# Patient Record
Sex: FEMALE | Race: WHITE | NOT HISPANIC OR LATINO | Employment: FULL TIME | URBAN - NONMETROPOLITAN AREA
[De-identification: names, ages, dates, MRNs, and addresses within clinical notes are randomized per-mention and may not be internally consistent; named-entity substitution may affect disease eponyms.]

---

## 2017-01-05 ENCOUNTER — OFFICE VISIT (OUTPATIENT)
Dept: INTERNAL MEDICINE | Facility: CLINIC | Age: 42
End: 2017-01-05

## 2017-01-05 VITALS
RESPIRATION RATE: 14 BRPM | TEMPERATURE: 98.2 F | HEART RATE: 90 BPM | SYSTOLIC BLOOD PRESSURE: 114 MMHG | OXYGEN SATURATION: 98 % | HEIGHT: 63 IN | BODY MASS INDEX: 24.66 KG/M2 | DIASTOLIC BLOOD PRESSURE: 72 MMHG | WEIGHT: 139.19 LBS

## 2017-01-05 DIAGNOSIS — J01.00 ACUTE NON-RECURRENT MAXILLARY SINUSITIS: Primary | ICD-10-CM

## 2017-01-05 PROCEDURE — 99213 OFFICE O/P EST LOW 20 MIN: CPT | Performed by: PHYSICIAN ASSISTANT

## 2017-01-05 RX ORDER — AMOXICILLIN 875 MG/1
875 TABLET, COATED ORAL 2 TIMES DAILY
Qty: 14 TABLET | Refills: 0 | Status: SHIPPED | OUTPATIENT
Start: 2017-01-05 | End: 2017-04-03

## 2017-01-05 NOTE — MR AVS SNAPSHOT
Umberto Oliva   1/5/2017 4:00 PM   Office Visit    Provider:  ARIELLE Wagner   Department:  [facility] PRIMARY CARE   Dept Phone:  [phone]                Your Full Care Plan              Today's Medication Changes          These changes are accurate as of: 1/5/17  3:57 PM.  If you have any questions, ask your nurse or doctor.               New Medication(s)Ordered:     amoxicillin 875 MG tablet   Commonly known as:  AMOXIL   Take 1 tablet by mouth 2 (Two) Times a Day.            Where to Get Your Medications      These medications were sent to [id number] - [place], [place] - [address] - [phone]  - [phone] FX  [address] [place] [address]     Phone:  [phone]     amoxicillin 875 MG tablet                  Your Updated Medication List          This list is accurate as of: 1/5/17  3:57 PM.  Always use your most recent med list.                amoxicillin 875 MG tablet   Commonly known as:  AMOXIL   Take 1 tablet by mouth 2 (Two) Times a Day.       estradiol 0.5 MG tablet   Commonly known as:  ESTRACE       ibuprofen 800 MG tablet   Commonly known as:  ADVIL,MOTRIN   Take 1 tablet by mouth Every 8 (Eight) Hours As Needed for mild pain (1-3) or headaches.       MULTIVITAL tablet       SUMAtriptan 100 MG tablet   Commonly known as:  IMITREX   Take 1 tablet by mouth Every 2 (Two) Hours As Needed for migraine.               You Were Diagnosed With        Codes Comments    Acute non-recurrent maxillary sinusitis    -  Primary ICD-10-CM: J01.00  ICD-9-CM: 461.0       Instructions     None    Patient Instructions History      MyChart Signup     Our records indicate that you have declined [facility] [employer] signup. If you would like to sign up for [employer], please email [email or web address] or call [phone] to obtain an activation code.             Other Info from Your Visit           Your Appointments     Velasquez  "05, 2017  4:00 PM EST   Same Day with ARIELLE Wagner   [facility] PRIMARY [facility]ARE (--)    [address]  [facility] [address] [phone]              Allergies     No Known Allergies      Reason for Visit     Sinus Problem Taking Sudafed and Mucinex. For over a week.       Vital Signs     Blood Pressure Pulse Temperature Respirations Height Weight    114/72 90 98.2 °F (36.8 °C) 14 63\" (160 cm) 139 lb 3 oz (63.1 kg)    Oxygen Saturation Body Mass Index Smoking Status             98% 24.66 kg/m2 Never Smoker         Problems and Diagnoses Noted     Acute non-recurrent maxillary sinusitis    -  Primary      "

## 2017-01-05 NOTE — PROGRESS NOTES
Umberto Oliva is a 41 y.o. female.     Subjective   History of Present Illness   Here today with complaint of over 1 week of postnasal drip, nasal congestion, sore throat, ear fullness, head pressure and cough. Has had some cold sweats but no fever. Taking Mucinex and Sudafed which helped the first 2 days but has not been helping this week.       The following portions of the patient's history were reviewed and updated as appropriate: allergies, current medications, past family history, past medical history, past social history, past surgical history and problem list.    Review of Systems    Constitutional: Negative for appetite change, chills, fatigue, fever and unexpected weight change.   HENT: congestion, ear pain, postnasal drip, rhinorrhea, sore throat.  Negative for hearing loss, nosebleeds, tinnitus and trouble swallowing.    Eyes: Negative for photophobia, discharge and visual disturbance.   Respiratory: Cough. Negative for chest tightness, shortness of breath and wheezing.    Cardiovascular: Negative for chest pain, palpitations and leg swelling.   Gastrointestinal: Negative for abdominal distention, abdominal pain, blood in stool, constipation, diarrhea, nausea and vomiting.   Endocrine: Negative for cold intolerance, heat intolerance, polydipsia, polyphagia and polyuria.   Musculoskeletal: Negative for arthralgias, back pain, joint swelling, myalgias, neck pain and neck stiffness.   Skin: Negative for color change, pallor, rash and wound.   Allergic/Immunologic: Negative for environmental allergies, food allergies and immunocompromised state.   Neurological: HA. Negative for dizziness, tremors, seizures, weakness, numbness.  Hematological: Negative for adenopathy. Does not bruise/bleed easily.   Psychiatric/Behavioral: Negative for agitation, behavioral problems, confusion, hallucinations, self-injury and suicidal ideas. The patient is not nervous/anxious.      Objective    Physical Exam  Constitutional:  "Oriented to person, place, and time. Appears well-developed and well-nourished.   HENT: white PND visible, bilateral TM effusions.   Head: Maxillary sinus tenderness. Normocephalic and atraumatic.   Eyes: EOM are normal. Pupils are equal, round, and reactive to light.   Neck: Normal range of motion. Neck supple.   Cardiovascular: Normal rate, regular rhythm and normal heart sounds.    Pulmonary/Chest: Effort normal and breath sounds normal. No respiratory distress.  Has no wheezes or rales. Exhibits no chest wall tenderness.   Abdominal: Soft. Bowel sounds are normal. Exhibits no distension and no mass. There is no tenderness.   Musculoskeletal: Normal range of motion. Exhibits no tenderness.   Neurological: Alert and oriented to person, place, and time.   Skin: Skin is warm and dry.   Psychiatric: Has a normal mood and affect. Behavior is normal. Judgment and thought content normal.       Visit Vitals   • /72   • Pulse 90   • Temp 98.2 °F (36.8 °C)   • Resp 14   • Ht 63\" (160 cm)   • Wt 139 lb 3 oz (63.1 kg)   • SpO2 98%   • BMI 24.66 kg/m2       Nursing note and vitals reviewed.        Assessment/Plan   Umberto was seen today for sinus problem.    Diagnoses and all orders for this visit:    Acute non-recurrent maxillary sinusitis  -     amoxicillin (AMOXIL) 875 MG tablet; Take 1 tablet by mouth 2 (Two) Times a Day.  Continue Mucinex and Sudafed prn.            "

## 2017-02-01 ENCOUNTER — OFFICE VISIT (OUTPATIENT)
Dept: INTERNAL MEDICINE | Facility: CLINIC | Age: 42
End: 2017-02-01

## 2017-02-01 VITALS
HEART RATE: 88 BPM | OXYGEN SATURATION: 97 % | DIASTOLIC BLOOD PRESSURE: 80 MMHG | HEIGHT: 63 IN | TEMPERATURE: 98.8 F | SYSTOLIC BLOOD PRESSURE: 110 MMHG | BODY MASS INDEX: 24.91 KG/M2 | WEIGHT: 140.6 LBS

## 2017-02-01 DIAGNOSIS — R07.89 CHEST DISCOMFORT: ICD-10-CM

## 2017-02-01 DIAGNOSIS — H53.9 VISION CHANGES: ICD-10-CM

## 2017-02-01 DIAGNOSIS — R42 DIZZINESS: Primary | ICD-10-CM

## 2017-02-01 LAB
BASOPHILS # BLD AUTO: 0.05 10*3/MM3 (ref 0–0.2)
BASOPHILS NFR BLD AUTO: 0.7 % (ref 0–1)
DEPRECATED RDW RBC AUTO: 41.4 FL (ref 37–54)
EOSINOPHIL # BLD AUTO: 0.05 10*3/MM3 (ref 0.1–0.3)
EOSINOPHIL NFR BLD AUTO: 0.7 % (ref 0–3)
ERYTHROCYTE [DISTWIDTH] IN BLOOD BY AUTOMATED COUNT: 12.3 % (ref 11.3–14.5)
HCT VFR BLD AUTO: 36.2 % (ref 34.5–44)
HGB BLD-MCNC: 12 G/DL (ref 11.5–15.5)
IMM GRANULOCYTES # BLD: 0.02 10*3/MM3 (ref 0–0.03)
IMM GRANULOCYTES NFR BLD: 0.3 % (ref 0–0.6)
LYMPHOCYTES # BLD AUTO: 3.37 10*3/MM3 (ref 0.6–4.8)
LYMPHOCYTES NFR BLD AUTO: 43.8 % (ref 24–44)
MCH RBC QN AUTO: 30.1 PG (ref 27–31)
MCHC RBC AUTO-ENTMCNC: 33.1 G/DL (ref 32–36)
MCV RBC AUTO: 90.7 FL (ref 80–99)
MONOCYTES # BLD AUTO: 0.55 10*3/MM3 (ref 0–1)
MONOCYTES NFR BLD AUTO: 7.2 % (ref 0–12)
NEUTROPHILS # BLD AUTO: 3.65 10*3/MM3 (ref 1.5–8.3)
NEUTROPHILS NFR BLD AUTO: 47.3 % (ref 41–71)
PLATELET # BLD AUTO: 248 10*3/MM3 (ref 150–450)
PMV BLD AUTO: 9.1 FL (ref 6–12)
RBC # BLD AUTO: 3.99 10*6/MM3 (ref 3.89–5.14)
WBC NRBC COR # BLD: 7.69 10*3/MM3 (ref 3.5–10.8)

## 2017-02-01 PROCEDURE — 85025 COMPLETE CBC W/AUTO DIFF WBC: CPT | Performed by: FAMILY MEDICINE

## 2017-02-01 PROCEDURE — 93000 ELECTROCARDIOGRAM COMPLETE: CPT | Performed by: FAMILY MEDICINE

## 2017-02-01 PROCEDURE — 99214 OFFICE O/P EST MOD 30 MIN: CPT | Performed by: FAMILY MEDICINE

## 2017-02-01 NOTE — PROGRESS NOTES
"Subjective   Umberto Oliva is a 41 y.o. female.     History of Present Illness   Comes in due to ongoing symptoms. She has had spells where she sees spots. She's having \"dizzy spells\" where she can describe it as light headed. No vertigo. At work earlier it was like a fog coming over eyes. She also has headaches, no certain spots. She feels weak, it hit her quickly, then breaks out in a sweat. Not a hot flash, she's had them before. Cold sweat. Felt a pressure over head and chest last night. She has Imitrex for migraines, she took one yesterday morning around 5 as she did not feel well. She also hears a \"buzzing sound in my head, jason.\" not like ears ringing, more like when she's been dehydrated. Headaches and weak spells started last week. No known family history of neurological disorders.    The following portions of the patient's history were reviewed and updated as appropriate: allergies, current medications, past family history, past medical history, past social history, past surgical history and problem list.    Review of Systems   Constitutional: Negative for unexpected weight change.   Respiratory: Negative for shortness of breath.    Cardiovascular: Positive for chest pain.   Gastrointestinal: Positive for nausea (Sunday).       Objective   Physical Exam   Constitutional: She is oriented to person, place, and time. Vital signs are normal. She appears well-developed and well-nourished. She is active.  Non-toxic appearance.   HENT:   Head: Normocephalic and atraumatic.   Right Ear: Hearing, tympanic membrane, external ear and ear canal normal.   Left Ear: Hearing, tympanic membrane, external ear and ear canal normal.   Nose: Nose normal.   Mouth/Throat: Uvula is midline, oropharynx is clear and moist and mucous membranes are normal.   Eyes: Conjunctivae, EOM and lids are normal. Pupils are equal, round, and reactive to light.   Wearing glasses     Neck: Phonation normal. Neck supple. No thyromegaly present. "   Cardiovascular: Normal rate, regular rhythm and normal heart sounds.    No murmur heard.  Pulmonary/Chest: Effort normal and breath sounds normal.   Lymphadenopathy:     She has no cervical adenopathy.   Neurological: She is alert and oriented to person, place, and time. She has normal strength. She displays no atrophy and no tremor. No cranial nerve deficit or sensory deficit. She exhibits normal muscle tone. Gait normal.   Skin: Skin is warm and dry. She is not diaphoretic. No cyanosis. Nails show no clubbing.   Psychiatric: She has a normal mood and affect. Her speech is normal and behavior is normal. Judgment and thought content normal. Cognition and memory are normal.   Nursing note and vitals reviewed.         Office Visit on 12/27/2016   Component Date Value Ref Range Status   • Total Cholesterol 12/27/2016 178  0 - 200 mg/dL Final   • Triglycerides 12/27/2016 78  0 - 150 mg/dL Final   • HDL Cholesterol 12/27/2016 63* 40 - 60 mg/dL Final   • LDL Cholesterol  12/27/2016 90  0 - 130 mg/dL Final   • Glucose 12/27/2016 84  70 - 100 mg/dL Final   • BUN 12/27/2016 13  9 - 23 mg/dL Final   • Creatinine 12/27/2016 0.70  0.60 - 1.30 mg/dL Final   • Sodium 12/27/2016 144  132 - 146 mmol/L Final   • Potassium 12/27/2016 4.0  3.5 - 5.5 mmol/L Final   • Chloride 12/27/2016 106  99 - 109 mmol/L Final   • CO2 12/27/2016 29.0  20.0 - 31.0 mmol/L Final   • Calcium 12/27/2016 10.0  8.7 - 10.4 mg/dL Final   • Total Protein 12/27/2016 7.3  5.7 - 8.2 g/dL Final   • Albumin 12/27/2016 4.40  3.20 - 4.80 g/dL Final   • ALT (SGPT) 12/27/2016 15  7 - 40 U/L Final   • AST (SGOT) 12/27/2016 26  0 - 33 U/L Final   • Alkaline Phosphatase 12/27/2016 82  25 - 100 U/L Final   • Total Bilirubin 12/27/2016 0.4  0.3 - 1.2 mg/dL Final   • eGFR Non African Amer 12/27/2016 93  >60 mL/min/1.73 Final   • Globulin 12/27/2016 2.9  gm/dL Final   • A/G Ratio 12/27/2016 1.5  1.5 - 2.5 g/dL Final   • BUN/Creatinine Ratio 12/27/2016 18.6  7.0 - 25.0  Final   • Anion Gap 12/27/2016 9.0  3.0 - 11.0 mmol/L Final   • TSH 12/27/2016 0.846  0.350 - 5.350 mIU/mL Final   • RA Latex Turbid 12/27/2016 <10.0  0.0 - 13.9 IU/mL Final   • RUDDY Direct 12/27/2016 Negative  Negative Final       ECG 12 Lead  Date/Time: 2/1/2017 4:27 PM  Performed by: DINO NEGRON  Authorized by: DINO NEGRON   Comparison: not compared with previous ECG   Rhythm: sinus rhythm and sinus tachycardia  Rate: normal  BPM: 68  Conduction: conduction normal  ST Segments: ST segments normal  T Waves: T waves normal  T depression: V1  QRS axis: normal  Other findings comments: poor conduction V3. p wave inversion V1  Clinical impression: normal ECG and non-specific ECG              Assessment/Plan   Umberto was seen today for dizziness, weakness - generalized and chest pain.    Diagnoses and all orders for this visit:    Dizziness  -     MRI Brain With & Without Contrast; Future  -     CBC & Differential; Future    Vision changes  -     MRI Brain With & Without Contrast; Future    Chest discomfort    Other orders  -     ECG 12 Lead      Ddx includes migraine, migraine variants but need to rule out vascular malformation, mass, white matter changes.

## 2017-02-13 ENCOUNTER — HOSPITAL ENCOUNTER (OUTPATIENT)
Dept: MRI IMAGING | Facility: HOSPITAL | Age: 42
Discharge: HOME | End: 2017-02-13
Admitting: FAMILY MEDICINE

## 2017-02-13 DIAGNOSIS — R42 DIZZINESS: ICD-10-CM

## 2017-02-13 DIAGNOSIS — H53.9 VISION CHANGES: ICD-10-CM

## 2017-02-13 PROCEDURE — 0 GADOBENATE DIMEGLUMINE 529 MG/ML SOLUTION: Performed by: FAMILY MEDICINE

## 2017-02-13 PROCEDURE — A9577 INJ MULTIHANCE: HCPCS | Performed by: FAMILY MEDICINE

## 2017-02-13 PROCEDURE — 70553 MRI BRAIN STEM W/O & W/DYE: CPT

## 2017-02-13 RX ADMIN — GADOBENATE DIMEGLUMINE 12 ML: 529 INJECTION, SOLUTION INTRAVENOUS at 11:45

## 2017-04-03 ENCOUNTER — OFFICE VISIT (OUTPATIENT)
Dept: INTERNAL MEDICINE | Facility: CLINIC | Age: 42
End: 2017-04-03

## 2017-04-03 VITALS
BODY MASS INDEX: 25.16 KG/M2 | DIASTOLIC BLOOD PRESSURE: 80 MMHG | SYSTOLIC BLOOD PRESSURE: 110 MMHG | WEIGHT: 142 LBS | HEIGHT: 63 IN | HEART RATE: 90 BPM | OXYGEN SATURATION: 99 % | TEMPERATURE: 98.7 F

## 2017-04-03 DIAGNOSIS — R05.8 NON-PRODUCTIVE COUGH: ICD-10-CM

## 2017-04-03 DIAGNOSIS — Z20.828 EXPOSURE TO THE FLU: ICD-10-CM

## 2017-04-03 DIAGNOSIS — J02.9 SORE THROAT: Primary | ICD-10-CM

## 2017-04-03 LAB
EXPIRATION DATE: NORMAL
EXPIRATION DATE: NORMAL
FLUAV AG NPH QL: NEGATIVE
FLUBV AG NPH QL: NEGATIVE
INTERNAL CONTROL: NORMAL
INTERNAL CONTROL: NORMAL
Lab: NORMAL
Lab: NORMAL
S PYO AG THROAT QL: NEGATIVE

## 2017-04-03 PROCEDURE — 87880 STREP A ASSAY W/OPTIC: CPT | Performed by: FAMILY MEDICINE

## 2017-04-03 PROCEDURE — 87804 INFLUENZA ASSAY W/OPTIC: CPT | Performed by: FAMILY MEDICINE

## 2017-04-03 PROCEDURE — 99213 OFFICE O/P EST LOW 20 MIN: CPT | Performed by: FAMILY MEDICINE

## 2017-04-03 RX ORDER — DEXTROMETHORPHAN HYDROBROMIDE AND PROMETHAZINE HYDROCHLORIDE 15; 6.25 MG/5ML; MG/5ML
5 SYRUP ORAL 4 TIMES DAILY PRN
Qty: 240 ML | Refills: 0 | Status: SHIPPED | OUTPATIENT
Start: 2017-04-03 | End: 2017-04-25

## 2017-04-03 NOTE — PROGRESS NOTES
Subjective   Umberto Oliva is a 42 y.o. female.     Sore Throat    This is a new problem. Episode onset: 4 days ago. Associated symptoms include congestion, coughing (non-productive), ear pain and headaches. Exposure to: influenza. Treatments tried: claritin-d, ibuprofen, tylenol, expectorant. The treatment provided mild relief.   Cough   The cough is non-productive. Associated symptoms include ear pain, a fever (feverish), headaches, postnasal drip and a sore throat.   Earache    Associated symptoms include coughing (non-productive), headaches and a sore throat.        The following portions of the patient's history were reviewed and updated as appropriate: allergies, current medications, past family history, past medical history, past social history, past surgical history and problem list.    Review of Systems   Constitutional: Positive for fever (feverish).   HENT: Positive for congestion, ear pain, postnasal drip and sore throat.    Respiratory: Positive for cough (non-productive).    Neurological: Positive for headaches.       Objective   Physical Exam   Constitutional: She is oriented to person, place, and time. Vital signs are normal. She appears well-developed and well-nourished. She does not appear ill.   HENT:   Head: Normocephalic and atraumatic.   Right Ear: Hearing, tympanic membrane, external ear and ear canal normal.   Left Ear: Hearing, tympanic membrane, external ear and ear canal normal.   Nose: Nose normal.   Mouth/Throat: Uvula is midline and mucous membranes are normal. Normal dentition. Posterior oropharyngeal erythema present. No oropharyngeal exudate.   Coated tongue  cobblestoning   Eyes: EOM and lids are normal. Pupils are equal, round, and reactive to light.   Neck: Neck supple.   Cardiovascular: Normal rate, regular rhythm and normal heart sounds.    Pulmonary/Chest: Effort normal and breath sounds normal. No stridor.   Lymphadenopathy:        Right cervical: No superficial cervical  adenopathy present.       Left cervical: No superficial cervical adenopathy present.   Neurological: She is alert and oriented to person, place, and time. No cranial nerve deficit.   Skin: Skin is warm. She is not diaphoretic.   Psychiatric: She has a normal mood and affect. Her speech is normal and behavior is normal.   Nursing note and vitals reviewed.       Office Visit on 04/03/2017   Component Date Value Ref Range Status   • Rapid Strep A Screen 04/03/2017 Negative  Negative, VALID, INVALID, Not Performed Final   • Internal Control 04/03/2017 Passed  Passed Final   • Lot Number 04/03/2017 [id number]   Final   • Expiration Date 04/03/2017 6/2018   Final   • Rapid Influenza A Ag 04/03/2017 NEGATIVE   Final   • Rapid Influenza B Ag 04/03/2017 NEGATIVE   Final   • Internal Control 04/03/2017 Passed  Passed Final   • Lot Number 04/03/2017 [id number]   Final   • Expiration Date 04/03/2017 10/30/2017   Final         Assessment/Plan   Umberto was seen today for sore throat, cough and earache.    Diagnoses and all orders for this visit:    Sore throat  -     POCT rapid strep A    Exposure to the flu  -     POCT Influenza A/B    Non-productive cough  -     promethazine-dextromethorphan (PROMETHAZINE-DM) 6.25-15 MG/5ML syrup; Take 5 mL by mouth 4 (Four) Times a Day As Needed for Cough.

## 2017-04-25 ENCOUNTER — OFFICE VISIT (OUTPATIENT)
Dept: INTERNAL MEDICINE | Facility: CLINIC | Age: 42
End: 2017-04-25

## 2017-04-25 ENCOUNTER — HOSPITAL ENCOUNTER (OUTPATIENT)
Dept: ULTRASOUND IMAGING | Facility: HOSPITAL | Age: 42
Discharge: HOME | End: 2017-04-25
Admitting: PHYSICIAN ASSISTANT

## 2017-04-25 VITALS
TEMPERATURE: 98.5 F | BODY MASS INDEX: 25.16 KG/M2 | WEIGHT: 142 LBS | HEART RATE: 94 BPM | SYSTOLIC BLOOD PRESSURE: 104 MMHG | OXYGEN SATURATION: 99 % | HEIGHT: 63 IN | DIASTOLIC BLOOD PRESSURE: 82 MMHG

## 2017-04-25 DIAGNOSIS — R60.0 EDEMA OF RIGHT LOWER EXTREMITY: Primary | ICD-10-CM

## 2017-04-25 DIAGNOSIS — Z79.890 HORMONE REPLACEMENT THERAPY: ICD-10-CM

## 2017-04-25 DIAGNOSIS — L03.115 CELLULITIS OF RIGHT ANKLE: ICD-10-CM

## 2017-04-25 DIAGNOSIS — M79.661 TENDERNESS OF RIGHT CALF: ICD-10-CM

## 2017-04-25 PROCEDURE — 93971 EXTREMITY STUDY: CPT

## 2017-04-25 PROCEDURE — 99214 OFFICE O/P EST MOD 30 MIN: CPT | Performed by: PHYSICIAN ASSISTANT

## 2017-04-25 RX ORDER — SULFAMETHOXAZOLE AND TRIMETHOPRIM 800; 160 MG/1; MG/1
1 TABLET ORAL 2 TIMES DAILY
Qty: 20 TABLET | Refills: 0 | Status: SHIPPED | OUTPATIENT
Start: 2017-04-25 | End: 2017-05-01

## 2017-04-25 NOTE — PROGRESS NOTES
Umberto Oliva is a 42 y.o. female.     Subjective   History of Present Illness   Here today with concern of right ankle pain, swelling and redness for the last few weeks. Right lateral ankle edematous and right medial ankle appears to have bites. Denies itching. Pain, swelling and redness worse with standing and improves some with elevation. Denies chest pain, SOA or palpitations. Symptoms worsening slightly since onset.         The following portions of the patient's history were reviewed and updated as appropriate: allergies, current medications, past family history, past medical history, past social history, past surgical history and problem list.    Review of Systems    Constitutional: Negative for appetite change, chills, fatigue, fever and unexpected weight change.   HENT: Negative for congestion, ear pain, hearing loss, nosebleeds, postnasal drip, rhinorrhea, sore throat, tinnitus and trouble swallowing.    Eyes: Negative for photophobia, discharge and visual disturbance.   Respiratory: Negative for cough, chest tightness, shortness of breath and wheezing.    Cardiovascular: Negative for chest pain, palpitations  Gastrointestinal: Negative for abdominal distention, abdominal pain, blood in stool, constipation, diarrhea, nausea and vomiting.   Endocrine: Negative for cold intolerance, heat intolerance, polydipsia, polyphagia and polyuria.   Musculoskeletal: right ankle pain and edema. Negative for arthralgias, back pain, joint swelling, myalgias, neck pain and neck stiffness.   Skin: right ankle swelling and edema, possible bites medially. Negative for pallor, rash.  Allergic/Immunologic: Negative for environmental allergies, food allergies and immunocompromised state.   Neurological: Negative for dizziness, tremors, seizures, weakness, numbness and headaches.   Hematological: Negative for adenopathy. Does not bruise/bleed easily.   Psychiatric/Behavioral: Negative for sleep disturbances, agitation, behavioral  "problems, confusion, hallucinations, self-injury and suicidal ideas. The patient is not nervous/anxious.      Objective    Physical Exam  Constitutional: Oriented to person, place, and time. Appears well-developed and well-nourished.   HENT:   Head: Normocephalic and atraumatic.   Eyes: EOM are normal. Pupils are equal, round, and reactive to light.   Neck: Normal range of motion. Neck supple.   Cardiovascular: Normal rate, regular rhythm and normal heart sounds.    Pulmonary/Chest: Effort normal and breath sounds normal. No respiratory distress.  Has no wheezes or rales. Exhibits no chest wall tenderness.   Abdominal: Soft. Bowel sounds are normal. Exhibits no distension and no mass. There is no tenderness.   Musculoskeletal: Positive Michelet sign right calf. Right ankle tenderness to palpation medially and laterally. Edema lateral ankle. Normal range of motion.   Neurological: Alert and oriented to person, place, and time.   Skin: erythema, warmth and edema right lateral ankle, erythema with spotted appearance similar to insect or spider bite marks of medial ankle. Skin is warm and dry.   Psychiatric: Has a normal mood and affect. Behavior is normal. Judgment and thought content normal.       /82  Pulse 94  Temp 98.5 °F (36.9 °C)  Ht 63\" (160 cm)  Wt 142 lb (64.4 kg)  SpO2 99%  BMI 25.15 kg/m2    Nursing note and vitals reviewed.        Assessment/Plan   Umberto was seen today for ankle pain.    Diagnoses and all orders for this visit:    Edema of right lower extremity  -     Duplex Venous Lower Extremity - Right CAR    Tenderness of right calf  -     Duplex Venous Lower Extremity - Right CAR    Hormone replacement therapy  -     Duplex Venous Lower Extremity - Right CAR    Cellulitis of right ankle  -     Duplex Venous Lower Extremity - Right CAR  -     sulfamethoxazole-trimethoprim (BACTRIM DS,SEPTRA DS) 800-160 MG per tablet; Take 1 tablet by mouth 2 (Two) Times a Day.    Rule out DVT. Only risk factor " is hormone replacement therapy with Estradiol.     Begin Bactrim due to cellulitis.     F/u in 1 week or sooner if not improving.

## 2017-04-26 ENCOUNTER — TELEPHONE (OUTPATIENT)
Dept: INTERNAL MEDICINE | Facility: CLINIC | Age: 42
End: 2017-04-26

## 2017-04-26 NOTE — TELEPHONE ENCOUNTER
That's not a typical reaction to it. I suggest continuing medicine unless she feels she has airway swelling, trouble breathing, or rash across body. Needs to go to ER if she feels she's having a reaction or schedule here (or see [facility]).

## 2017-04-26 NOTE — TELEPHONE ENCOUNTER
PATIENT SAW JULIO YESTERDAY FOR ANKLE PROBLEM AND SHE PUT HER ON BACTRIM. SHE WOKE UP THIS MORNING WITH NASAL CONGESTION AND RED, ITCHY EYES. SHE IS CONCERNED THAT SHE MAY BE HAVING A REACTION TO THE BACTRIM.

## 2017-05-01 ENCOUNTER — OFFICE VISIT (OUTPATIENT)
Dept: INTERNAL MEDICINE | Facility: CLINIC | Age: 42
End: 2017-05-01

## 2017-05-01 VITALS
TEMPERATURE: 98.3 F | BODY MASS INDEX: 25.16 KG/M2 | WEIGHT: 142 LBS | HEIGHT: 63 IN | OXYGEN SATURATION: 98 % | HEART RATE: 97 BPM

## 2017-05-01 DIAGNOSIS — L03.115 CELLULITIS OF RIGHT ANKLE: Primary | ICD-10-CM

## 2017-05-01 DIAGNOSIS — B34.9: ICD-10-CM

## 2017-05-01 PROCEDURE — 99213 OFFICE O/P EST LOW 20 MIN: CPT | Performed by: PHYSICIAN ASSISTANT

## 2017-05-01 RX ORDER — DOXYCYCLINE HYCLATE 100 MG/1
100 CAPSULE ORAL 2 TIMES DAILY
Qty: 14 CAPSULE | Refills: 0 | Status: SHIPPED | OUTPATIENT
Start: 2017-05-01 | End: 2017-05-08

## 2017-05-01 RX ORDER — VALACYCLOVIR HYDROCHLORIDE 1 G/1
1000 TABLET, FILM COATED ORAL 3 TIMES DAILY
Qty: 21 TABLET | Refills: 0 | Status: SHIPPED | OUTPATIENT
Start: 2017-05-01 | End: 2017-05-08

## 2017-05-04 ENCOUNTER — TELEPHONE (OUTPATIENT)
Dept: INTERNAL MEDICINE | Facility: CLINIC | Age: 42
End: 2017-05-04

## 2017-05-04 DIAGNOSIS — B34.9: ICD-10-CM

## 2017-05-04 DIAGNOSIS — M25.50 ARTHRALGIA, UNSPECIFIED JOINT: Primary | ICD-10-CM

## 2017-05-04 DIAGNOSIS — M79.9 LESION OF SOFT TISSUE OF LOWER LEG AND ANKLE: ICD-10-CM

## 2017-05-04 DIAGNOSIS — M25.471 ANKLE SWELLING, RIGHT: ICD-10-CM

## 2017-05-04 DIAGNOSIS — R50.9 FEVER, UNSPECIFIED FEVER CAUSE: ICD-10-CM

## 2017-05-04 DIAGNOSIS — M79.9 LESION OF SOFT TISSUE OF LOWER LEG AND ANKLE: Primary | ICD-10-CM

## 2017-05-05 LAB
B BURGDOR IGG+IGM SER-ACNC: <0.91 ISR (ref 0–0.9)
CRP SERPL-MCNC: 0.7 MG/DL (ref 0–1)

## 2017-05-09 ENCOUNTER — HOSPITAL ENCOUNTER (OUTPATIENT)
Dept: GENERAL RADIOLOGY | Facility: HOSPITAL | Age: 42
Discharge: HOME | End: 2017-05-09
Admitting: PHYSICIAN ASSISTANT

## 2017-05-09 DIAGNOSIS — M25.571 ACUTE RIGHT ANKLE PAIN: ICD-10-CM

## 2017-05-09 DIAGNOSIS — M25.473 ANKLE EDEMA: Primary | ICD-10-CM

## 2017-05-09 LAB
ALBUMIN SERPL-MCNC: 4.5 G/DL (ref 3.5–5)
ALBUMIN/GLOB SERPL: 1.4 G/DL (ref 1–2)
ALP SERPL-CCNC: 90 U/L (ref 38–126)
ALT SERPL-CCNC: 24 U/L (ref 13–69)
AST SERPL-CCNC: 31 U/L (ref 15–46)
BASOPHILS # BLD AUTO: 0.13 10*3/MM3 (ref 0–0.2)
BASOPHILS NFR BLD AUTO: 1.8 % (ref 0–2.5)
BILIRUB SERPL-MCNC: 0.4 MG/DL (ref 0.2–1.3)
BUN SERPL-MCNC: 12 MG/DL (ref 7–20)
BUN/CREAT SERPL: 17.1 (ref 7.1–23.5)
CALCIUM SERPL-MCNC: 10 MG/DL (ref 8.4–10.2)
CHLORIDE SERPL-SCNC: 107 MMOL/L (ref 98–107)
CO2 SERPL-SCNC: 23 MMOL/L (ref 26–30)
CREAT SERPL-MCNC: 0.7 MG/DL (ref 0.6–1.3)
DIFFERENTIAL COMMENT: NORMAL
EOSINOPHIL # BLD AUTO: 0.16 10*3/MM3 (ref 0–0.7)
EOSINOPHIL NFR BLD AUTO: 2.2 % (ref 0–7)
ERYTHROCYTE [DISTWIDTH] IN BLOOD BY AUTOMATED COUNT: 12 % (ref 11.5–14.5)
GLOBULIN SER CALC-MCNC: 3.3 GM/DL
GLUCOSE SERPL-MCNC: 85 MG/DL (ref 74–98)
HCT VFR BLD AUTO: 39.2 % (ref 37–47)
HGB BLD-MCNC: 12.9 G/DL (ref 12–16)
HSV1 IGG SER IA-ACNC: 26.1 INDEX (ref 0–0.9)
HSV2 IGG SER IA-ACNC: <0.91 INDEX (ref 0–0.9)
IMM GRANULOCYTES # BLD: 0.02 10*3/MM3 (ref 0–0.06)
IMM GRANULOCYTES NFR BLD: 0.3 % (ref 0–0.6)
LYMPHOCYTES # BLD AUTO: 3.63 10*3/MM3 (ref 0.6–3.4)
LYMPHOCYTES NFR BLD AUTO: 49.7 % (ref 10–50)
MCH RBC QN AUTO: 30 PG (ref 27–31)
MCHC RBC AUTO-ENTMCNC: 32.9 G/DL (ref 30–37)
MCV RBC AUTO: 91.2 FL (ref 81–99)
MONOCYTES # BLD AUTO: 0.46 10*3/MM3 (ref 0–0.9)
MONOCYTES NFR BLD AUTO: 6.3 % (ref 0–12)
NEUTROPHILS # BLD AUTO: 2.9 10*3/MM3 (ref 2–6.9)
NEUTROPHILS NFR BLD AUTO: 39.7 % (ref 37–80)
NRBC BLD AUTO-RTO: 0 /100 WBC (ref 0–0)
PLATELET # BLD AUTO: 305 10*3/MM3 (ref 130–400)
PLATELET BLD QL SMEAR: NORMAL
POTASSIUM SERPL-SCNC: 5.1 MMOL/L (ref 3.5–5.1)
PROT SERPL-MCNC: 7.8 G/DL (ref 6.3–8.2)
R RICKETTSI IGG SER QL IA: NEGATIVE
R RICKETTSI IGM TITR SER: 0.42 INDEX (ref 0–0.89)
RBC # BLD AUTO: 4.3 10*6/MM3 (ref 4.2–5.4)
RBC MORPH BLD: NORMAL
SODIUM SERPL-SCNC: 143 MMOL/L (ref 137–145)
WBC # BLD AUTO: 7.3 10*3/MM3 (ref 4.8–10.8)

## 2017-05-09 PROCEDURE — 73610 X-RAY EXAM OF ANKLE: CPT

## 2017-05-09 RX ORDER — POTASSIUM CHLORIDE 750 MG/1
10 TABLET, FILM COATED, EXTENDED RELEASE ORAL EVERY MORNING
Qty: 3 TABLET | Refills: 0 | Status: SHIPPED | OUTPATIENT
Start: 2017-05-09 | End: 2017-07-12

## 2017-05-09 RX ORDER — FUROSEMIDE 20 MG/1
20 TABLET ORAL EVERY MORNING
Qty: 3 TABLET | Refills: 0 | Status: SHIPPED | OUTPATIENT
Start: 2017-05-09 | End: 2017-07-12

## 2017-05-15 ENCOUNTER — TELEPHONE (OUTPATIENT)
Dept: INTERNAL MEDICINE | Facility: CLINIC | Age: 42
End: 2017-05-15

## 2017-05-15 DIAGNOSIS — M25.571 ACUTE RIGHT ANKLE PAIN: Primary | ICD-10-CM

## 2017-05-15 DIAGNOSIS — M25.473 ANKLE EDEMA: ICD-10-CM

## 2017-05-15 RX ORDER — METHYLPREDNISOLONE 4 MG/1
TABLET ORAL
Qty: 21 TABLET | Refills: 0 | Status: SHIPPED | OUTPATIENT
Start: 2017-05-15 | End: 2017-07-12

## 2017-05-25 RX ORDER — COLCHICINE 0.6 MG/1
0.6 TABLET ORAL DAILY
Qty: 7 TABLET | Refills: 0 | Status: SHIPPED | OUTPATIENT
Start: 2017-05-25 | End: 2017-06-01

## 2017-06-15 RX ORDER — ESTRADIOL 0.5 MG/1
TABLET ORAL
Qty: 30 TABLET | Refills: 1 | Status: SHIPPED | OUTPATIENT
Start: 2017-06-15 | End: 2017-07-12

## 2017-07-10 RX ORDER — HYDROCODONE BITARTRATE AND ACETAMINOPHEN 5; 325 MG/1; MG/1
1 TABLET ORAL EVERY 6 HOURS PRN
Qty: 20 TABLET | Refills: 0 | Status: SHIPPED | OUTPATIENT
Start: 2017-07-10 | End: 2017-08-09

## 2017-07-10 NOTE — TELEPHONE ENCOUNTER
Please route a prescription request for Norco 5/325 q6h prn for ankle pain to Dr. Carpenter. When approved please have her pick it up.

## 2017-07-10 NOTE — TELEPHONE ENCOUNTER
Patient called today stating that she is supposed to see ortho for her ankle in a few days, but she states that the pain is still really bad and it is still swollen. She would like to know if there is anything she can do before seeing ortho.

## 2017-07-12 ENCOUNTER — OFFICE VISIT (OUTPATIENT)
Dept: ORTHOPEDIC SURGERY | Facility: CLINIC | Age: 42
End: 2017-07-12

## 2017-07-12 ENCOUNTER — LAB (OUTPATIENT)
Dept: LAB | Facility: HOSPITAL | Age: 42
End: 2017-07-12

## 2017-07-12 VITALS
BODY MASS INDEX: 25.21 KG/M2 | DIASTOLIC BLOOD PRESSURE: 86 MMHG | HEIGHT: 62 IN | WEIGHT: 137 LBS | SYSTOLIC BLOOD PRESSURE: 143 MMHG | HEART RATE: 93 BPM

## 2017-07-12 DIAGNOSIS — G89.29 CHRONIC PAIN OF RIGHT ANKLE: ICD-10-CM

## 2017-07-12 DIAGNOSIS — M25.571 CHRONIC PAIN OF RIGHT ANKLE: ICD-10-CM

## 2017-07-12 DIAGNOSIS — M25.571 CHRONIC PAIN OF RIGHT ANKLE: Primary | ICD-10-CM

## 2017-07-12 DIAGNOSIS — G89.29 CHRONIC PAIN OF RIGHT ANKLE: Primary | ICD-10-CM

## 2017-07-12 LAB
BASOPHILS # BLD AUTO: 0.06 10*3/MM3 (ref 0–0.2)
BASOPHILS NFR BLD AUTO: 1 % (ref 0–1)
CRP SERPL-MCNC: 0.12 MG/DL (ref 0–1)
DEPRECATED RDW RBC AUTO: 43.4 FL (ref 37–54)
EOSINOPHIL # BLD AUTO: 0.05 10*3/MM3 (ref 0–0.3)
EOSINOPHIL NFR BLD AUTO: 0.9 % (ref 0–3)
ERYTHROCYTE [DISTWIDTH] IN BLOOD BY AUTOMATED COUNT: 12.7 % (ref 11.3–14.5)
ERYTHROCYTE [SEDIMENTATION RATE] IN BLOOD: 11 MM/HR (ref 0–20)
HCT VFR BLD AUTO: 40.6 % (ref 34.5–44)
HGB BLD-MCNC: 12.8 G/DL (ref 11.5–15.5)
IMM GRANULOCYTES # BLD: 0 10*3/MM3 (ref 0–0.03)
IMM GRANULOCYTES NFR BLD: 0 % (ref 0–0.6)
LYMPHOCYTES # BLD AUTO: 2.66 10*3/MM3 (ref 0.6–4.8)
LYMPHOCYTES NFR BLD AUTO: 45.6 % (ref 24–44)
MCH RBC QN AUTO: 29.5 PG (ref 27–31)
MCHC RBC AUTO-ENTMCNC: 31.5 G/DL (ref 32–36)
MCV RBC AUTO: 93.5 FL (ref 80–99)
MONOCYTES # BLD AUTO: 0.42 10*3/MM3 (ref 0–1)
MONOCYTES NFR BLD AUTO: 7.2 % (ref 0–12)
NEUTROPHILS # BLD AUTO: 2.64 10*3/MM3 (ref 1.5–8.3)
NEUTROPHILS NFR BLD AUTO: 45.3 % (ref 41–71)
PLATELET # BLD AUTO: 270 10*3/MM3 (ref 150–450)
PMV BLD AUTO: 9.2 FL (ref 6–12)
RBC # BLD AUTO: 4.34 10*6/MM3 (ref 3.89–5.14)
URATE SERPL-MCNC: 5.7 MG/DL (ref 3.1–7.8)
WBC NRBC COR # BLD: 5.83 10*3/MM3 (ref 3.5–10.8)

## 2017-07-12 PROCEDURE — 86140 C-REACTIVE PROTEIN: CPT | Performed by: ORTHOPAEDIC SURGERY

## 2017-07-12 PROCEDURE — 86038 ANTINUCLEAR ANTIBODIES: CPT | Performed by: ORTHOPAEDIC SURGERY

## 2017-07-12 PROCEDURE — 86431 RHEUMATOID FACTOR QUANT: CPT | Performed by: ORTHOPAEDIC SURGERY

## 2017-07-12 PROCEDURE — 84550 ASSAY OF BLOOD/URIC ACID: CPT | Performed by: ORTHOPAEDIC SURGERY

## 2017-07-12 PROCEDURE — 85025 COMPLETE CBC W/AUTO DIFF WBC: CPT | Performed by: ORTHOPAEDIC SURGERY

## 2017-07-12 PROCEDURE — 85652 RBC SED RATE AUTOMATED: CPT | Performed by: ORTHOPAEDIC SURGERY

## 2017-07-12 PROCEDURE — 36415 COLL VENOUS BLD VENIPUNCTURE: CPT

## 2017-07-12 PROCEDURE — 99204 OFFICE O/P NEW MOD 45 MIN: CPT | Performed by: ORTHOPAEDIC SURGERY

## 2017-07-12 NOTE — PROGRESS NOTES
NEW PATIENT    Patient: Umberto Oliva  : 1975    Primary Care Provider: Nicolasa Carpenter MD    Requesting Provider: As above    Pain of the Right Ankle      History    Chief Complaint: Right ankle pain and swelling    History of Present Illness: This is a very pleasant 42-year-old woman with a 3 to four-month history of right ankle pain and swelling that has been getting progressively worse over time.  She has had nonweightbearing x-rays that were unremarkable.  She has tried ice and topical medication.  She has tried anti-inflammatory medicine without improvement.  She has had 2 rounds of antibiotics without improvement.  She was given diuretic without improvement.  She notes that over time the skin on the medial malleolus has darkened, and she has about a 2 cm almost circular area of dark pigmentation over the medial malleolus.  She does not remember having any insect bite.  She did not have any injury.  This started spontaneously.  She has not had any fevers or chills.  She does not have any history of gout nor inflammatory arthritis.  She does not have any family history of these.  She rates the pain as an 8 out of 10, sharp and aching, its often very tender to light touch.  She works as a  at it's difficult to work.  She was given what sounds like a Medrol Dosepak and that did help while she was taking it, and then the pain and swelling recurred.    Current Outpatient Prescriptions on File Prior to Visit   Medication Sig Dispense Refill   • estradiol (ESTRACE) 0.5 MG tablet Take 1 tablet by mouth Daily.     • HYDROcodone-acetaminophen (NORCO) 5-325 MG per tablet Take 1 tablet by mouth Every 6 (Six) Hours As Needed for Moderate Pain (4-6) (FOR ANKLE PAIN). 20 tablet 0   • ibuprofen (ADVIL,MOTRIN) 800 MG tablet Take 1 tablet by mouth Every 8 (Eight) Hours As Needed for mild pain (1-3) or headaches. 60 tablet 1   • SUMAtriptan (IMITREX) 100 MG tablet Take 1 tablet by mouth Every 2 (Two)  Hours As Needed for migraine. 9 tablet 5   • [DISCONTINUED] estradiol (ESTRACE) 0.5 MG tablet TAKE 1 TABLET BY MOUTH ONE TIME A DAY  30 tablet 1   • [DISCONTINUED] furosemide (LASIX) 20 MG tablet Take 1 tablet by mouth Every Morning. 3 tablet 0   • [DISCONTINUED] Loratadine-Pseudoephedrine (CLARITIN-D 12 HOUR PO) Take  by mouth.     • [DISCONTINUED] MethylPREDNISolone (MEDROL, PEREZ,) 4 MG tablet Take as directed on package instructions. 21 tablet 0   • [DISCONTINUED] Multiple Vitamins-Minerals (MULTIVITAL) tablet Take 1 tablet by mouth daily.     • [DISCONTINUED] potassium chloride (K-DUR) 10 MEQ CR tablet Take 1 tablet by mouth Every Morning. 3 tablet 0     No current facility-administered medications on file prior to visit.       No Known Allergies   Past Medical History:   Diagnosis Date   • Endometriosis    • Headache    • Kidney stone    • Ovarian cyst      Past Surgical History:   Procedure Laterality Date   • COLON SURGERY      BOWEL BLOCKAGE  AGE 15   • DIAGNOSTIC LAPAROSCOPY      TIMES 4   • EXPLORATORY LAPAROTOMY  2006   • HYSTERECTOMY  07/24/2012    [facility] BS&[facility]     Family History   Problem Relation Age of Onset   • Breast cancer Other    • Osteoarthritis Mother    • Hypertension Father    • Diabetes Other    • Heart attack Other    • Hypertension Other    • Stroke Other       Social History     Social History   • Marital status:      Spouse name: N/A   • Number of children: N/A   • Years of education: N/A     Occupational History   • Not on file.     Social History Main Topics   • Smoking status: Never Smoker   • Smokeless tobacco: Never Used   • Alcohol use No   • Drug use: No   • Sexual activity: Yes     Partners: Male     Birth control/ protection: Surgical     Other Topics Concern   • Not on file     Social History Narrative        Review of Systems   Constitutional: Positive for activity change and fatigue.   Eyes: Negative.    Respiratory: Negative.    Cardiovascular: Positive for leg swelling.  "  Gastrointestinal: Negative.    Endocrine: Negative.    Genitourinary: Negative.    Musculoskeletal: Positive for arthralgias and joint swelling.   Skin: Negative.    Allergic/Immunologic: Negative.    Neurological: Positive for headaches.   Hematological: Negative.    Psychiatric/Behavioral: Negative.        The following portions of the patient's history were reviewed and updated as appropriate: allergies, current medications, past family history, past medical history, past social history, past surgical history and problem list.    Physical Exam:   /86  Pulse 93  Ht 62\" (157.5 cm)  Wt 137 lb (62.1 kg)  BMI 25.06 kg/m2  GENERAL: Body habitus: normal weight for height    Lower extremity edema: Left: none; Right: none and The right ankle is swollen, but there is no generalized edema    Varicose veins:  Left: none; Right: none    Gait: antalgic     Mental Status:  awake and alert; oriented to person, place, and time    Voice:  clear  SKIN:  Normal and warm and dry over the right medial malleolus is a flat 2 cm diameter almost circular area of dark pigmentation, it does not really look like erythema nodosum, it does not katja, it does not appear to be ecchymosis    Hair Growth:  Right:normal; Left:  normal  NAILS: Toenails: normal and Fingernails are also normal except one on the right hand which had a previous injury.  No signs of psoriasis.  HEENT: Head: Normocephalic, atraumatic,  without obvious abnormality.  eye: normal external eye, no icterus  ears: normal external ears  nose: normal external nose  PULM:  Repiratory effort normal  CV:  Dorsalis Pedis:  Right: 2+; Left:2+    Posterior Tibial: Right:2+; Left:2+    Capillary Refill:  Brisk  MSK:  Hand:right handed and sensation intact no synovitis in either hand, no synovitis in the wrists or elbows or shoulders      Tibia:  Right:  non tender; Left:  non tender      Ankle:  Right: Right ankle is exquisitely tender circumferentially, she has a moderate " effusion and synovitis, she has the discoloration over the medial malleolus, range of motion shows she lacks a few degrees in all directions compared to the left side.  Motor is all normal.; Left:  non tender, ROM  normal and motor function  normal      Foot:  Right:  non tender; Left:  non tender      NEURO: Heel Walking:  Right:  painful; Left:  normal    Toe Walking:  Right:  limited ability, painful; Left:  normal     Angora-Amaury 5.07 monofilament test: normal    Lower extremity sensation: intact     Reflexes:  Biceps:  Right:  1+; Left:  1+           Quads:  Right:  2+; Left:  2+           Ankle:  Right:  1+; Left:  1+      Calf Atrophy:none    Motor Function: all 5/5 with some pain to testing the ankle borders on the right         Medical Decision Making    Data Review:   ordered and reviewed x-rays today, reviewed prior lab results  I reviewed the lab studies from May), reviewed radiology images  I reviewed the nonweightbearing films, reviewed radiology results  (I reviewed the reports for the x-rays) and reviewed outside records  (I reviewed her office notes)    Assessment and Plan/ Diagnosis/Treatment options:   Unusual persistent right ankle synovitis swelling and pain.  This looks most like some type of inflammatory process.  She does not have any history of any prior infections, no history of something like chlamydia nor urinary tract infection.  Nothing that sounds like Angella's syndrome.  She does not have any family history of inflammatory arthritis.  I think gout would be fairly low on the differential list.  I think it's worthwhile repeating some more labs looking at inflammatory markers.  Especially since she did get better with a short course of prednisone.  I also think an MRI is important.  We need to see how much synovitis she has, is there any intra-articular problem causing this.  I will see her back following the MRI.

## 2017-07-13 LAB
ANA SER QL: NEGATIVE
RHEUMATOID FACT SERPL-ACNC: NEGATIVE [IU]/ML

## 2017-07-17 ENCOUNTER — HOSPITAL ENCOUNTER (OUTPATIENT)
Dept: MRI IMAGING | Facility: HOSPITAL | Age: 42
Discharge: HOME | End: 2017-07-17
Attending: ORTHOPAEDIC SURGERY | Admitting: ORTHOPAEDIC SURGERY

## 2017-07-17 DIAGNOSIS — G89.29 CHRONIC PAIN OF RIGHT ANKLE: ICD-10-CM

## 2017-07-17 DIAGNOSIS — M25.571 CHRONIC PAIN OF RIGHT ANKLE: ICD-10-CM

## 2017-07-17 PROCEDURE — 73721 MRI JNT OF LWR EXTRE W/O DYE: CPT

## 2017-08-09 ENCOUNTER — OFFICE VISIT (OUTPATIENT)
Dept: ORTHOPEDIC SURGERY | Facility: CLINIC | Age: 42
End: 2017-08-09

## 2017-08-09 DIAGNOSIS — M25.571 CHRONIC PAIN OF RIGHT ANKLE: Primary | ICD-10-CM

## 2017-08-09 DIAGNOSIS — M65.9 SYNOVITIS OF RIGHT ANKLE: ICD-10-CM

## 2017-08-09 DIAGNOSIS — G89.29 CHRONIC PAIN OF RIGHT ANKLE: Primary | ICD-10-CM

## 2017-08-09 PROBLEM — M65.971 SYNOVITIS OF RIGHT ANKLE: Status: ACTIVE | Noted: 2017-08-09

## 2017-08-09 PROCEDURE — 99213 OFFICE O/P EST LOW 20 MIN: CPT | Performed by: ORTHOPAEDIC SURGERY

## 2017-08-09 RX ORDER — MELOXICAM 15 MG/1
TABLET ORAL
Refills: 3 | COMMUNITY
Start: 2017-07-30 | End: 2017-12-19

## 2017-08-09 NOTE — PROGRESS NOTES
ESTABLISHED PATIENT    Patient: mUberto Oliva  : 1975    Primary Care Provider: Nicolasa Carpenter MD    Requesting Provider: As above    Follow-up of the Right Ankle (After MRI 2017)      History    Chief Complaint:  Persistent right ankle swelling and pain    History of Present Illness:  Her MRI does show fluid around all the ankle tendons, but no tears of the tendons, no loose bodies, no OCD lesions. no mechanical source for the problem. Her lab studies were all normal.    Current Outpatient Prescriptions on File Prior to Visit   Medication Sig Dispense Refill   • estradiol (ESTRACE) 0.5 MG tablet Take 1 tablet by mouth Daily.     • ibuprofen (ADVIL,MOTRIN) 800 MG tablet Take 1 tablet by mouth Every 8 (Eight) Hours As Needed for mild pain (1-3) or headaches. 60 tablet 1   • SUMAtriptan (IMITREX) 100 MG tablet Take 1 tablet by mouth Every 2 (Two) Hours As Needed for migraine. 9 tablet 5   • [DISCONTINUED] HYDROcodone-acetaminophen (NORCO) 5-325 MG per tablet Take 1 tablet by mouth Every 6 (Six) Hours As Needed for Moderate Pain (4-6) (FOR ANKLE PAIN). 20 tablet 0     No current facility-administered medications on file prior to visit.       No Known Allergies   Past Medical History:   Diagnosis Date   • Endometriosis    • Headache    • Kidney stone    • Ovarian cyst      Past Surgical History:   Procedure Laterality Date   • COLON SURGERY      BOWEL BLOCKAGE  AGE 15   • DIAGNOSTIC LAPAROSCOPY      TIMES 4   • EXPLORATORY LAPAROTOMY     • HYSTERECTOMY  2012    [facility] BS&[facility]     Family History   Problem Relation Age of Onset   • Breast cancer Other    • Osteoarthritis Mother    • Hypertension Father    • Diabetes Other    • Heart attack Other    • Hypertension Other    • Stroke Other       Social History     Social History   • Marital status:      Spouse name: N/A   • Number of children: N/A   • Years of education: N/A     Occupational History   • Not on file.     Social History Main  Topics   • Smoking status: Never Smoker   • Smokeless tobacco: Never Used   • Alcohol use No   • Drug use: No   • Sexual activity: Yes     Partners: Male     Birth control/ protection: Surgical     Other Topics Concern   • Not on file     Social History Narrative        Review of Systems    The following portions of the patient's history were reviewed and updated as appropriate: allergies, current medications, past family history, past medical history, past social history, past surgical history and problem list.    Physical Exam:   There were no vitals taken for this visit.  GENERAL: Gait: normal       MSK:  Ankle:  Right: tender circumferentially, moderate swelling around ankle, no change in medial skin discolorateion; Left:  non tender        Foot:  Right:  non tender; Left:  non tender    NEURO Sensation:  intact     Medical Decision Making    Data Review:   reviewed radiology images    and reviewed radiology results       Assessment/Plan/Diagnosis/Treatment Options:   I looked at the MRI and report, I do not find a mechanical source for her symptoms.  I think this is some type of inflammatory arthritis, likely in  the sero-negative group.  We will make her an appointment with rheumatology.  There is no surgical nor mechanical treatment that I can think of to help the problem.

## 2017-10-13 ENCOUNTER — OFFICE VISIT (OUTPATIENT)
Dept: INTERNAL MEDICINE | Facility: CLINIC | Age: 42
End: 2017-10-13

## 2017-10-13 VITALS
SYSTOLIC BLOOD PRESSURE: 122 MMHG | TEMPERATURE: 98.1 F | DIASTOLIC BLOOD PRESSURE: 78 MMHG | WEIGHT: 141 LBS | HEIGHT: 62 IN | BODY MASS INDEX: 25.95 KG/M2 | HEART RATE: 90 BPM | OXYGEN SATURATION: 100 %

## 2017-10-13 DIAGNOSIS — R52 BODY ACHES: Primary | ICD-10-CM

## 2017-10-13 LAB
EXPIRATION DATE: NORMAL
EXPIRATION DATE: NORMAL
FLUAV AG NPH QL: NEGATIVE
FLUBV AG NPH QL: NEGATIVE
HETEROPH AB SER QL LA: NEGATIVE
INTERNAL CONTROL: NORMAL
INTERNAL CONTROL: NORMAL
Lab: NORMAL
Lab: NORMAL

## 2017-10-13 PROCEDURE — 99213 OFFICE O/P EST LOW 20 MIN: CPT | Performed by: PHYSICIAN ASSISTANT

## 2017-10-13 PROCEDURE — 86308 HETEROPHILE ANTIBODY SCREEN: CPT | Performed by: PHYSICIAN ASSISTANT

## 2017-10-13 PROCEDURE — 87804 INFLUENZA ASSAY W/OPTIC: CPT | Performed by: PHYSICIAN ASSISTANT

## 2017-10-13 NOTE — PROGRESS NOTES
Umberto Oliva is a 42 y.o. female.     Subjective   History of Present Illness   Bilateral legs and feet began aching 3 nights ago and which was so bothersome that it kept her awake all night. Yesterday she began having intense aches and weakness all over her body. This morning when she woke up she had a bad headache. Later this afternoon she began sweating and feeling weak again. Denies CP, SOA, confusion, rash, fever, sore throat or GI symptoms. Her daughter was sick 1 week ago with some similar symptoms but also had sore throat and low-grade fever.  Ibuprofen eases the aches minimally.       The following portions of the patient's history were reviewed and updated as appropriate: allergies, current medications, past family history, past medical history, past social history, past surgical history and problem list.    Review of Systems    Constitutional: fatigue, sweats. Negative for appetite change, chills, fever and unexpected weight change.   HENT: Negative for congestion, ear pain, hearing loss, nosebleeds, postnasal drip, rhinorrhea, sore throat, tinnitus and trouble swallowing.    Eyes: Negative for photophobia, discharge and visual disturbance.   Respiratory: Negative for cough, chest tightness, shortness of breath and wheezing.    Cardiovascular: Negative for chest pain, palpitations and leg swelling.   Gastrointestinal: Negative for abdominal distention, abdominal pain, blood in stool, constipation, diarrhea, nausea and vomiting.   Endocrine: Negative for cold intolerance, heat intolerance, polydipsia, polyphagia and polyuria.   Musculoskeletal: body aches, myalgias. Negative for back pain, joint swelling, neck pain and neck stiffness.   Skin: Negative for color change, pallor, rash and wound.   Allergic/Immunologic: Negative for environmental allergies, food allergies and immunocompromised state.   Neurological: headache, weakness. Negative for dizziness, tremors, seizures, numbness.   Hematological:  "Negative for adenopathy. Does not bruise/bleed easily.   Psychiatric/Behavioral: Negative for sleep disturbances, agitation, behavioral problems, confusion, hallucinations, self-injury and suicidal ideas. The patient is not nervous/anxious.      Objective    Physical Exam  Constitutional: Oriented to person, place, and time. Appears well-developed and well-nourished.   HENT: OP erythema, TMs normal.   Head: No sinus tenderness. Normocephalic and atraumatic.   Eyes: EOM are normal. Pupils are equal, round, and reactive to light.   Neck: Normal range of motion. Neck supple.   Cardiovascular: Normal rate, regular rhythm and normal heart sounds.    Pulmonary/Chest: Effort normal and breath sounds normal. No respiratory distress.  Has no wheezes or rales. Exhibits no chest wall tenderness.   Abdominal: Soft. Bowel sounds are normal. Exhibits no distension and no mass. There is no tenderness.   Musculoskeletal: Normal range of motion. Exhibits no tenderness.   Neurological: CNII-XII intact. Alert and oriented to person, place, and time.   Skin: Skin is warm and dry.   Psychiatric: Has a normal mood and affect. Behavior is normal. Judgment and thought content normal.       /78  Pulse 90  Temp 98.1 °F (36.7 °C)  Ht 62\" (157.5 cm)  Wt 141 lb (64 kg)  SpO2 100%  BMI 25.79 kg/m2    Nursing note and vitals reviewed.          Assessment/Plan   Umberto was seen today for generalized body aches and headache.    Diagnoses and all orders for this visit:    Body aches  -     POCT Influenza A/B  -     POCT Infectious mononucleosis antibody  Flu- negative  Mono-negative      Suspect viral illness with unknown etiology.  Encouraged rest, fluids, Tylenol and ibuprofen prn.      Call or RTC Monday if symptoms worsen or persist.      "

## 2017-10-16 ENCOUNTER — TELEPHONE (OUTPATIENT)
Dept: INTERNAL MEDICINE | Facility: CLINIC | Age: 42
End: 2017-10-16

## 2017-10-16 ENCOUNTER — CLINICAL SUPPORT (OUTPATIENT)
Dept: INTERNAL MEDICINE | Facility: CLINIC | Age: 42
End: 2017-10-16

## 2017-10-16 DIAGNOSIS — R52 BODY ACHES: ICD-10-CM

## 2017-10-16 DIAGNOSIS — J02.9 PHARYNGITIS, UNSPECIFIED ETIOLOGY: Primary | ICD-10-CM

## 2017-10-16 DIAGNOSIS — J02.9 SORE THROAT: Primary | ICD-10-CM

## 2017-10-16 LAB
EXPIRATION DATE: NORMAL
INTERNAL CONTROL: NORMAL
Lab: NORMAL
S PYO AG THROAT QL: NEGATIVE

## 2017-10-16 PROCEDURE — 87880 STREP A ASSAY W/OPTIC: CPT | Performed by: PHYSICIAN ASSISTANT

## 2017-10-16 NOTE — TELEPHONE ENCOUNTER
PATIENT IS CALLING BACK TO LET JULIO KNOW SHE'S STILL NOT FEELING ANY BETTER. SHE SAID SHE FEELS VERY ACHY, AND HAS A BAD SORE THROAT. WOULD LIKE TO KNOW WHAT JULIO RECOMMENDS.

## 2017-10-17 LAB
ALBUMIN SERPL-MCNC: 4.2 G/DL (ref 3.5–5)
ALBUMIN/GLOB SERPL: 1.4 G/DL (ref 1–2)
ALP SERPL-CCNC: 97 U/L (ref 38–126)
ALT SERPL-CCNC: 26 U/L (ref 13–69)
AST SERPL-CCNC: 24 U/L (ref 15–46)
BASOPHILS # BLD AUTO: 0.06 10*3/MM3 (ref 0–0.2)
BASOPHILS NFR BLD AUTO: 0.9 % (ref 0–2.5)
BILIRUB SERPL-MCNC: 0.4 MG/DL (ref 0.2–1.3)
BUN SERPL-MCNC: 12 MG/DL (ref 7–20)
BUN/CREAT SERPL: 17.1 (ref 7.1–23.5)
CALCIUM SERPL-MCNC: 9.6 MG/DL (ref 8.4–10.2)
CHLORIDE SERPL-SCNC: 104 MMOL/L (ref 98–107)
CO2 SERPL-SCNC: 26 MMOL/L (ref 26–30)
CREAT SERPL-MCNC: 0.7 MG/DL (ref 0.6–1.3)
EBV EA IGG SER-ACNC: <9 U/ML (ref 0–8.9)
EBV NA IGG SER IA-ACNC: 438 U/ML (ref 0–17.9)
EBV VCA IGG SER IA-ACNC: <18 U/ML (ref 0–17.9)
EBV VCA IGM SER IA-ACNC: <36 U/ML (ref 0–35.9)
EOSINOPHIL # BLD AUTO: 0.06 10*3/MM3 (ref 0–0.7)
EOSINOPHIL NFR BLD AUTO: 0.9 % (ref 0–7)
ERYTHROCYTE [DISTWIDTH] IN BLOOD BY AUTOMATED COUNT: 11.9 % (ref 11.5–14.5)
GLOBULIN SER CALC-MCNC: 3.1 GM/DL
GLUCOSE SERPL-MCNC: 162 MG/DL (ref 74–98)
HCT VFR BLD AUTO: 38.1 % (ref 37–47)
HGB BLD-MCNC: 12.3 G/DL (ref 12–16)
IMM GRANULOCYTES # BLD: 0.03 10*3/MM3 (ref 0–0.06)
IMM GRANULOCYTES NFR BLD: 0.4 % (ref 0–0.6)
LYMPHOCYTES # BLD AUTO: 2.81 10*3/MM3 (ref 0.6–3.4)
LYMPHOCYTES NFR BLD AUTO: 41.9 % (ref 10–50)
MCH RBC QN AUTO: 29.1 PG (ref 27–31)
MCHC RBC AUTO-ENTMCNC: 32.3 G/DL (ref 30–37)
MCV RBC AUTO: 90.3 FL (ref 81–99)
MONOCYTES # BLD AUTO: 0.36 10*3/MM3 (ref 0–0.9)
MONOCYTES NFR BLD AUTO: 5.4 % (ref 0–12)
NEUTROPHILS # BLD AUTO: 3.39 10*3/MM3 (ref 2–6.9)
NEUTROPHILS NFR BLD AUTO: 50.5 % (ref 37–80)
NRBC BLD AUTO-RTO: 0 /100 WBC (ref 0–0)
PLATELET # BLD AUTO: 313 10*3/MM3 (ref 130–400)
POTASSIUM SERPL-SCNC: 4.2 MMOL/L (ref 3.5–5.1)
PROT SERPL-MCNC: 7.3 G/DL (ref 6.3–8.2)
RBC # BLD AUTO: 4.22 10*6/MM3 (ref 4.2–5.4)
SERVICE CMNT-IMP: ABNORMAL
SODIUM SERPL-SCNC: 144 MMOL/L (ref 137–145)
WBC # BLD AUTO: 6.71 10*3/MM3 (ref 4.8–10.8)

## 2017-10-18 RX ORDER — AMOXICILLIN 875 MG/1
875 TABLET, COATED ORAL 2 TIMES DAILY
Qty: 20 TABLET | Refills: 0 | Status: SHIPPED | OUTPATIENT
Start: 2017-10-18 | End: 2017-12-19

## 2017-10-19 ENCOUNTER — TELEPHONE (OUTPATIENT)
Dept: INTERNAL MEDICINE | Facility: CLINIC | Age: 42
End: 2017-10-19

## 2017-10-19 RX ORDER — METHYLPREDNISOLONE 4 MG/1
TABLET ORAL
Qty: 1 EACH | Refills: 0 | Status: SHIPPED | OUTPATIENT
Start: 2017-10-19 | End: 2017-12-19

## 2017-10-19 NOTE — TELEPHONE ENCOUNTER
I did not check Vitamin D, we were only looking into her illness with those labs. Antibiotic should begin to help after 24-48 hours.

## 2017-10-19 NOTE — TELEPHONE ENCOUNTER
PLEASE RETURN CALL. PATIENT WOULD LIKE TO KNOW IF JULIO CHECKED HER VITAMIN D, AND HOW LONG WILL THE ANTIBIOTIC TAKE TO START WORKING? SHE SAID SHE'S STILL ACHING.

## 2017-10-31 DIAGNOSIS — Z20.828 EXPOSURE TO INFLUENZA: Primary | ICD-10-CM

## 2017-10-31 RX ORDER — OSELTAMIVIR PHOSPHATE 75 MG/1
75 CAPSULE ORAL DAILY
Qty: 10 CAPSULE | Refills: 0 | Status: SHIPPED | OUTPATIENT
Start: 2017-10-31 | End: 2017-12-19

## 2017-12-15 ENCOUNTER — HOSPITAL ENCOUNTER (EMERGENCY)
Facility: HOSPITAL | Age: 42
Discharge: HOME | End: 2017-12-15
Attending: EMERGENCY MEDICINE | Admitting: EMERGENCY MEDICINE

## 2017-12-15 ENCOUNTER — APPOINTMENT (OUTPATIENT)
Dept: GENERAL RADIOLOGY | Facility: HOSPITAL | Age: 42
End: 2017-12-15

## 2017-12-15 VITALS
RESPIRATION RATE: 18 BRPM | OXYGEN SATURATION: 98 % | TEMPERATURE: 97.7 F | WEIGHT: 140 LBS | HEIGHT: 63 IN | DIASTOLIC BLOOD PRESSURE: 51 MMHG | BODY MASS INDEX: 24.8 KG/M2 | HEART RATE: 83 BPM | SYSTOLIC BLOOD PRESSURE: 99 MMHG

## 2017-12-15 DIAGNOSIS — R07.89 MUSCULOSKELETAL CHEST PAIN: ICD-10-CM

## 2017-12-15 DIAGNOSIS — R07.9 CHEST PAIN, UNSPECIFIED TYPE: Primary | ICD-10-CM

## 2017-12-15 LAB
ALBUMIN SERPL-MCNC: 4 G/DL (ref 3.2–4.8)
ALBUMIN/GLOB SERPL: 1.5 G/DL (ref 1.5–2.5)
ALP SERPL-CCNC: 87 U/L (ref 25–100)
ALT SERPL W P-5'-P-CCNC: 17 U/L (ref 7–40)
ANION GAP SERPL CALCULATED.3IONS-SCNC: 11 MMOL/L (ref 3–11)
AST SERPL-CCNC: 20 U/L (ref 0–33)
BASOPHILS # BLD AUTO: 0.05 10*3/MM3 (ref 0–0.2)
BASOPHILS NFR BLD AUTO: 0.7 % (ref 0–1)
BILIRUB SERPL-MCNC: 0.3 MG/DL (ref 0.3–1.2)
BNP SERPL-MCNC: 7 PG/ML (ref 0–100)
BUN BLD-MCNC: 10 MG/DL (ref 9–23)
BUN/CREAT SERPL: 14.3 (ref 7–25)
CALCIUM SPEC-SCNC: 9.3 MG/DL (ref 8.7–10.4)
CHLORIDE SERPL-SCNC: 107 MMOL/L (ref 99–109)
CO2 SERPL-SCNC: 27 MMOL/L (ref 20–31)
CREAT BLD-MCNC: 0.7 MG/DL (ref 0.6–1.3)
D DIMER PPP FEU-MCNC: <0.19 MG/L (FEU) (ref 0–0.5)
DEPRECATED RDW RBC AUTO: 48.7 FL (ref 37–54)
EOSINOPHIL # BLD AUTO: 0.14 10*3/MM3 (ref 0–0.3)
EOSINOPHIL NFR BLD AUTO: 1.8 % (ref 0–3)
ERYTHROCYTE [DISTWIDTH] IN BLOOD BY AUTOMATED COUNT: 14.4 % (ref 11.3–14.5)
GFR SERPL CREATININE-BSD FRML MDRD: 93 ML/MIN/1.73
GLOBULIN UR ELPH-MCNC: 2.7 GM/DL
GLUCOSE BLD-MCNC: 116 MG/DL (ref 70–100)
HCT VFR BLD AUTO: 39.2 % (ref 34.5–44)
HGB BLD-MCNC: 12.4 G/DL (ref 11.5–15.5)
HOLD SPECIMEN: NORMAL
HOLD SPECIMEN: NORMAL
IMM GRANULOCYTES # BLD: 0.04 10*3/MM3 (ref 0–0.03)
IMM GRANULOCYTES NFR BLD: 0.5 % (ref 0–0.6)
LIPASE SERPL-CCNC: 50 U/L (ref 6–51)
LYMPHOCYTES # BLD AUTO: 2.29 10*3/MM3 (ref 0.6–4.8)
LYMPHOCYTES NFR BLD AUTO: 30.1 % (ref 24–44)
MCH RBC QN AUTO: 29.8 PG (ref 27–31)
MCHC RBC AUTO-ENTMCNC: 31.6 G/DL (ref 32–36)
MCV RBC AUTO: 94.2 FL (ref 80–99)
MONOCYTES # BLD AUTO: 0.8 10*3/MM3 (ref 0–1)
MONOCYTES NFR BLD AUTO: 10.5 % (ref 0–12)
NEUTROPHILS # BLD AUTO: 4.3 10*3/MM3 (ref 1.5–8.3)
NEUTROPHILS NFR BLD AUTO: 56.4 % (ref 41–71)
PLATELET # BLD AUTO: 270 10*3/MM3 (ref 150–450)
PMV BLD AUTO: 9.3 FL (ref 6–12)
POTASSIUM BLD-SCNC: 4.4 MMOL/L (ref 3.5–5.5)
PROT SERPL-MCNC: 6.7 G/DL (ref 5.7–8.2)
RBC # BLD AUTO: 4.16 10*6/MM3 (ref 3.89–5.14)
SODIUM BLD-SCNC: 145 MMOL/L (ref 132–146)
TROPONIN I SERPL-MCNC: 0 NG/ML (ref 0–0.07)
TROPONIN I SERPL-MCNC: 0.01 NG/ML (ref 0–0.07)
WBC NRBC COR # BLD: 7.62 10*3/MM3 (ref 3.5–10.8)
WHOLE BLOOD HOLD SPECIMEN: NORMAL
WHOLE BLOOD HOLD SPECIMEN: NORMAL

## 2017-12-15 PROCEDURE — 84484 ASSAY OF TROPONIN QUANT: CPT

## 2017-12-15 PROCEDURE — 85379 FIBRIN DEGRADATION QUANT: CPT | Performed by: EMERGENCY MEDICINE

## 2017-12-15 PROCEDURE — 83880 ASSAY OF NATRIURETIC PEPTIDE: CPT | Performed by: EMERGENCY MEDICINE

## 2017-12-15 PROCEDURE — 99284 EMERGENCY DEPT VISIT MOD MDM: CPT

## 2017-12-15 PROCEDURE — 93005 ELECTROCARDIOGRAM TRACING: CPT | Performed by: EMERGENCY MEDICINE

## 2017-12-15 PROCEDURE — 85025 COMPLETE CBC W/AUTO DIFF WBC: CPT | Performed by: EMERGENCY MEDICINE

## 2017-12-15 PROCEDURE — 71010 HC CHEST PA OR AP: CPT

## 2017-12-15 PROCEDURE — 83690 ASSAY OF LIPASE: CPT | Performed by: EMERGENCY MEDICINE

## 2017-12-15 PROCEDURE — 80053 COMPREHEN METABOLIC PANEL: CPT | Performed by: EMERGENCY MEDICINE

## 2017-12-15 RX ORDER — ASPIRIN 81 MG/1
324 TABLET, CHEWABLE ORAL ONCE
Status: COMPLETED | OUTPATIENT
Start: 2017-12-15 | End: 2017-12-15

## 2017-12-15 RX ORDER — CYCLOBENZAPRINE HCL 10 MG
10 TABLET ORAL 3 TIMES DAILY PRN
Qty: 12 TABLET | Refills: 0 | Status: SHIPPED | OUTPATIENT
Start: 2017-12-15 | End: 2017-12-19

## 2017-12-15 RX ORDER — PREDNISONE 1 MG/1
5 TABLET ORAL DAILY
COMMUNITY
End: 2018-02-02

## 2017-12-15 RX ORDER — SODIUM CHLORIDE 0.9 % (FLUSH) 0.9 %
10 SYRINGE (ML) INJECTION AS NEEDED
Status: DISCONTINUED | OUTPATIENT
Start: 2017-12-15 | End: 2017-12-15 | Stop reason: HOSPADM

## 2017-12-15 RX ORDER — FOLIC ACID 1 MG/1
1 TABLET ORAL DAILY
COMMUNITY
End: 2018-08-07

## 2017-12-15 RX ORDER — CELECOXIB 100 MG/1
100 CAPSULE ORAL DAILY
COMMUNITY
End: 2018-03-15

## 2017-12-15 RX ORDER — DIAZEPAM 5 MG/1
5 TABLET ORAL ONCE
Status: COMPLETED | OUTPATIENT
Start: 2017-12-15 | End: 2017-12-15

## 2017-12-15 RX ADMIN — DIAZEPAM 5 MG: 5 TABLET ORAL at 11:48

## 2017-12-15 RX ADMIN — ASPIRIN 81 MG 324 MG: 81 TABLET ORAL at 11:48

## 2017-12-15 NOTE — ED PROVIDER NOTES
Subjective   HPI Comments: Umberto Oliva is a 42 y.o.female who presents to the ED with c/o left sided intermittent chest pain with onset this morning around 0530 after waking up. She describes the pain as a pressure that radiates to her left shoulder blade. She rates the pain at a 7/10 currently and denies any similar sensations in the past. She notes that she started B12 injections in her stomach last night. The patient states that she is also experiencing pain when taking a deep breath but denies any fevers, long car/plane rides, swelling in her calves or thighs, rashes, or any other complaints at this time. She reports a FHx with her grandmother who had an MI in her 50's, but she did not have a bypass surgery or stents placed. She denies a FHx of CAD in her immediate family, smoking, HTN, HLD, or DM.        Patient is a 42 y.o. female presenting with chest pain.   History provided by:  Patient  Chest Pain   Pain location:  L chest  Pain quality: pressure    Pain radiates to:  L shoulder (Shoulder blade)  Pain severity:  Moderate (7/10)  Onset quality:  Sudden  Duration: Around 6.5 hours.  Timing:  Intermittent  Progression:  Unchanged  Chronicity:  New  Relieved by:  Nothing  Worsened by:  Deep breathing  Ineffective treatments:  None tried  Associated symptoms: no fever    Risk factors: no coronary artery disease, no diabetes mellitus, no high cholesterol, no hypertension, not male and no smoking        Review of Systems   Constitutional: Negative for fever.   Respiratory:        Pain with deep breathing.   Cardiovascular: Positive for chest pain (Left sided, intermittent). Negative for leg swelling.   Musculoskeletal:        Pain in left shoulder blade secondary to the chest pain.   Skin: Negative for rash.   All other systems reviewed and are negative.      Past Medical History:   Diagnosis Date   • Endometriosis    • Headache    • Kidney stone    • Ovarian cyst    • Rheumatoid arthritis        No Known  Allergies    Past Surgical History:   Procedure Laterality Date   • COLON SURGERY      BOWEL BLOCKAGE  AGE 15   • DIAGNOSTIC LAPAROSCOPY      TIMES 4   • EXPLORATORY LAPAROTOMY  2006   • HYSTERECTOMY  07/24/2012    [facility] BS&[facility]       Family History   Problem Relation Age of Onset   • Breast cancer Other    • Osteoarthritis Mother    • Hypertension Father    • Diabetes Other    • Heart attack Other    • Hypertension Other    • Stroke Other        Social History     Social History   • Marital status:      Spouse name: N/A   • Number of children: N/A   • Years of education: N/A     Social History Main Topics   • Smoking status: Never Smoker   • Smokeless tobacco: Never Used   • Alcohol use No   • Drug use: No   • Sexual activity: Yes     Partners: Male     Birth control/ protection: Surgical     Other Topics Concern   • None     Social History Narrative   • None         Objective   Physical Exam   Constitutional: She is oriented to person, place, and time. She appears well-developed and well-nourished. No distress.   HENT:   Head: Normocephalic and atraumatic.   Nose: Nose normal.   Eyes: Conjunctivae are normal. No scleral icterus.   Neck: Normal range of motion. Neck supple.   Cardiovascular: Normal rate, regular rhythm and normal heart sounds.    No murmur heard.  Pulmonary/Chest: Effort normal and breath sounds normal. No respiratory distress.   Abdominal: Soft. There is no tenderness.   Musculoskeletal: Normal range of motion. She exhibits tenderness (TTP during flexion and extension of the left shoulder; with movement of the scapula primarily.). She exhibits no edema (No swelling in calves or thighs.).   Neurological: She is alert and oriented to person, place, and time.   Skin: Skin is warm and dry.   Psychiatric: She has a normal mood and affect. Her behavior is normal.   Nursing note and vitals reviewed.      Procedures         ED Course  ED Course     Recent Results (from the past 24 hour(s))   POC  Troponin, Rapid    Collection Time: 12/15/17  1:34 PM   Result Value Ref Range    Troponin I 0.00 0.00 - 0.07 ng/mL     Note: In addition to lab results from this visit, the labs listed above may include labs taken at another facility or during a different encounter within the last 24 hours. Please correlate lab times with ED admission and discharge times for further clarification of the services performed during this visit.    XR Chest 1 View   Final Result   Negative chest for active disease.       D:  12/15/2017   E:  12/15/2017       This report was finalized on 12/15/2017 2:27 PM by Dr. Carlito Chase MD.            Vitals:    12/15/17 1222 12/15/17 1322 12/15/17 1357 12/15/17 1421   BP: 98/73 100/80 102/70 99/51   Pulse: 88 79 85 83   Resp:    18   Temp:       SpO2: 99% 98% 98% 98%   Weight:       Height:         Medications   aspirin chewable tablet 324 mg (324 mg Oral Given 12/15/17 1148)   diazePAM (VALIUM) tablet 5 mg (5 mg Oral Given 12/15/17 1148)     ECG/EMG Results (last 24 hours)     Procedure Component Value Units Date/Time    ECG 12 Lead [[id number]] Collected:  12/15/17 1112     Updated:  12/15/17 1115        Results for EVERETTE SWARTZ EVERARDO (MRN [id number]) as of 12/16/2017 13:04   Ref. Range 12/15/2017 12:18   Glucose Latest Ref Range: 70 - 100 mg/dL 116 (H)   Sodium Latest Ref Range: 132 - 146 mmol/L 145   Potassium Latest Ref Range: 3.5 - 5.5 mmol/L 4.4   CO2 Latest Ref Range: 20.0 - 31.0 mmol/L 27.0   Chloride Latest Ref Range: 99 - 109 mmol/L 107   Anion Gap Latest Ref Range: 3.0 - 11.0 mmol/L 11.0   Creatinine Latest Ref Range: 0.60 - 1.30 mg/dL 0.70   BUN Latest Ref Range: 9 - 23 mg/dL 10   BUN/Creatinine Ratio Latest Ref Range: 7.0 - 25.0  14.3   Calcium Latest Ref Range: 8.7 - 10.4 mg/dL 9.3   eGFR Non African Amer Latest Ref Range: >60 mL/min/1.73 93   Alkaline Phosphatase Latest Ref Range: 25 - 100 U/L 87   Total Protein Latest Ref Range: 5.7 - 8.2 g/dL 6.7   ALT (SGPT) Latest Ref Range: 7 -  40 U/L 17   AST (SGOT) Latest Ref Range: 0 - 33 U/L 20   Total Bilirubin Latest Ref Range: 0.3 - 1.2 mg/dL 0.3   Albumin Latest Ref Range: 3.20 - 4.80 g/dL 4.00   Globulin Latest Units: gm/dL 2.7   A/G Ratio Latest Ref Range: 1.5 - 2.5 g/dL 1.5   Results for EVERETTE SWARTZ (MRN [id number]) as of 12/16/2017 13:04   Ref. Range 12/15/2017 11:38   D-dimer, Quant Latest Ref Range: 0.00 - 0.50 mg/L (FEU) <0.19   WBC Latest Ref Range: 3.50 - 10.80 10*3/mm3 7.62   RBC Latest Ref Range: 3.89 - 5.14 10*6/mm3 4.16   Hemoglobin Latest Ref Range: 11.5 - 15.5 g/dL 12.4   Hematocrit Latest Ref Range: 34.5 - 44.0 % 39.2   RDW Latest Ref Range: 11.3 - 14.5 % 14.4   MCV Latest Ref Range: 80.0 - 99.0 fL 94.2   MCH Latest Ref Range: 27.0 - 31.0 pg 29.8   MCHC Latest Ref Range: 32.0 - 36.0 g/dL 31.6 (L)   MPV Latest Ref Range: 6.0 - 12.0 fL 9.3   Platelets Latest Ref Range: 150 - 450 10*3/mm3 270   RDW-SD Latest Ref Range: 37.0 - 54.0 fl 48.7   Neutrophil % Latest Ref Range: 41.0 - 71.0 % 56.4   Lymphocyte % Latest Ref Range: 24.0 - 44.0 % 30.1   Monocyte % Latest Ref Range: 0.0 - 12.0 % 10.5   Eosinophil % Latest Ref Range: 0.0 - 3.0 % 1.8   Basophil % Latest Ref Range: 0.0 - 1.0 % 0.7   Immature Grans % Latest Ref Range: 0.0 - 0.6 % 0.5   Neutrophils, Absolute Latest Ref Range: 1.50 - 8.30 10*3/mm3 4.30   Lymphocytes, Absolute Latest Ref Range: 0.60 - 4.80 10*3/mm3 2.29   Monocytes, Absolute Latest Ref Range: 0.00 - 1.00 10*3/mm3 0.80   Eosinophils, Absolute Latest Ref Range: 0.00 - 0.30 10*3/mm3 0.14   Basophils, Absolute Latest Ref Range: 0.00 - 0.20 10*3/mm3 0.05   Immature Grans, Absolute Latest Ref Range: 0.00 - 0.03 10*3/mm3 0.04 (H)     Results for EVERETTE SWARTZ (MRN [id number]) as of 12/16/2017 13:04   Ref. Range 12/15/2017 11:42   Troponin I Latest Ref Range: 0.00 - 0.07 ng/mL 0.01             HEART Score (for prediction of 6-week risk of major adverse cardiac event) reviewed and/or performed as part of the patient  evaluation and treatment planning process.  The result associated with this review/performance is: 1     EKG had no significant change from February 1, 2017.        MDM    Final diagnoses:   Chest pain, unspecified type   Musculoskeletal chest pain       Documentation assistance provided by howard Veronica.  Information recorded by the [facility] was done at my direction and has been verified and validated by me.     Kym Veronica  12/15/17 1151       Kym Veronica  12/15/17 1420       Danilo Garcia DO  12/16/17 1314

## 2017-12-19 ENCOUNTER — OFFICE VISIT (OUTPATIENT)
Dept: INTERNAL MEDICINE | Facility: CLINIC | Age: 42
End: 2017-12-19

## 2017-12-19 VITALS
DIASTOLIC BLOOD PRESSURE: 70 MMHG | HEART RATE: 116 BPM | WEIGHT: 140 LBS | SYSTOLIC BLOOD PRESSURE: 102 MMHG | OXYGEN SATURATION: 98 % | RESPIRATION RATE: 12 BRPM | HEIGHT: 63 IN | BODY MASS INDEX: 24.8 KG/M2 | TEMPERATURE: 98 F

## 2017-12-19 DIAGNOSIS — J18.9 PNEUMONIA OF LEFT LOWER LOBE DUE TO INFECTIOUS ORGANISM: Primary | ICD-10-CM

## 2017-12-19 PROCEDURE — 99213 OFFICE O/P EST LOW 20 MIN: CPT | Performed by: FAMILY MEDICINE

## 2017-12-19 RX ORDER — AZITHROMYCIN 250 MG/1
TABLET, FILM COATED ORAL
Qty: 6 TABLET | Refills: 0 | Status: SHIPPED | OUTPATIENT
Start: 2017-12-19 | End: 2018-02-02

## 2017-12-21 ENCOUNTER — TELEPHONE (OUTPATIENT)
Dept: INTERNAL MEDICINE | Facility: CLINIC | Age: 42
End: 2017-12-21

## 2017-12-21 RX ORDER — LEVOFLOXACIN 500 MG/1
500 TABLET, FILM COATED ORAL DAILY
Qty: 7 TABLET | Refills: 0 | Status: SHIPPED | OUTPATIENT
Start: 2017-12-21 | End: 2018-02-02

## 2017-12-21 NOTE — TELEPHONE ENCOUNTER
I sent in a stronger antibiotic.  Sent [employer] message telling her to stop azithromycin and start the levofloxacin.

## 2017-12-21 NOTE — TELEPHONE ENCOUNTER
Patient was seen on 12/19/17 and states that Dr. Umanzor advised her to call back if symptoms do no improve and she would call in something else. Patient states symptoms are no better.     [facility] [place]

## 2017-12-27 ENCOUNTER — TELEPHONE (OUTPATIENT)
Dept: INTERNAL MEDICINE | Facility: CLINIC | Age: 42
End: 2017-12-27

## 2017-12-27 NOTE — TELEPHONE ENCOUNTER
Patient began taking 2nd antibiotic that was prescribed and began having bad migraines with dizziness. Patient ended up finishing the 1st prescribed antibiotic.  Patient states that she has began hurting in the left side behind ribs.     Request call back due to concern of pain in left side.

## 2017-12-28 NOTE — TELEPHONE ENCOUNTER
Spoke with pt. She said she started Levaquin Thurs night and took it Fri. She got very dizzy and sick over the weekend and d/c'd the Levaquin. She finished the Z-pack that was written initially. She said the pain she is having now is in back under ribs. Last week when she was seen the pain was upper near shoulder blades. She still has a cough, no fever. She is not sure if it is the pneumonia still or not. She had a lot of pain yesterday.

## 2017-12-29 NOTE — TELEPHONE ENCOUNTER
Pt notified, she understands and will wait it out. If no better next week, she will call and schedule appt.

## 2017-12-29 NOTE — TELEPHONE ENCOUNTER
Cough will continue, no matter the antibiotics.  If the fever is gone then the infection is treated.  She may have pulled a muscle from coughing, also if she is not well hydrated it will make everything hurt more.  The pain isn't from pneumonia.

## 2018-02-02 ENCOUNTER — OFFICE VISIT (OUTPATIENT)
Dept: INTERNAL MEDICINE | Facility: CLINIC | Age: 43
End: 2018-02-02

## 2018-02-02 VITALS
HEART RATE: 114 BPM | DIASTOLIC BLOOD PRESSURE: 72 MMHG | SYSTOLIC BLOOD PRESSURE: 104 MMHG | WEIGHT: 145 LBS | OXYGEN SATURATION: 99 % | BODY MASS INDEX: 25.69 KG/M2 | HEIGHT: 63 IN | TEMPERATURE: 98.6 F

## 2018-02-02 DIAGNOSIS — G43.C0 PERIODIC HEADACHE SYNDROME, NOT INTRACTABLE: ICD-10-CM

## 2018-02-02 DIAGNOSIS — J02.0 STREP PHARYNGITIS: Primary | ICD-10-CM

## 2018-02-02 DIAGNOSIS — R11.0 NAUSEA: ICD-10-CM

## 2018-02-02 LAB
EXPIRATION DATE: ABNORMAL
EXPIRATION DATE: NORMAL
FLUAV AG NPH QL: NEGATIVE
FLUBV AG NPH QL: NEGATIVE
INTERNAL CONTROL: ABNORMAL
INTERNAL CONTROL: NORMAL
Lab: ABNORMAL
Lab: NORMAL
S PYO AG THROAT QL: POSITIVE

## 2018-02-02 PROCEDURE — 99213 OFFICE O/P EST LOW 20 MIN: CPT | Performed by: PHYSICIAN ASSISTANT

## 2018-02-02 PROCEDURE — 87880 STREP A ASSAY W/OPTIC: CPT | Performed by: PHYSICIAN ASSISTANT

## 2018-02-02 PROCEDURE — 96372 THER/PROPH/DIAG INJ SC/IM: CPT | Performed by: PHYSICIAN ASSISTANT

## 2018-02-02 PROCEDURE — 87804 INFLUENZA ASSAY W/OPTIC: CPT | Performed by: PHYSICIAN ASSISTANT

## 2018-02-02 RX ORDER — ERGOCALCIFEROL 1.25 MG/1
CAPSULE ORAL
Refills: 0 | COMMUNITY
Start: 2017-12-20 | End: 2018-04-13

## 2018-02-02 RX ORDER — SUMATRIPTAN 100 MG/1
100 TABLET, FILM COATED ORAL ONCE AS NEEDED
Qty: 9 TABLET | Refills: 5 | Status: SHIPPED | OUTPATIENT
Start: 2018-02-02 | End: 2019-02-26 | Stop reason: SDUPTHER

## 2018-02-02 RX ORDER — PROMETHAZINE HYDROCHLORIDE 25 MG/1
25 TABLET ORAL EVERY 6 HOURS PRN
Qty: 20 TABLET | Refills: 0 | Status: SHIPPED | OUTPATIENT
Start: 2018-02-02 | End: 2018-03-15 | Stop reason: SDUPTHER

## 2018-02-02 NOTE — PROGRESS NOTES
Umberto Oliva is a 42 y.o. female.     Subjective   History of Present Illness   Has had a headache for 3 days which feels like a migraine including seeing spots and nausea. She has used Imitrex at onset 3 days ago and once this morning which is not helping the headache.  This morning she developed scratchy throat and when she looked at it she thought she saw white patches on the left side. Denies fever or chills.           The following portions of the patient's history were reviewed and updated as appropriate: allergies, current medications, past family history, past medical history, past social history, past surgical history and problem list.    Review of Systems    Constitutional: Negative for appetite change, chills, fatigue, fever and unexpected weight change.   HENT: sore throat. Negative for congestion, ear pain, hearing loss, nosebleeds, postnasal drip, rhinorrhea, tinnitus and trouble swallowing.    Eyes: visual disturbance. Negative for photophobia, discharge.  Respiratory: Negative for cough, chest tightness, shortness of breath and wheezing.    Cardiovascular: Negative for chest pain, palpitations and leg swelling.   Gastrointestinal: nausea. Negative for abdominal distention, abdominal pain, blood in stool, constipation, diarrhea and vomiting.   Endocrine: Negative for cold intolerance, heat intolerance, polydipsia, polyphagia and polyuria.   Musculoskeletal: Negative for back pain, myalgias, neck pain and neck stiffness.   Skin: Negative for color change, pallor, rash and wound.   Allergic/Immunologic: Negative for environmental allergies, food allergies and immunocompromised state.   Neurological: headache. Negative for dizziness, tremors, seizures, weakness, numbness.  Hematological: Negative for adenopathy. Does not bruise/bleed easily.   Psychiatric/Behavioral: Negative for sleep disturbances, agitation, behavioral problems, confusion, hallucinations, self-injury and suicidal ideas. The patient is  "not nervous/anxious.      Objective    Physical Exam  Constitutional: Oriented to person, place, and time. Appears well-developed and well-nourished.   HENT: mild OP erythema without exudate. White PND present.   Head: no sinus tenderness. Normocephalic and atraumatic.   Eyes: EOM are normal. Pupils are equal, round, and reactive to light.   Neck: Normal range of motion. Neck supple.   Cardiovascular: Normal rate, regular rhythm and normal heart sounds.    Pulmonary/Chest: Effort normal and breath sounds normal. No respiratory distress.  Has no wheezes or rales. Exhibits no chest wall tenderness.   Abdominal: Soft. Bowel sounds are normal. Exhibits no distension and no mass. There is no tenderness.   Musculoskeletal: Normal range of motion. Exhibits no tenderness.   Neurological: Alert and oriented to person, place, and time.   Skin: Skin is warm and dry.   Psychiatric: Has a normal mood and affect. Behavior is normal. Judgment and thought content normal.       /72  Pulse 114  Temp 98.6 °F (37 °C)  Ht 160 cm (62.99\")  Wt 65.8 kg (145 lb)  SpO2 99%  BMI 25.69 kg/m2    Nursing note and vitals reviewed.        Assessment/Plan   Umberto was seen today for headache, nausea and sore throat.    Diagnoses and all orders for this visit:    Strep pharyngitis  -     POC Influenza A / B- negative  -     POC Rapid Strep A- positive  -     penicillin G benzathine (BICILLIN-LA) injection 1.2 Million Units; Inject 2 mL into the shoulder, thigh, or buttocks 1 (One) Time.    Periodic headache syndrome, not intractable  -     SUMAtriptan (IMITREX) 100 MG tablet; Take 1 tablet by mouth 1 (One) Time As Needed for Migraine (may repeat once in 2 hours if needed) for up to 1 dose.    Nausea  -     promethazine (PHENERGAN) 25 MG tablet; Take 1 tablet by mouth Every 6 (Six) Hours As Needed for Nausea or Vomiting.    Encouraged her to increase PO fluid intake and use Tylenol for headache and sore throat control.     Call or RTC if " symptoms worsen or persist.

## 2018-02-05 RX ORDER — AMOXICILLIN 500 MG/1
500 CAPSULE ORAL 2 TIMES DAILY
Qty: 14 CAPSULE | Refills: 0 | Status: SHIPPED | OUTPATIENT
Start: 2018-02-05 | End: 2018-02-12

## 2018-02-15 ENCOUNTER — OFFICE VISIT (OUTPATIENT)
Dept: INTERNAL MEDICINE | Facility: CLINIC | Age: 43
End: 2018-02-15

## 2018-02-15 VITALS
HEART RATE: 98 BPM | DIASTOLIC BLOOD PRESSURE: 70 MMHG | TEMPERATURE: 99.5 F | SYSTOLIC BLOOD PRESSURE: 100 MMHG | HEIGHT: 63 IN | BODY MASS INDEX: 25.69 KG/M2 | OXYGEN SATURATION: 98 % | WEIGHT: 145 LBS | RESPIRATION RATE: 14 BRPM

## 2018-02-15 DIAGNOSIS — J02.0 STREP PHARYNGITIS: Primary | ICD-10-CM

## 2018-02-15 PROCEDURE — 99213 OFFICE O/P EST LOW 20 MIN: CPT | Performed by: INTERNAL MEDICINE

## 2018-02-15 RX ORDER — CLINDAMYCIN HYDROCHLORIDE 300 MG/1
300 CAPSULE ORAL 3 TIMES DAILY
Qty: 30 CAPSULE | Refills: 0 | Status: SHIPPED | OUTPATIENT
Start: 2018-02-15 | End: 2018-02-19

## 2018-02-15 NOTE — PROGRESS NOTES
Subjective   Umberto Oliva is a 43 y.o. female.     Chief Complaint   Patient presents with   • Headache     was dx with strep aprox 2 weeks ago still no better    • Sore Throat   • Dizziness   • Generalized Body Aches       Headache    This is a new problem. The current episode started 1 to 4 weeks ago (2 week). The problem occurs constantly. The problem has been unchanged. The pain is located in the bilateral region. The pain does not radiate. The pain quality is not similar to prior headaches. The quality of the pain is described as aching. The pain is at a severity of 8/10. Associated symptoms include dizziness, a fever, nausea, scalp tenderness, a sore throat, swollen glands and weakness. Pertinent negatives include no blurred vision, coughing, drainage, ear pain, phonophobia, photophobia, rhinorrhea, sinus pressure or vomiting. Nothing aggravates the symptoms. Treatments tried: imitrex. The treatment provided no relief.   Sore Throat    This is a new problem. The current episode started 1 to 4 weeks ago (2 week). The problem has been unchanged. Sore throat worse side: ricardo. Maximum temperature: low grade fever. The pain is at a severity of 7/10. The pain is moderate. Associated symptoms include headaches and swollen glands. Pertinent negatives include no coughing, ear pain or vomiting. She has had exposure to strep. Treatments tried: PCN. The treatment provided no relief.   Dizziness   Associated symptoms include a fever, headaches, nausea, a sore throat, swollen glands and weakness. Pertinent negatives include no coughing or vomiting.          Current Outpatient Prescriptions:   •  celecoxib (CeleBREX) 100 MG capsule, Take 100 mg by mouth Daily., Disp: , Rfl:   •  Cyanocobalamin (B-12) 1000 MCG/ML kit, Inject  as directed., Disp: , Rfl:   •  estradiol (ESTRACE) 0.5 MG tablet, Take 1 tablet by mouth Daily., Disp: , Rfl:   •  folic acid (FOLVITE) 1 MG tablet, Take 1 mg by mouth Daily., Disp: , Rfl:   •   Methotrexate, PF, 15 MG/0.3ML solution auto-injector, Inject  under the skin., Disp: , Rfl:   •  promethazine (PHENERGAN) 25 MG tablet, Take 1 tablet by mouth Every 6 (Six) Hours As Needed for Nausea or Vomiting., Disp: 20 tablet, Rfl: 0  •  SUMAtriptan (IMITREX) 100 MG tablet, Take 1 tablet by mouth 1 (One) Time As Needed for Migraine (may repeat once in 2 hours if needed) for up to 1 dose., Disp: 9 tablet, Rfl: 5  •  vitamin D (ERGOCALCIFEROL) 04262 units capsule capsule, TAKE 1 CAPSULE BY MOUTH ONE TIME A WEEK, Disp: , Rfl: 0  •  clindamycin (CLEOCIN) 300 MG capsule, Take 1 capsule by mouth 3 (Three) Times a Day., Disp: 30 capsule, Rfl: 0    The following portions of the patient's history were reviewed and updated as appropriate: allergies, current medications, past family history, past medical history, past social history, past surgical history and problem list.    Review of Systems   Constitutional: Positive for fever.   HENT: Positive for sore throat. Negative for ear pain, rhinorrhea and sinus pressure.    Eyes: Negative for blurred vision and photophobia.   Respiratory: Negative for cough.    Gastrointestinal: Positive for nausea. Negative for vomiting.   Neurological: Positive for dizziness, weakness and headaches.       Objective   Physical Exam   Constitutional: She is oriented to person, place, and time. She appears well-developed and well-nourished.   HENT:   Head: Normocephalic and atraumatic.   Tm bulge  Throat erythema and lymphatic hyperplasia   Eyes: Pupils are equal, round, and reactive to light.   Neck: Normal range of motion. Neck supple.   Cardiovascular: Normal rate, regular rhythm and normal heart sounds.    Pulmonary/Chest: Effort normal and breath sounds normal.   Abdominal: Soft. Bowel sounds are normal.   Neurological: She is alert and oriented to person, place, and time.   Psychiatric: She has a normal mood and affect. Her behavior is normal.       All tests have been  reviewed.    Assessment/Plan   Diagnoses and all orders for this visit:    Strep pharyngitis    Other orders  -     clindamycin (CLEOCIN) 300 MG capsule; Take 1 capsule by mouth 3 (Three) Times a Day.           tylenol, water, salt water goggle and zyrtec , rest, exedrin, call if no better

## 2018-02-19 ENCOUNTER — TELEPHONE (OUTPATIENT)
Dept: INTERNAL MEDICINE | Facility: CLINIC | Age: 43
End: 2018-02-19

## 2018-02-19 RX ORDER — CEPHALEXIN 500 MG/1
500 CAPSULE ORAL 3 TIMES DAILY
Qty: 21 CAPSULE | Refills: 0 | Status: SHIPPED | OUTPATIENT
Start: 2018-02-19 | End: 2018-03-15

## 2018-02-19 NOTE — TELEPHONE ENCOUNTER
Patient called and states she was given clindamycin on Thursday and states she can not tolerate it. She states it makes her nauseous and sick to her stomach. She is requesting a change in antibiotics.

## 2018-03-15 ENCOUNTER — OFFICE VISIT (OUTPATIENT)
Dept: INTERNAL MEDICINE | Facility: CLINIC | Age: 43
End: 2018-03-15

## 2018-03-15 VITALS
SYSTOLIC BLOOD PRESSURE: 112 MMHG | BODY MASS INDEX: 25.9 KG/M2 | HEART RATE: 102 BPM | RESPIRATION RATE: 12 BRPM | WEIGHT: 146.19 LBS | OXYGEN SATURATION: 98 % | TEMPERATURE: 98.5 F | DIASTOLIC BLOOD PRESSURE: 76 MMHG | HEIGHT: 63 IN

## 2018-03-15 DIAGNOSIS — R51.9 NONINTRACTABLE HEADACHE, UNSPECIFIED CHRONICITY PATTERN, UNSPECIFIED HEADACHE TYPE: Primary | ICD-10-CM

## 2018-03-15 DIAGNOSIS — R11.0 NAUSEA: ICD-10-CM

## 2018-03-15 LAB
BILIRUB BLD-MCNC: NEGATIVE MG/DL
CLARITY, POC: CLEAR
COLOR UR: YELLOW
GLUCOSE UR STRIP-MCNC: NEGATIVE MG/DL
KETONES UR QL: NEGATIVE
LEUKOCYTE EST, POC: NEGATIVE
NITRITE UR-MCNC: NEGATIVE MG/ML
PH UR: 7 [PH] (ref 5–8)
PROT UR STRIP-MCNC: NEGATIVE MG/DL
RBC # UR STRIP: NEGATIVE /UL
SP GR UR: 1.01 (ref 1–1.03)
UROBILINOGEN UR QL: NORMAL

## 2018-03-15 PROCEDURE — 99213 OFFICE O/P EST LOW 20 MIN: CPT | Performed by: NURSE PRACTITIONER

## 2018-03-15 PROCEDURE — 81003 URINALYSIS AUTO W/O SCOPE: CPT | Performed by: NURSE PRACTITIONER

## 2018-03-15 PROCEDURE — 96372 THER/PROPH/DIAG INJ SC/IM: CPT | Performed by: NURSE PRACTITIONER

## 2018-03-15 RX ORDER — PROMETHAZINE HYDROCHLORIDE 25 MG/1
25 TABLET ORAL EVERY 6 HOURS PRN
Qty: 20 TABLET | Refills: 0 | Status: SHIPPED | OUTPATIENT
Start: 2018-03-15 | End: 2019-02-26 | Stop reason: SDUPTHER

## 2018-03-15 RX ORDER — OMEPRAZOLE 20 MG/1
CAPSULE, DELAYED RELEASE ORAL
Refills: 5 | COMMUNITY
Start: 2017-12-12 | End: 2019-09-24

## 2018-03-15 RX ORDER — TRIAMCINOLONE ACETONIDE 1 MG/G
CREAM TOPICAL
Refills: 99 | COMMUNITY
Start: 2018-02-05 | End: 2018-03-29

## 2018-03-15 RX ORDER — CELECOXIB 200 MG/1
CAPSULE ORAL
Refills: 5 | COMMUNITY
Start: 2018-03-03 | End: 2018-04-13

## 2018-03-15 RX ORDER — AMITRIPTYLINE HYDROCHLORIDE 10 MG/1
TABLET, FILM COATED ORAL
Refills: 5 | COMMUNITY
Start: 2017-12-12 | End: 2018-03-29

## 2018-03-15 RX ORDER — KETOROLAC TROMETHAMINE 30 MG/ML
30 INJECTION, SOLUTION INTRAMUSCULAR; INTRAVENOUS ONCE
Status: COMPLETED | OUTPATIENT
Start: 2018-03-15 | End: 2018-03-15

## 2018-03-15 RX ADMIN — KETOROLAC TROMETHAMINE 30 MG: 30 INJECTION, SOLUTION INTRAMUSCULAR; INTRAVENOUS at 16:27

## 2018-03-15 NOTE — PROGRESS NOTES
Chief Complaint / Reason:      Chief Complaint   Patient presents with   • Headache     has been treated for strep several times over the last few weeks. Has had several medication changes as well. Heaviness in chest and dizziness has occured.        Subjective     HPI  Patient presents today with headache and states that she has been treated for strep several times over the last few weeks.  She has had medication changes as well and is having some heaviness and chest and dizziness has also occurred she is overdue on her eye exam.  Denies any swelling or shortness of breath.  Denies any changes in vision, numbness or tingling.  Patient has also had MRI of brain in the past.  Other symptoms include nausea     History taken from: patient    PMH/FH/Social History were reviewed and updated appropriately in the electronic medical record.     Review of Systems:   Review of Systems   Respiratory: Positive for chest tightness.    Cardiovascular: Negative.    Gastrointestinal: Positive for nausea.   Neurological: Positive for dizziness, weakness and headaches.     All other systems were reviewed and are negative.  Exceptions are noted in the subjective or above.      Objective     Vital Signs  Vitals:    03/15/18 1436   BP: 112/76   Pulse: 102   Resp: 12   Temp: 98.5 °F (36.9 °C)   SpO2: 98%       Body mass index is 25.9 kg/m².    Physical Exam   Constitutional: She is oriented to person, place, and time. She appears well-developed and well-nourished. No distress.   HENT:   Head: Normocephalic.   Right Ear: External ear normal. Tympanic membrane is erythematous and bulging.   Left Ear: External ear normal. Tympanic membrane is erythematous and bulging.   Nose: Right sinus exhibits maxillary sinus tenderness and frontal sinus tenderness. Left sinus exhibits maxillary sinus tenderness and frontal sinus tenderness.   Mouth/Throat: Mucous membranes are dry. Posterior oropharyngeal erythema present.   Cardiovascular: Regular  rhythm, normal heart sounds and intact distal pulses.  Tachycardia present.    Pulmonary/Chest: Effort normal and breath sounds normal. She has no wheezes. She exhibits no tenderness.   Abdominal: Soft. Bowel sounds are normal. There is no tenderness.   Lymphadenopathy:     She has no cervical adenopathy.   Neurological: She is alert and oriented to person, place, and time.   Skin: Skin is warm and dry. Capillary refill takes less than 2 seconds. No rash noted. No erythema. No pallor.   Psychiatric: She has a normal mood and affect. Her behavior is normal. Judgment and thought content normal.   Nursing note and vitals reviewed.       Results Review:    I reviewed the patient's new clinical results.   Results for orders placed during the hospital encounter of 02/13/17   MRI Brain With & Without Contrast    Narrative PROCEDURE: MRI BRAIN W WO CONTRAST-     HISTORY: vision change, dizziness, cold sweats, weakness; R42-Dizziness  and giddiness; H53.9-Unspecified visual disturbance     PROCEDURE: Multiplanar multisequence imaging of the brain was performed  both before and following the administration of intravenous contrast.     FINDINGS: There are no significant white matter abnormalities. There is  no mass, mass effect or midline shift. There is no hydrocephalus. There  are no areas of restricted diffusion. There is no pathologic contrast  enhancement.     The midbrain, geovanna, cerebellum and craniocervical junction are  unremarkable. The sella and pituitary gland are within normal limits.  The major intracranial vasculature demonstrates the expected flow  related signal. The paranasal sinuses are clear.       Impression Unremarkable MRI of the brain.             This report was finalized on 2/13/2017 11:21 AM by Willow Teague M.D..     Office Visit on 03/15/2018   Component Date Value Ref Range Status   • Color 03/15/2018 Yellow  Yellow, Straw, Dark Yellow, Kaila Final   • Clarity, UA 03/15/2018 Clear  Clear  Final   • Glucose, UA 03/15/2018 Negative  Negative, 1000 mg/dL (3+) mg/dL Final   • Bilirubin 03/15/2018 Negative  Negative Final   • Ketones, UA 03/15/2018 Negative  Negative Final   • Specific Gravity  03/15/2018 1.010  1.005 - 1.030 Final   • Blood, UA 03/15/2018 Negative  Negative Final   • pH, Urine 03/15/2018 7.0  5.0 - 8.0 Final   • Protein, POC 03/15/2018 Negative  Negative mg/dL Final   • Urobilinogen, UA 03/15/2018 Normal  Normal Final   • Leukocytes 03/15/2018 Negative  Negative Final   • Nitrite, UA 03/15/2018 Negative  Negative Final           Medication Review:     Current Outpatient Prescriptions:   •  amitriptyline (ELAVIL) 10 MG tablet, TAKE 1 OR 2 TABLETS BY MOUTH AT BEDTIME AS NEEDED FOR PAIN and sleep  on hold, Disp: , Rfl: 5  •  celecoxib (CeleBREX) 200 MG capsule, TAKE 1 CAPSULE BY MOUTH ONE TIME A DAY AS NEEDED, Disp: , Rfl: 5  •  Cyanocobalamin (B-12) 1000 MCG/ML kit, Inject  as directed., Disp: , Rfl:   •  estradiol (ESTRACE) 0.5 MG tablet, Take 1 tablet by mouth Daily., Disp: , Rfl:   •  folic acid (FOLVITE) 1 MG tablet, Take 1 mg by mouth Daily., Disp: , Rfl:   •  Methotrexate, PF, 15 MG/0.3ML solution auto-injector, Inject  under the skin 1 (One) Time Per Week., Disp: , Rfl:   •  omeprazole (priLOSEC) 20 MG capsule, TAKE 1 CAPSULE BY MOUTH IN THE MORNING AS NEEDED for gastritis, Disp: , Rfl: 5  •  promethazine (PHENERGAN) 25 MG tablet, Take 1 tablet by mouth Every 6 (Six) Hours As Needed for Nausea or Vomiting., Disp: 20 tablet, Rfl: 0  •  SUMAtriptan (IMITREX) 100 MG tablet, Take 1 tablet by mouth 1 (One) Time As Needed for Migraine (may repeat once in 2 hours if needed) for up to 1 dose., Disp: 9 tablet, Rfl: 5  •  triamcinolone (KENALOG) 0.1 % cream, apply to rash twice daily, Disp: , Rfl: 99  •  vitamin D (ERGOCALCIFEROL) 48222 units capsule capsule, TAKE 1 CAPSULE BY MOUTH ONE TIME A WEEK, Disp: , Rfl: 0    Assessment/Plan   Umberto was seen today for headache.    Diagnoses and all  orders for this visit:    Nonintractable headache, unspecified chronicity pattern, unspecified headache type  -     ketorolac (TORADOL) injection 30 mg; Inject 30 mg into the shoulder, thigh, or buttocks 1 (One) Time.  Discussed worsening signs and symptoms with patient recommend hydration and maintain nutrition and adequate rest.  Commend patient taking over-the-counter headache medicine before starting daily medications.  Nausea  -     promethazine (PHENERGAN) 25 MG tablet; Take 1 tablet by mouth Every 6 (Six) Hours As Needed for Nausea or Vomiting.  -     POCT urinalysis dipstick, automated        Return in about 4 weeks (around 4/12/2018), or if symptoms worsen or fail to improve.    Kym Charles, APRN  03/15/2018

## 2018-03-20 ENCOUNTER — TELEPHONE (OUTPATIENT)
Dept: INTERNAL MEDICINE | Facility: CLINIC | Age: 43
End: 2018-03-20

## 2018-03-20 NOTE — TELEPHONE ENCOUNTER
Pt saw you on Thursday and you told her to stop a medication to see if it would help her.  She is still experiencing really bad headaches after stopping the medication.  She is also having pressure in her chest.  Can you please return call to pt and advise.

## 2018-03-21 RX ORDER — TOPIRAMATE 50 MG/1
50 TABLET, FILM COATED ORAL DAILY
Qty: 30 TABLET | Refills: 1 | Status: SHIPPED | OUTPATIENT
Start: 2018-03-21 | End: 2018-03-29

## 2018-03-23 ENCOUNTER — CLINICAL SUPPORT (OUTPATIENT)
Dept: INTERNAL MEDICINE | Facility: CLINIC | Age: 43
End: 2018-03-23

## 2018-03-23 DIAGNOSIS — J02.9 PHARYNGITIS, UNSPECIFIED ETIOLOGY: ICD-10-CM

## 2018-03-23 LAB
EXPIRATION DATE: NORMAL
INTERNAL CONTROL: NORMAL
Lab: NORMAL
S PYO AG THROAT QL: NEGATIVE

## 2018-03-23 PROCEDURE — 87880 STREP A ASSAY W/OPTIC: CPT | Performed by: PHYSICIAN ASSISTANT

## 2018-03-26 ENCOUNTER — APPOINTMENT (OUTPATIENT)
Dept: GENERAL RADIOLOGY | Facility: HOSPITAL | Age: 43
End: 2018-03-26

## 2018-03-26 ENCOUNTER — HOSPITAL ENCOUNTER (EMERGENCY)
Facility: HOSPITAL | Age: 43
Discharge: HOME | End: 2018-03-26
Attending: EMERGENCY MEDICINE | Admitting: EMERGENCY MEDICINE

## 2018-03-26 VITALS
RESPIRATION RATE: 18 BRPM | DIASTOLIC BLOOD PRESSURE: 82 MMHG | TEMPERATURE: 97.8 F | HEART RATE: 73 BPM | OXYGEN SATURATION: 99 % | BODY MASS INDEX: 25.34 KG/M2 | WEIGHT: 143 LBS | HEIGHT: 63 IN | SYSTOLIC BLOOD PRESSURE: 121 MMHG

## 2018-03-26 DIAGNOSIS — R51.9 NONINTRACTABLE HEADACHE, UNSPECIFIED CHRONICITY PATTERN, UNSPECIFIED HEADACHE TYPE: Primary | ICD-10-CM

## 2018-03-26 DIAGNOSIS — R42 VERTIGO: ICD-10-CM

## 2018-03-26 LAB
ALBUMIN SERPL-MCNC: 4.5 G/DL (ref 3.2–4.8)
ALBUMIN/GLOB SERPL: 1.5 G/DL (ref 1.5–2.5)
ALP SERPL-CCNC: 85 U/L (ref 25–100)
ALT SERPL W P-5'-P-CCNC: 20 U/L (ref 7–40)
ANION GAP SERPL CALCULATED.3IONS-SCNC: 8 MMOL/L (ref 3–11)
AST SERPL-CCNC: 26 U/L (ref 0–33)
BASOPHILS # BLD AUTO: 0.05 10*3/MM3 (ref 0–0.2)
BASOPHILS NFR BLD AUTO: 1 % (ref 0–1)
BILIRUB SERPL-MCNC: 0.6 MG/DL (ref 0.3–1.2)
BILIRUB UR QL STRIP: NEGATIVE
BUN BLD-MCNC: 10 MG/DL (ref 9–23)
BUN/CREAT SERPL: 12.5 (ref 7–25)
CALCIUM SPEC-SCNC: 9.4 MG/DL (ref 8.7–10.4)
CHLORIDE SERPL-SCNC: 107 MMOL/L (ref 99–109)
CLARITY UR: CLEAR
CO2 SERPL-SCNC: 28 MMOL/L (ref 20–31)
COLOR UR: YELLOW
CREAT BLD-MCNC: 0.8 MG/DL (ref 0.6–1.3)
DEPRECATED RDW RBC AUTO: 42.9 FL (ref 37–54)
EOSINOPHIL # BLD AUTO: 0.05 10*3/MM3 (ref 0–0.3)
EOSINOPHIL NFR BLD AUTO: 1 % (ref 0–3)
ERYTHROCYTE [DISTWIDTH] IN BLOOD BY AUTOMATED COUNT: 13.2 % (ref 11.3–14.5)
GFR SERPL CREATININE-BSD FRML MDRD: 79 ML/MIN/1.73
GLOBULIN UR ELPH-MCNC: 3.1 GM/DL
GLUCOSE BLD-MCNC: 99 MG/DL (ref 70–100)
GLUCOSE BLDC GLUCOMTR-MCNC: 86 MG/DL (ref 70–130)
GLUCOSE UR STRIP-MCNC: NEGATIVE MG/DL
HCT VFR BLD AUTO: 41.4 % (ref 34.5–44)
HGB BLD-MCNC: 13.4 G/DL (ref 11.5–15.5)
HGB UR QL STRIP.AUTO: NEGATIVE
HOLD SPECIMEN: NORMAL
HOLD SPECIMEN: NORMAL
IMM GRANULOCYTES # BLD: 0 10*3/MM3 (ref 0–0.03)
IMM GRANULOCYTES NFR BLD: 0 % (ref 0–0.6)
KETONES UR QL STRIP: NEGATIVE
LEUKOCYTE ESTERASE UR QL STRIP.AUTO: NEGATIVE
LYMPHOCYTES # BLD AUTO: 2.51 10*3/MM3 (ref 0.6–4.8)
LYMPHOCYTES NFR BLD AUTO: 50 % (ref 24–44)
MAGNESIUM SERPL-MCNC: 2 MG/DL (ref 1.3–2.7)
MCH RBC QN AUTO: 29.4 PG (ref 27–31)
MCHC RBC AUTO-ENTMCNC: 32.4 G/DL (ref 32–36)
MCV RBC AUTO: 90.8 FL (ref 80–99)
MONOCYTES # BLD AUTO: 0.4 10*3/MM3 (ref 0–1)
MONOCYTES NFR BLD AUTO: 8 % (ref 0–12)
NEUTROPHILS # BLD AUTO: 2.01 10*3/MM3 (ref 1.5–8.3)
NEUTROPHILS NFR BLD AUTO: 40 % (ref 41–71)
NITRITE UR QL STRIP: NEGATIVE
PH UR STRIP.AUTO: >=9 [PH] (ref 5–8)
PLATELET # BLD AUTO: 265 10*3/MM3 (ref 150–450)
PMV BLD AUTO: 9.1 FL (ref 6–12)
POTASSIUM BLD-SCNC: 4.1 MMOL/L (ref 3.5–5.5)
PROT SERPL-MCNC: 7.6 G/DL (ref 5.7–8.2)
PROT UR QL STRIP: NEGATIVE
RBC # BLD AUTO: 4.56 10*6/MM3 (ref 3.89–5.14)
SODIUM BLD-SCNC: 143 MMOL/L (ref 132–146)
SP GR UR STRIP: <=1.005 (ref 1–1.03)
TROPONIN I SERPL-MCNC: 0 NG/ML (ref 0–0.07)
UROBILINOGEN UR QL STRIP: ABNORMAL
WBC NRBC COR # BLD: 5.02 10*3/MM3 (ref 3.5–10.8)
WHOLE BLOOD HOLD SPECIMEN: NORMAL
WHOLE BLOOD HOLD SPECIMEN: NORMAL

## 2018-03-26 PROCEDURE — 25010000002 KETOROLAC TROMETHAMINE PER 15 MG: Performed by: EMERGENCY MEDICINE

## 2018-03-26 PROCEDURE — 99285 EMERGENCY DEPT VISIT HI MDM: CPT

## 2018-03-26 PROCEDURE — 85025 COMPLETE CBC W/AUTO DIFF WBC: CPT

## 2018-03-26 PROCEDURE — 25010000002 PROMETHAZINE PER 50 MG: Performed by: EMERGENCY MEDICINE

## 2018-03-26 PROCEDURE — 83735 ASSAY OF MAGNESIUM: CPT

## 2018-03-26 PROCEDURE — 81003 URINALYSIS AUTO W/O SCOPE: CPT

## 2018-03-26 PROCEDURE — 25010000002 METOCLOPRAMIDE PER 10 MG: Performed by: EMERGENCY MEDICINE

## 2018-03-26 PROCEDURE — 96375 TX/PRO/DX INJ NEW DRUG ADDON: CPT

## 2018-03-26 PROCEDURE — 82962 GLUCOSE BLOOD TEST: CPT

## 2018-03-26 PROCEDURE — 96374 THER/PROPH/DIAG INJ IV PUSH: CPT

## 2018-03-26 PROCEDURE — 25010000002 DEXAMETHASONE PER 1 MG: Performed by: EMERGENCY MEDICINE

## 2018-03-26 PROCEDURE — 25010000002 DIPHENHYDRAMINE PER 50 MG: Performed by: EMERGENCY MEDICINE

## 2018-03-26 PROCEDURE — 71045 X-RAY EXAM CHEST 1 VIEW: CPT

## 2018-03-26 PROCEDURE — 25010000002 LORAZEPAM PER 2 MG: Performed by: EMERGENCY MEDICINE

## 2018-03-26 PROCEDURE — 80053 COMPREHEN METABOLIC PANEL: CPT

## 2018-03-26 PROCEDURE — 93005 ELECTROCARDIOGRAM TRACING: CPT | Performed by: EMERGENCY MEDICINE

## 2018-03-26 PROCEDURE — 93005 ELECTROCARDIOGRAM TRACING: CPT

## 2018-03-26 PROCEDURE — 84484 ASSAY OF TROPONIN QUANT: CPT

## 2018-03-26 RX ORDER — DIPHENHYDRAMINE HYDROCHLORIDE 50 MG/ML
25 INJECTION INTRAMUSCULAR; INTRAVENOUS ONCE
Status: COMPLETED | OUTPATIENT
Start: 2018-03-26 | End: 2018-03-26

## 2018-03-26 RX ORDER — METOCLOPRAMIDE HYDROCHLORIDE 5 MG/ML
10 INJECTION INTRAMUSCULAR; INTRAVENOUS ONCE
Status: COMPLETED | OUTPATIENT
Start: 2018-03-26 | End: 2018-03-26

## 2018-03-26 RX ORDER — KETOROLAC TROMETHAMINE 15 MG/ML
15 INJECTION, SOLUTION INTRAMUSCULAR; INTRAVENOUS ONCE
Status: COMPLETED | OUTPATIENT
Start: 2018-03-26 | End: 2018-03-26

## 2018-03-26 RX ORDER — DEXAMETHASONE SODIUM PHOSPHATE 4 MG/ML
4 INJECTION, SOLUTION INTRA-ARTICULAR; INTRALESIONAL; INTRAMUSCULAR; INTRAVENOUS; SOFT TISSUE ONCE
Status: COMPLETED | OUTPATIENT
Start: 2018-03-26 | End: 2018-03-26

## 2018-03-26 RX ORDER — SODIUM CHLORIDE 0.9 % (FLUSH) 0.9 %
10 SYRINGE (ML) INJECTION AS NEEDED
Status: DISCONTINUED | OUTPATIENT
Start: 2018-03-26 | End: 2018-03-26 | Stop reason: HOSPADM

## 2018-03-26 RX ORDER — LORAZEPAM 2 MG/ML
0.5 INJECTION INTRAMUSCULAR ONCE
Status: COMPLETED | OUTPATIENT
Start: 2018-03-26 | End: 2018-03-26

## 2018-03-26 RX ORDER — PROMETHAZINE HYDROCHLORIDE 25 MG/ML
12.5 INJECTION, SOLUTION INTRAMUSCULAR; INTRAVENOUS ONCE
Status: COMPLETED | OUTPATIENT
Start: 2018-03-26 | End: 2018-03-26

## 2018-03-26 RX ORDER — BUTALBITAL, ACETAMINOPHEN AND CAFFEINE 50; 325; 40 MG/1; MG/1; MG/1
1 TABLET ORAL EVERY 6 HOURS PRN
Qty: 15 TABLET | Refills: 0 | Status: SHIPPED | OUTPATIENT
Start: 2018-03-26 | End: 2018-03-29

## 2018-03-26 RX ADMIN — SODIUM CHLORIDE 1000 ML: 9 INJECTION, SOLUTION INTRAVENOUS at 18:14

## 2018-03-26 RX ADMIN — SODIUM CHLORIDE 1000 ML: 9 INJECTION, SOLUTION INTRAVENOUS at 19:13

## 2018-03-26 RX ADMIN — METOCLOPRAMIDE 10 MG: 5 INJECTION, SOLUTION INTRAMUSCULAR; INTRAVENOUS at 18:15

## 2018-03-26 RX ADMIN — LORAZEPAM 0.5 MG: 2 INJECTION INTRAMUSCULAR; INTRAVENOUS at 19:10

## 2018-03-26 RX ADMIN — KETOROLAC TROMETHAMINE 15 MG: 15 INJECTION, SOLUTION INTRAMUSCULAR; INTRAVENOUS at 18:09

## 2018-03-26 RX ADMIN — DEXAMETHASONE SODIUM PHOSPHATE 4 MG: 4 INJECTION, SOLUTION INTRAMUSCULAR; INTRAVENOUS at 18:12

## 2018-03-26 RX ADMIN — DIPHENHYDRAMINE HYDROCHLORIDE 25 MG: 50 INJECTION INTRAMUSCULAR; INTRAVENOUS at 18:12

## 2018-03-26 RX ADMIN — PROMETHAZINE HYDROCHLORIDE 12.5 MG: 25 INJECTION INTRAMUSCULAR; INTRAVENOUS at 19:09

## 2018-03-29 ENCOUNTER — OFFICE VISIT (OUTPATIENT)
Dept: NEUROLOGY | Facility: CLINIC | Age: 43
End: 2018-03-29

## 2018-03-29 VITALS — WEIGHT: 143 LBS | BODY MASS INDEX: 25.34 KG/M2 | HEIGHT: 63 IN

## 2018-03-29 DIAGNOSIS — G44.89 OTHER HEADACHE SYNDROME: Primary | ICD-10-CM

## 2018-03-29 PROCEDURE — 99215 OFFICE O/P EST HI 40 MIN: CPT | Performed by: PHYSICIAN ASSISTANT

## 2018-03-29 RX ORDER — METHOTREXATE 25 MG/ML
INJECTION, SOLUTION INTRA-ARTERIAL; INTRAMUSCULAR; INTRAVENOUS
Refills: 3 | COMMUNITY
Start: 2018-03-13 | End: 2018-08-07

## 2018-03-29 RX ORDER — MEDROXYPROGESTERONE ACETATE 150 MG/ML
INJECTION, SUSPENSION INTRAMUSCULAR
COMMUNITY
Start: 2018-03-15 | End: 2018-08-07

## 2018-03-29 RX ORDER — TOPIRAMATE 25 MG/1
25 TABLET ORAL NIGHTLY
Qty: 120 TABLET | Refills: 11 | Status: SHIPPED | OUTPATIENT
Start: 2018-03-29 | End: 2019-03-29

## 2018-03-29 NOTE — PROGRESS NOTES
"Subjective     Chief Complaint: headaches     History of Present Illness   Umberto Oliva is a 43 y.o. female with a history of rheumatoid arthritiswho comes to clinic today for evaluation of headaches. She initially noted symptoms several years ago marked by a dull headache located frontally bilaterally as well as at the top of her head. This has significantly worsened since 2/18 and is now nearly daily. The pain radiates to her neck. She notes associated nausea and lightheadedness as well as light and sound sensitivity. Additionally, she notes associated spots in her vision bilaterally. There are not any clear modifying factors.     Prior evaluation has included an MRI of the brain  In 2/17 which was unremarkable . She is currently taking Imitrex and phenergan PRN, which has been somewhat beneficial.       I have reviewed and confirmed the past family, social and medical history as accurate on 3/29/18.     Review of Systems   Constitutional: Negative.    HENT: Negative.    Eyes: Negative.    Respiratory: Negative.    Cardiovascular: Negative.    Gastrointestinal: Negative.    Endocrine: Negative.    Genitourinary: Negative.    Musculoskeletal: Negative.    Skin: Negative.    Allergic/Immunologic: Negative.    Neurological:        As noted in HPI   Hematological: Negative.    Psychiatric/Behavioral: Negative.        Objective     Ht 160 cm (63\")   Wt 64.9 kg (143 lb)   BMI 25.33 kg/m²     General appearance today is normal.       Physical Exam   Neurological: She has normal strength. She has a normal Finger-Nose-Finger Test. Gait normal.   Reflex Scores:       Tricep reflexes are 2+ on the right side and 2+ on the left side.       Bicep reflexes are 2+ on the right side and 2+ on the left side.       Brachioradialis reflexes are 2+ on the right side and 2+ on the left side.       Patellar reflexes are 2+ on the right side and 2+ on the left side.  Psychiatric: Her speech is normal.        Neurologic Exam "     Mental Status   Speech: speech is normal   Level of consciousness: alert  Normal comprehension.     Cranial Nerves   Cranial nerves II through XII intact.     Motor Exam   Muscle bulk: normal  Overall muscle tone: normal    Strength   Strength 5/5 throughout.     Sensory Exam   Light touch normal.     Gait, Coordination, and Reflexes     Gait  Gait: normal    Coordination   Finger to nose coordination: normal    Tremor   Resting tremor: absent    Reflexes   Right brachioradialis: 2+  Left brachioradialis: 2+  Right biceps: 2+  Left biceps: 2+  Right triceps: 2+  Left triceps: 2+  Right patellar: 2+  Left patellar: 2+        Assessment/Plan   Umberto was seen today for migraine.    Diagnoses and all orders for this visit:    Other headache syndrome    Other orders  -     topiramate (TOPAMAX) 25 MG tablet; Take 1 tablet by mouth Every Night.          Discussion/Summary   Umberto Oliva comes to clinic today with a history suggestive of migraines. I am also concerned that her symptoms could be related to analgesic overuse. This was discussed in detail with the patient. After discussing potential treatment options, it was elected to add topiramate, slowly titrating up to 100mg nightly. I also recommended that she discontinue analgesic use for at least several weeks (if first cleared by her rheumatologist concerning her Celebrex). She will then follow up in 6 weeks, or sooner if needed.       I spent 30 minutes out of 40 minutes face to face with the patient and discussing diagnosis, prognosis, diagnostic testing, evaluation, current status, treatment options and management.    As part of this visit I reviewed radiology results.      Sulema Calles PA-C

## 2018-04-13 ENCOUNTER — OFFICE VISIT (OUTPATIENT)
Dept: INTERNAL MEDICINE | Facility: CLINIC | Age: 43
End: 2018-04-13

## 2018-04-13 VITALS
HEIGHT: 63 IN | RESPIRATION RATE: 14 BRPM | DIASTOLIC BLOOD PRESSURE: 74 MMHG | OXYGEN SATURATION: 99 % | WEIGHT: 143.13 LBS | SYSTOLIC BLOOD PRESSURE: 112 MMHG | TEMPERATURE: 98.5 F | HEART RATE: 88 BPM | BODY MASS INDEX: 25.36 KG/M2

## 2018-04-13 DIAGNOSIS — R51.9 INTRACTABLE HEADACHE, UNSPECIFIED CHRONICITY PATTERN, UNSPECIFIED HEADACHE TYPE: Primary | ICD-10-CM

## 2018-04-13 PROCEDURE — 99213 OFFICE O/P EST LOW 20 MIN: CPT | Performed by: NURSE PRACTITIONER

## 2018-04-13 RX ORDER — BACLOFEN 20 MG
1 TABLET ORAL DAILY
Qty: 30 TABLET | Refills: 0 | Status: SHIPPED | OUTPATIENT
Start: 2018-04-13 | End: 2018-08-07

## 2018-04-13 NOTE — PROGRESS NOTES
Chief Complaint / Reason:      Chief Complaint   Patient presents with   • Headache     f/u-1 mo.       Subjective     HPI  She presents today for one-month follow-up regarding headache.  She has seen a physician assistant with neurologist on 3/29/18 and the added topiramate, slowly titrating up to 100mg nightly was advised and patient is still taking 25 mg.  She states she still has headache but it is much better than before.  I had previously recommended that she have an eye exam and she has not had one yet.  She was strongly encouraged to get one done prior to next visit.  History taken from: patient    PMH/FH/Social History were reviewed and updated appropriately in the electronic medical record.     Review of Systems:   Review of Systems   Constitutional: Positive for fatigue.   Respiratory: Negative.    Cardiovascular: Negative.    Gastrointestinal: Positive for nausea.   Neurological: Positive for dizziness, weakness and headaches.     All other systems were reviewed and are negative.  Exceptions are noted in the subjective or above.      Objective     Vital Signs  Vitals:    04/13/18 1537   BP: 112/74   Pulse: 88   Resp: 14   Temp: 98.5 °F (36.9 °C)   SpO2: 99%       Body mass index is 25.35 kg/m².    Physical Exam   Constitutional: She is oriented to person, place, and time. She appears well-developed and well-nourished. No distress.   Cardiovascular: Normal rate, regular rhythm, normal heart sounds and intact distal pulses.    Pulmonary/Chest: Effort normal and breath sounds normal. She has no wheezes. She exhibits no tenderness.   Neurological: She is alert and oriented to person, place, and time.   Skin: Skin is warm and dry. No rash noted. No erythema. No pallor.   Psychiatric: She has a normal mood and affect. Her behavior is normal. Judgment and thought content normal.   Nursing note and vitals reviewed.       Results Review:    I reviewed the patient's previous clinical results.       Medication  Review:     Current Outpatient Prescriptions:   •  Cyanocobalamin (B-12) 1000 MCG/ML kit, Inject  as directed., Disp: , Rfl:   •  ENBREL SURECLICK 50 MG/ML solution auto-injector, , Disp: , Rfl:   •  estradiol (ESTRACE) 0.5 MG tablet, Take 1 tablet by mouth Daily., Disp: , Rfl:   •  folic acid (FOLVITE) 1 MG tablet, Take 1 mg by mouth Daily., Disp: , Rfl:   •  Methotrexate Sodium 50 MG/2ML injection, inject 1 ml intramuscularly once weekly as directed, Disp: , Rfl: 3  •  Methotrexate, PF, 15 MG/0.3ML solution auto-injector, Inject  under the skin 1 (One) Time Per Week., Disp: , Rfl:   •  omeprazole (priLOSEC) 20 MG capsule, TAKE 1 CAPSULE BY MOUTH IN THE MORNING AS NEEDED for gastritis, Disp: , Rfl: 5  •  promethazine (PHENERGAN) 25 MG tablet, Take 1 tablet by mouth Every 6 (Six) Hours As Needed for Nausea or Vomiting., Disp: 20 tablet, Rfl: 0  •  SUMAtriptan (IMITREX) 100 MG tablet, Take 1 tablet by mouth 1 (One) Time As Needed for Migraine (may repeat once in 2 hours if needed) for up to 1 dose., Disp: 9 tablet, Rfl: 5  •  topiramate (TOPAMAX) 25 MG tablet, Take 1 tablet by mouth Every Night., Disp: 120 tablet, Rfl: 11  •  Magnesium Oxide 500 MG tablet, Take 1 tablet by mouth Daily., Disp: 30 tablet, Rfl: 0    Assessment/Plan   Umberto was seen today for headache.    Diagnoses and all orders for this visit:    Intractable headache, unspecified chronicity pattern, unspecified headache type  -     Magnesium Oxide 500 MG tablet; Take 1 tablet by mouth Daily.    Recommend patient continue medications as prescribed from neurology and recommend she maintain hydration and stay active along with keeping a log of headaches and worsening symptoms.  Get eye exam.   Return in about 4 weeks (around 5/11/2018), or if symptoms worsen or fail to improve.    Kym Charles, APRN  04/13/2018

## 2018-04-24 ENCOUNTER — OFFICE VISIT (OUTPATIENT)
Dept: OBSTETRICS AND GYNECOLOGY | Facility: CLINIC | Age: 43
End: 2018-04-24

## 2018-04-24 VITALS
SYSTOLIC BLOOD PRESSURE: 114 MMHG | DIASTOLIC BLOOD PRESSURE: 70 MMHG | WEIGHT: 143 LBS | HEIGHT: 62 IN | BODY MASS INDEX: 26.31 KG/M2

## 2018-04-24 DIAGNOSIS — N95.1 MENOPAUSAL SYMPTOMS: Primary | ICD-10-CM

## 2018-04-24 DIAGNOSIS — Z12.39 ENCOUNTER FOR OTHER SCREENING FOR MALIGNANT NEOPLASM OF BREAST: ICD-10-CM

## 2018-04-24 DIAGNOSIS — R10.32 LLQ ABDOMINAL PAIN: ICD-10-CM

## 2018-04-24 PROCEDURE — 99213 OFFICE O/P EST LOW 20 MIN: CPT | Performed by: OBSTETRICS & GYNECOLOGY

## 2018-04-24 RX ORDER — TOPIRAMATE 50 MG/1
TABLET, FILM COATED ORAL
COMMUNITY
Start: 2018-04-20 | End: 2018-04-24

## 2018-04-24 RX ORDER — ESTRADIOL 0.05 MG/D
1 FILM, EXTENDED RELEASE TRANSDERMAL WEEKLY
Qty: 8 PATCH | Refills: 12 | Status: SHIPPED | OUTPATIENT
Start: 2018-04-24 | End: 2018-04-24 | Stop reason: ALTCHOICE

## 2018-04-24 RX ORDER — ESTRADIOL 0.07 MG/D
1 FILM, EXTENDED RELEASE TRANSDERMAL 2 TIMES WEEKLY
Qty: 8 PATCH | Refills: 12 | Status: SHIPPED | OUTPATIENT
Start: 2018-04-26 | End: 2019-02-26

## 2018-04-24 NOTE — PROGRESS NOTES
Subjective   Chief Complaint   Patient presents with   • Headache     night sweats, hot flashes, dry mouth     Umberto Oliva is a 43 y.o. year old .  No LMP recorded. Patient has had a hysterectomy.  She presents to be seen because of Concern that her migraine headaches are related to her estrogen.  She is currently on estradiol 0.5 mg.  She been on this for a couple of years.  Did not notice any increase in her migraines initially.  Over the last 3-4 months she's had an increase in migraine frequency.  She reported that she almost had 3 or 4 weeks where she had a constant headache with migraine exacerbations and really couldn't get out of bed.  Seen in emergency room once.  Saw a PA for neurology used decreased her dose of Topamax from 50-25 mg daily.  The increase in migraines has been associated with some increase in her room for arthritis maybe a flare and/or change in medications?  I discussed with Demond that estrogen is usually implicated in migraines with eyedrop right before her menstrual cycle.  The continuous estrogen and she is on should not be a problem unless she misses a few pills and her estrogen level with drop.  She doesn't remember missing too many pills or taking them to light on regular basis or there being any association with her migraine frequency.  She is having some hot flashes and night sweats as well.  I think if her insurance would cover it 8 patch would be ideal as this would give a more steady state 24 7 release of estrogen.  She said when she had a higher dose that she had some side effects from this so we'll go to the midpoint of 0.075 mg Minivelle.  Her left lower quadrant pain is only occasional.  She had a history of extensive bowel adhesions noted at hysterectomy.                    The following portions of the patient's history were reviewed and updated as appropriate:current medications and allergies    Review of Systems fairly normal bowels and bladder     Objective   BP  "114/70   Ht 156.8 cm (61.75\")   Wt 64.9 kg (143 lb)   BMI 26.37 kg/m²     General:  well developed; well nourished  no acute distress  appears stated age   Skin:  No suspicious lesions seen   Thyroid: not examined   Lungs:  breathing is unlabored   Heart:  Not performed.   Abdomen: soft, non-tender; no masses  no umbilical or inginual hernias are present  no hepato-splenomegaly   Midline vertical scar    Pelvis: Not performed.     Lab Review   No data reviewed    Imaging   No data reviewed       Assessment   1. Postmenopausal symptoms on oral estrogen replacement therapy  2. Migraine headaches I don't think these are related to estrogen as she's been on this for 2 years in any event we could try 24 7 release with a patch and see if this is helpful  3. Mammograms breast exam discussed.  Only breast cancers and aunts reviewed options and mammograms now or waiting to 47 she likely wouldn't order one now.       Plan   1. Order mammogram self breast awareness discussed  2. Annual sooner should she have any problems    Medications Rx this encounter:  New Medications Ordered This Visit   Medications   • MINIVELLE 0.075 MG/24HR patch     Sig: Place 1 patch on the skin 2 (Two) Times a Week.     Dispense:  8 patch     Refill:  12          This note was electronically signed.    Vinayak Petersen MD  April 24, 2018    "

## 2018-06-01 ENCOUNTER — TELEPHONE (OUTPATIENT)
Dept: OBSTETRICS AND GYNECOLOGY | Facility: CLINIC | Age: 43
End: 2018-06-01

## 2018-06-01 NOTE — TELEPHONE ENCOUNTER
Started swelling in feet and right ankle 2 weeks after starting minivelle. Some hand swelling too.

## 2018-06-04 ENCOUNTER — OFFICE VISIT (OUTPATIENT)
Dept: INTERNAL MEDICINE | Facility: CLINIC | Age: 43
End: 2018-06-04

## 2018-06-04 VITALS
SYSTOLIC BLOOD PRESSURE: 108 MMHG | DIASTOLIC BLOOD PRESSURE: 68 MMHG | HEIGHT: 62 IN | HEART RATE: 95 BPM | BODY MASS INDEX: 26.87 KG/M2 | OXYGEN SATURATION: 99 % | WEIGHT: 146 LBS | TEMPERATURE: 98.1 F

## 2018-06-04 DIAGNOSIS — R60.1 GENERALIZED EDEMA: ICD-10-CM

## 2018-06-04 DIAGNOSIS — R30.0 DYSURIA: ICD-10-CM

## 2018-06-04 DIAGNOSIS — R10.9 LEFT FLANK PAIN: Primary | ICD-10-CM

## 2018-06-04 DIAGNOSIS — L53.9 ERYTHEMA OF OROPHARYNX: ICD-10-CM

## 2018-06-04 PROBLEM — R05.8 NON-PRODUCTIVE COUGH: Status: RESOLVED | Noted: 2017-04-03 | Resolved: 2018-06-04

## 2018-06-04 PROBLEM — J02.0 STREP PHARYNGITIS: Status: RESOLVED | Noted: 2018-02-15 | Resolved: 2018-06-04

## 2018-06-04 LAB
BILIRUB BLD-MCNC: NEGATIVE MG/DL
CLARITY, POC: CLEAR
COLOR UR: YELLOW
EXPIRATION DATE: NORMAL
GLUCOSE UR STRIP-MCNC: NEGATIVE MG/DL
INTERNAL CONTROL: NORMAL
KETONES UR QL: NEGATIVE
LEUKOCYTE EST, POC: NEGATIVE
Lab: NORMAL
NITRITE UR-MCNC: NEGATIVE MG/ML
PH UR: 8 [PH] (ref 5–8)
PROT UR STRIP-MCNC: NEGATIVE MG/DL
RBC # UR STRIP: NEGATIVE /UL
S PYO AG THROAT QL: NEGATIVE
SP GR UR: 1.01 (ref 1–1.03)
UROBILINOGEN UR QL: NORMAL

## 2018-06-04 PROCEDURE — 87880 STREP A ASSAY W/OPTIC: CPT | Performed by: PHYSICIAN ASSISTANT

## 2018-06-04 PROCEDURE — 81003 URINALYSIS AUTO W/O SCOPE: CPT | Performed by: PHYSICIAN ASSISTANT

## 2018-06-04 PROCEDURE — 99213 OFFICE O/P EST LOW 20 MIN: CPT | Performed by: PHYSICIAN ASSISTANT

## 2018-06-04 NOTE — PROGRESS NOTES
Umberto Oliva is a 43 y.o. female.     Subjective   History of Present Illness   Here today with concern of a little dysuria for the last 2 days, but not severe.  Occasional left flank pains which are sharp within the last few days.  Has noticed swelling in the hands and feet for the last week.  Has a history of kidney stones. No hematuria, fever, chills, frequency or urgency.         The following portions of the patient's history were reviewed and updated as appropriate: allergies, current medications, past family history, past medical history, past social history, past surgical history and problem list.    Review of Systems    Constitutional: Negative for appetite change, chills, fatigue, fever and unexpected weight change.   HENT: Negative for congestion, ear pain, hearing loss, nosebleeds, postnasal drip, rhinorrhea, sore throat, tinnitus and trouble swallowing.    Eyes: Negative for photophobia, discharge and visual disturbance.   Respiratory: Negative for cough, chest tightness, shortness of breath and wheezing.    Cardiovascular: swelling feet and hands.  Negative for chest pain, palpitations.  Gastrointestinal: Negative for abdominal distention, abdominal pain, blood in stool, constipation, diarrhea, nausea and vomiting.   Endocrine: Negative for cold intolerance, heat intolerance, polydipsia, polyphagia and polyuria.   Musculoskeletal: left flank pain.  Negative for arthralgias, joint swelling, myalgias, neck pain and neck stiffness.   Skin: Negative for color change, pallor, rash and wound.   Allergic/Immunologic: Negative for environmental allergies, food allergies and immunocompromised state.   Neurological: Negative for dizziness, tremors, seizures, weakness, numbness and headaches.   Hematological: Negative for adenopathy. Does not bruise/bleed easily.   Psychiatric/Behavioral: Negative for sleep disturbances, agitation, behavioral problems, confusion, hallucinations, self-injury and suicidal ideas.  "The patient is not nervous/anxious.    : dysuria. No frequency, urgency or hematuria.     Objective    Physical Exam  Constitutional: Oriented to person, place, and time. Appears well-developed and well-nourished.   HENT: OP erythema.   Head: Normocephalic and atraumatic.   Eyes: EOM are normal. Pupils are equal, round, and reactive to light.   Neck: Normal range of motion. Neck supple.   Cardiovascular: trace pedal edema. Normal rate, regular rhythm and normal heart sounds.    Pulmonary/Chest: Effort normal and breath sounds normal. No respiratory distress.  Has no wheezes or rales. Exhibits no chest wall tenderness.   Abdominal: Soft. Bowel sounds are normal. Exhibits no distension and no mass. There is no tenderness.   Musculoskeletal: mild left CVA tenderness. Normal range of motion.   Neurological: Alert and oriented to person, place, and time.   Skin: Skin is warm and dry.   Psychiatric: Has a normal mood and affect. Behavior is normal. Judgment and thought content normal.       /68   Pulse 95   Temp 98.1 °F (36.7 °C)   Ht 156.8 cm (61.73\")   Wt 66.2 kg (146 lb)   SpO2 99%   BMI 26.94 kg/m²     Nursing note and vitals reviewed.        Assessment/Plan   Umberto was seen today for urinary problem and edema.    Diagnoses and all orders for this visit:    Left flank pain  -     Urine Culture - Urine, Urine, Clean Catch  -     POCT urinalysis dipstick, automated - normal    Dysuria  -     Urine Culture - Urine, Urine, Clean Catch  -     POCT urinalysis dipstick, automated - normal    Generalized edema  -     Basic Metabolic Panel    Erythema of oropharynx  -     POC Rapid Strep A - negative      Will evaluate renal function due to edema.            "

## 2018-06-06 LAB
BACTERIA UR CULT: NORMAL
BACTERIA UR CULT: NORMAL
BUN SERPL-MCNC: 11 MG/DL (ref 7–20)
BUN/CREAT SERPL: 15.7 (ref 7.1–23.5)
CALCIUM SERPL-MCNC: 9.7 MG/DL (ref 8.4–10.2)
CHLORIDE SERPL-SCNC: 104 MMOL/L (ref 98–107)
CO2 SERPL-SCNC: 29 MMOL/L (ref 26–30)
CREAT SERPL-MCNC: 0.7 MG/DL (ref 0.6–1.3)
GFR SERPLBLD CREATININE-BSD FMLA CKD-EPI: 112 ML/MIN/1.73
GFR SERPLBLD CREATININE-BSD FMLA CKD-EPI: 92 ML/MIN/1.73
GLUCOSE SERPL-MCNC: 103 MG/DL (ref 74–98)
POTASSIUM SERPL-SCNC: 4.7 MMOL/L (ref 3.5–5.1)
SODIUM SERPL-SCNC: 143 MMOL/L (ref 137–145)

## 2018-08-07 ENCOUNTER — OFFICE VISIT (OUTPATIENT)
Dept: INTERNAL MEDICINE | Facility: CLINIC | Age: 43
End: 2018-08-07

## 2018-08-07 VITALS
BODY MASS INDEX: 26.68 KG/M2 | HEART RATE: 110 BPM | HEIGHT: 62 IN | WEIGHT: 145 LBS | SYSTOLIC BLOOD PRESSURE: 108 MMHG | OXYGEN SATURATION: 99 % | TEMPERATURE: 98.5 F | DIASTOLIC BLOOD PRESSURE: 78 MMHG

## 2018-08-07 DIAGNOSIS — E55.9 VITAMIN D DEFICIENCY: ICD-10-CM

## 2018-08-07 DIAGNOSIS — M65.9 SYNOVITIS OF RIGHT ANKLE: ICD-10-CM

## 2018-08-07 DIAGNOSIS — M06.4 INFLAMMATORY POLYARTHRITIS (HCC): Primary | ICD-10-CM

## 2018-08-07 DIAGNOSIS — G62.9 POLYNEUROPATHY: ICD-10-CM

## 2018-08-07 PROCEDURE — 99214 OFFICE O/P EST MOD 30 MIN: CPT | Performed by: PHYSICIAN ASSISTANT

## 2018-08-07 RX ORDER — IBUPROFEN 800 MG/1
800 TABLET ORAL AS NEEDED
Refills: 5 | COMMUNITY
Start: 2018-07-03 | End: 2020-07-27

## 2018-08-07 RX ORDER — PREDNISONE 1 MG/1
TABLET ORAL
Refills: 0 | COMMUNITY
Start: 2018-07-03 | End: 2019-02-26 | Stop reason: SDUPTHER

## 2018-08-07 NOTE — PROGRESS NOTES
Umberto Oliva is a 43 y.o. female.     Subjective   History of Present Illness   Here today with concern of intermittent tingling and burning in the hands and feet for the last 3-4 weeks but has been worse for the last few days. No swelling in hands but has had swelling in the right lower leg which is constant and little in the left lower leg. She is currently on 5 mg prednisone twice daily and 800 mg ibuprofen 1-2 times per day.  Right ankle pain has been worse for the last week to the point of crying herself to sleep and very disturbed sleep.     She was given a sample of Xeljanz or RA which she has not started yet due to needing to be vaccinated for shingles prior to starting.         The following portions of the patient's history were reviewed and updated as appropriate: allergies, current medications, past family history, past medical history, past social history, past surgical history and problem list.    Review of Systems    Constitutional: Negative for appetite change, chills, fatigue, fever and unexpected weight change.   HENT: Negative for congestion, ear pain, hearing loss, nosebleeds, postnasal drip, rhinorrhea, sore throat, tinnitus and trouble swallowing.    Eyes: Negative for photophobia, discharge and visual disturbance.   Respiratory: Negative for cough, chest tightness, shortness of breath and wheezing.    Cardiovascular: Negative for chest pain, palpitations and leg swelling.   Gastrointestinal: Negative for abdominal distention, abdominal pain, blood in stool, constipation, diarrhea, nausea and vomiting.   Endocrine: Negative for cold intolerance, heat intolerance, polydipsia, polyphagia and polyuria.   Musculoskeletal: arthralgias,  joint swelling.  Negative for back pain, myalgias, neck pain and neck stiffness.   Skin: Negative for color change, pallor, rash and wound.   Allergic/Immunologic: Negative for environmental allergies, food allergies and immunocompromised state.   Neurological:  "numbness and burning.  Negative for dizziness, tremors, seizures, weakness and headaches.   Hematological: Negative for adenopathy. Does not bruise/bleed easily.   Psychiatric/Behavioral: sleep disturbances.  Negative for agitation, behavioral problems, confusion, hallucinations, self-injury and suicidal ideas. The patient is not nervous/anxious.      Objective    Physical Exam  Constitutional: Apparently in pain.  Appears well-developed and well-nourished.   HENT: OP normal.   Head: Normocephalic and atraumatic.   Eyes: EOM are normal. Pupils are equal, round, and reactive to light.   Neck: Normal range of motion. Neck supple.   Cardiovascular: 1+ pitting edema right lower leg and trace pitting edema left lower leg.   Normal rate, regular rhythm and normal heart sounds.    Pulmonary/Chest: Effort normal and breath sounds normal. No respiratory distress.  Has no wheezes or rales. Exhibits no chest wall tenderness.   Abdominal: Soft. Bowel sounds are normal. Exhibits no distension and no mass. There is no tenderness.   Musculoskeletal: tenderness of hands and feet.  Normal range of motion.   Neurological: Alert and oriented to person, place, and time.   Skin: dusky appearance of right medial ankle. Skin is warm and dry.   Psychiatric: Has a normal mood and affect. Behavior is normal. Judgment and thought content normal.       /78   Pulse 110   Temp 98.5 °F (36.9 °C)   Ht 156.8 cm (61.73\")   Wt 65.8 kg (145 lb)   SpO2 99%   BMI 26.75 kg/m²     Nursing note and vitals reviewed.        Assessment/Plan   Umberto was seen today for nerve pain, swelling of feet and hands, tingling.    Diagnoses and all orders for this visit:    Inflammatory polyarthritis (CMS/HCC)  -     CBC (No Diff)  -     Comprehensive Metabolic Panel  -     Methylmalonic Acid, Serum  -     Rheumatoid Arthritis Expanded Panel  -     Ambulatory Referral to Rheumatology    Polyneuropathy  -     CBC (No Diff)  -     Comprehensive Metabolic " Panel  -     Methylmalonic Acid, Serum  -     Rheumatoid Arthritis Expanded Panel  -     Ambulatory Referral to Rheumatology    Synovitis of right ankle  -     Ambulatory Referral to Rheumatology    Vitamin D deficiency  -     Vitamin D 25 Hydroxy      Refer to new rheumatologist for second opinion.

## 2018-08-09 LAB
25(OH)D3+25(OH)D2 SERPL-MCNC: 25.2 NG/ML
ALBUMIN SERPL-MCNC: 4.2 G/DL (ref 3.5–5)
ALBUMIN/GLOB SERPL: 1.3 G/DL (ref 1–2)
ALP SERPL-CCNC: 102 U/L (ref 38–126)
ALT SERPL-CCNC: 24 U/L (ref 13–69)
AST SERPL-CCNC: 25 U/L (ref 15–46)
BILIRUB SERPL-MCNC: 0.5 MG/DL (ref 0.2–1.3)
BUN SERPL-MCNC: 15 MG/DL (ref 7–20)
BUN/CREAT SERPL: 25 (ref 7.1–23.5)
CALCIUM SERPL-MCNC: 9.4 MG/DL (ref 8.4–10.2)
CCP IGA+IGG SERPL IA-ACNC: 5 UNITS (ref 0–19)
CHLORIDE SERPL-SCNC: 105 MMOL/L (ref 98–107)
CO2 SERPL-SCNC: 23 MMOL/L (ref 26–30)
CREAT SERPL-MCNC: 0.6 MG/DL (ref 0.6–1.3)
CRP SERPL-MCNC: 7.1 MG/L (ref 0–4.9)
ERYTHROCYTE [DISTWIDTH] IN BLOOD BY AUTOMATED COUNT: 12.6 % (ref 11.5–14.5)
ERYTHROCYTE [SEDIMENTATION RATE] IN BLOOD BY WESTERGREN METHOD: 11 MM/HR (ref 0–32)
GLOBULIN SER CALC-MCNC: 3.2 GM/DL
GLUCOSE SERPL-MCNC: 81 MG/DL (ref 74–98)
HCT VFR BLD AUTO: 38 % (ref 37–47)
HGB BLD-MCNC: 12.4 G/DL (ref 12–16)
Lab: NORMAL
MCH RBC QN AUTO: 29 PG (ref 27–31)
MCHC RBC AUTO-ENTMCNC: 32.6 G/DL (ref 30–37)
MCV RBC AUTO: 88.8 FL (ref 81–99)
METHYLMALONATE SERPL-SCNC: 73 NMOL/L (ref 0–378)
PLATELET # BLD AUTO: 360 10*3/MM3 (ref 130–400)
POTASSIUM SERPL-SCNC: 4.2 MMOL/L (ref 3.5–5.1)
PROT SERPL-MCNC: 7.4 G/DL (ref 6.3–8.2)
RBC # BLD AUTO: 4.28 10*6/MM3 (ref 4.2–5.4)
RHEUMATOID FACT SERPL-ACNC: 13 IU/ML (ref 0–13.9)
SODIUM SERPL-SCNC: 140 MMOL/L (ref 137–145)
WBC # BLD AUTO: 9.1 10*3/MM3 (ref 4.8–10.8)

## 2019-02-04 ENCOUNTER — TELEPHONE (OUTPATIENT)
Dept: OBSTETRICS AND GYNECOLOGY | Facility: CLINIC | Age: 44
End: 2019-02-04

## 2019-02-04 RX ORDER — ESTRADIOL 0.07 MG/D
1 FILM, EXTENDED RELEASE TRANSDERMAL 2 TIMES WEEKLY
Qty: 8 PATCH | Refills: 4 | Status: SHIPPED | OUTPATIENT
Start: 2019-02-04 | End: 2019-05-06

## 2019-02-26 ENCOUNTER — OFFICE VISIT (OUTPATIENT)
Dept: INTERNAL MEDICINE | Facility: CLINIC | Age: 44
End: 2019-02-26

## 2019-02-26 VITALS
SYSTOLIC BLOOD PRESSURE: 110 MMHG | WEIGHT: 149 LBS | HEIGHT: 62 IN | BODY MASS INDEX: 27.42 KG/M2 | OXYGEN SATURATION: 99 % | HEART RATE: 82 BPM | TEMPERATURE: 98.9 F | DIASTOLIC BLOOD PRESSURE: 72 MMHG

## 2019-02-26 DIAGNOSIS — R11.0 NAUSEA: ICD-10-CM

## 2019-02-26 DIAGNOSIS — J06.9 ACUTE URI: Primary | ICD-10-CM

## 2019-02-26 LAB
EXPIRATION DATE: NORMAL
FLUAV AG NPH QL: NEGATIVE
FLUBV AG NPH QL: NEGATIVE
INTERNAL CONTROL: NORMAL
Lab: NORMAL

## 2019-02-26 PROCEDURE — 87804 INFLUENZA ASSAY W/OPTIC: CPT | Performed by: PHYSICIAN ASSISTANT

## 2019-02-26 PROCEDURE — 99213 OFFICE O/P EST LOW 20 MIN: CPT | Performed by: PHYSICIAN ASSISTANT

## 2019-02-26 RX ORDER — PREDNISONE 1 MG/1
10 TABLET ORAL DAILY PRN
Qty: 30 TABLET | Refills: 1 | Status: SHIPPED | OUTPATIENT
Start: 2019-02-26 | End: 2019-12-23

## 2019-02-26 RX ORDER — HEPATITIS A VACCINE 1440 [IU]/ML
INJECTION, SUSPENSION INTRAMUSCULAR
Refills: 0 | COMMUNITY
Start: 2018-12-10 | End: 2019-02-26

## 2019-02-26 RX ORDER — BROMPHENIRAMINE MALEATE, PSEUDOEPHEDRINE HYDROCHLORIDE, AND DEXTROMETHORPHAN HYDROBROMIDE 2; 30; 10 MG/5ML; MG/5ML; MG/5ML
10 SYRUP ORAL 4 TIMES DAILY PRN
Qty: 118 ML | Refills: 0 | Status: SHIPPED | OUTPATIENT
Start: 2019-02-26 | End: 2019-05-06

## 2019-02-26 RX ORDER — APREMILAST 30 MG/1
TABLET, FILM COATED ORAL
COMMUNITY
Start: 2019-02-22 | End: 2019-09-24

## 2019-02-26 RX ORDER — PROMETHAZINE HYDROCHLORIDE 25 MG/1
25 TABLET ORAL EVERY 6 HOURS PRN
Qty: 20 TABLET | Refills: 0 | Status: SHIPPED | OUTPATIENT
Start: 2019-02-26 | End: 2019-11-13

## 2019-02-26 RX ORDER — SUMATRIPTAN 100 MG/1
100 TABLET, FILM COATED ORAL ONCE AS NEEDED
Qty: 9 TABLET | Refills: 5 | Status: SHIPPED | OUTPATIENT
Start: 2019-02-26 | End: 2020-11-06 | Stop reason: SDUPTHER

## 2019-02-26 NOTE — PROGRESS NOTES
Umberto Oliva is a 44 y.o. female.     Subjective   History of Present Illness   Here today with concern of cough, chills, nausea, headache and body aches.  Body aches, postnasal drip and rhinorrhea began around 4 days ago then yesterday she developed nausea. No sore throat, diarrhea, vomiting or abdominal pain.  She has been taking Bromfed at bedtime which helps some.         The following portions of the patient's history were reviewed and updated as appropriate: allergies, current medications, past family history, past medical history, past social history, past surgical history and problem list.    Review of Systems   Constitutional: Positive for chills and fatigue. Negative for appetite change, fever and unexpected weight change.   HENT: Positive for congestion, postnasal drip and rhinorrhea. Negative for ear pain, sinus pressure, sinus pain and sore throat.    Respiratory: Positive for cough. Negative for chest tightness, shortness of breath and wheezing.    Cardiovascular: Negative for chest pain, palpitations and leg swelling.   Gastrointestinal: Positive for nausea. Negative for abdominal pain, blood in stool, diarrhea and vomiting.   Musculoskeletal: Positive for arthralgias. Negative for neck pain and neck stiffness.   Skin: Negative for rash.   Neurological: Positive for headaches. Negative for weakness.   Hematological: Negative for adenopathy. Does not bruise/bleed easily.   Psychiatric/Behavioral: Negative for agitation, behavioral problems, dysphoric mood, self-injury and suicidal ideas. The patient is not nervous/anxious.          Objective    Physical Exam   Constitutional: She is oriented to person, place, and time. She appears well-developed and well-nourished. No distress.   HENT:   Head: Normocephalic and atraumatic.   Right Ear: External ear normal.   Left Ear: External ear normal.   Nose: Nose normal.   Mouth/Throat: No oropharyngeal exudate.   Mild clear postnasal drip.    Eyes: Conjunctivae  "and EOM are normal. Pupils are equal, round, and reactive to light. No scleral icterus.   Neck: Normal range of motion. Neck supple. No thyromegaly present.   Cardiovascular: Normal rate, regular rhythm and normal heart sounds. Exam reveals no gallop and no friction rub.   No murmur heard.  Pulmonary/Chest: Effort normal and breath sounds normal. No respiratory distress. She has no wheezes. She has no rales. She exhibits no tenderness.   Abdominal: Soft. Bowel sounds are normal. She exhibits no distension and no mass. There is no tenderness. There is no rebound and no guarding. No hernia.   Musculoskeletal: Normal range of motion. She exhibits no tenderness.   Lymphadenopathy:     She has cervical adenopathy (mild posterior).   Neurological: She is alert and oriented to person, place, and time. No cranial nerve deficit or sensory deficit.   Skin: Skin is warm and dry. Capillary refill takes less than 2 seconds. No rash noted. She is not diaphoretic.   Psychiatric: She has a normal mood and affect. Her behavior is normal. Judgment and thought content normal.         /72   Pulse 82   Temp 98.9 °F (37.2 °C)   Ht 156.8 cm (61.73\")   Wt 67.6 kg (149 lb)   SpO2 99%   BMI 27.49 kg/m²     Nursing note and vitals reviewed.          Assessment/Plan   Umberto was seen today for flu symptoms.    Diagnoses and all orders for this visit:    Acute URI  -     brompheniramine-pseudoephedrine-DM 30-2-10 MG/5ML syrup; Take 10 mL by mouth 4 (Four) Times a Day As Needed for Congestion, Cough or Allergies.        -     POCT influenza A/B - negative    Nausea  -     promethazine (PHENERGAN) 25 MG tablet; Take 1 tablet by mouth Every 6 (Six) Hours As Needed for Nausea or Vomiting.      Increase PO fluid intake. Ibuprofen and/or Tylenol prn for pain and fever control.       Call or RTC if symptoms worsen or persist.                "

## 2019-04-25 ENCOUNTER — TRANSCRIBE ORDERS (OUTPATIENT)
Dept: GENERAL RADIOLOGY | Facility: HOSPITAL | Age: 44
End: 2019-04-25

## 2019-04-25 ENCOUNTER — HOSPITAL ENCOUNTER (OUTPATIENT)
Dept: GENERAL RADIOLOGY | Facility: HOSPITAL | Age: 44
Discharge: HOME | End: 2019-04-25

## 2019-04-25 ENCOUNTER — HOSPITAL ENCOUNTER (OUTPATIENT)
Dept: GENERAL RADIOLOGY | Facility: HOSPITAL | Age: 44
Discharge: HOME | End: 2019-04-25
Admitting: INTERNAL MEDICINE

## 2019-04-25 DIAGNOSIS — R53.82 CHRONIC FATIGUE, UNSPECIFIED: ICD-10-CM

## 2019-04-25 DIAGNOSIS — M06.4 INFLAMMATORY POLYARTHROPATHY (HCC): ICD-10-CM

## 2019-04-25 DIAGNOSIS — L40.50 ARTHROPATHIC PSORIASIS, UNSPECIFIED (HCC): Primary | ICD-10-CM

## 2019-04-25 DIAGNOSIS — L40.50 ARTHROPATHIC PSORIASIS, UNSPECIFIED (HCC): ICD-10-CM

## 2019-04-25 PROCEDURE — 73620 X-RAY EXAM OF FOOT: CPT

## 2019-04-25 PROCEDURE — 73120 X-RAY EXAM OF HAND: CPT

## 2019-05-06 ENCOUNTER — OFFICE VISIT (OUTPATIENT)
Dept: OBSTETRICS AND GYNECOLOGY | Facility: CLINIC | Age: 44
End: 2019-05-06

## 2019-05-06 VITALS
WEIGHT: 139 LBS | BODY MASS INDEX: 26.24 KG/M2 | HEIGHT: 61 IN | SYSTOLIC BLOOD PRESSURE: 116 MMHG | DIASTOLIC BLOOD PRESSURE: 70 MMHG

## 2019-05-06 DIAGNOSIS — N95.1 MENOPAUSAL SYMPTOMS: ICD-10-CM

## 2019-05-06 DIAGNOSIS — Z01.419 ENCOUNTER FOR GYNECOLOGICAL EXAMINATION WITHOUT ABNORMAL FINDING: Primary | ICD-10-CM

## 2019-05-06 PROCEDURE — 99396 PREV VISIT EST AGE 40-64: CPT | Performed by: OBSTETRICS & GYNECOLOGY

## 2019-05-06 NOTE — PROGRESS NOTES
"DataSubjective   Chief Complaint   Patient presents with   • Annual Exam     Umberto Oliva is a 44 y.o. year old  who is post-menopausal.  She is S/P hysterectomy presenting to be seen for her annual exam.  This past year she has been on hormone replacement therapy.  There has not been vaginal bleeding in the last 12 months.  Menopausal symptoms are not present.  She wonders about the cost of her patches.  The Minivelle Vivelle were $75 per month.  The generic is about $50 a month.  I checked in it appears that even this may be preferred so we will see if this causes less.  The patches are effective in controlling her hot flashes and night sweats.  Briefly discussed mammogram protocols and the high false positive risk for screening beginning in age 40 as opposed to 50 there is no family history of breast cancer.    SEXUAL Hx:  She is currently sexually active.    [place] is painful: occasionally \"pressure \" due to bowels or bladder; there is some dryness ;try [place] gel              She has concerns about domestic violence no    HEALTH Hx:  Level of weekly physical activity: 6 hours  She wears her seat belt: yes.  Self breast awareness: no  Calcium servings per day: 2  Caffeine intake:1 equivalent to a cup of coffee  Social History     Substance and Sexual Activity   Alcohol Use No                 Alcohol:does not drink              Social History    Tobacco Use      Smoking status: Never Smoker      Smokeless tobacco: Never Used      The following portions of the patient's history were reviewed and updated as appropriate:problem list, current medications, allergies, past family history, past medical history, past social history and past surgical history    Current Outpatient Medications:   •  ibuprofen (ADVIL,MOTRIN) 800 MG tablet, TAKE 1 TABLET BY MOUTH THREE TIMES A DAY WITH FOOD, Disp: , Rfl: 5  •  omeprazole (priLOSEC) 20 MG capsule, TAKE 1 CAPSULE BY MOUTH IN THE MORNING AS NEEDED for gastritis, Disp: , " "Rfl: 5  •  OTEZLA 30 MG tablet, , Disp: , Rfl:   •  predniSONE (DELTASONE) 5 MG tablet, Take 2 tablets by mouth Daily As Needed (for)., Disp: 30 tablet, Rfl: 1  •  promethazine (PHENERGAN) 25 MG tablet, Take 1 tablet by mouth Every 6 (Six) Hours As Needed for Nausea or Vomiting., Disp: 20 tablet, Rfl: 0  •  SUMAtriptan (IMITREX) 100 MG tablet, Take 1 tablet by mouth 1 (One) Time As Needed for Migraine (may repeat once in 2 hours if needed) for up to 1 dose., Disp: 9 tablet, Rfl: 5  •  estradiol (EVAMIST) 1.53 MG/SPRAY transdermal spray, Place 1 spray on the skin as directed by provider Daily., Disp: 1 each, Rfl: 12    Review of Systems  Constitutional POS: nothing reported ; maybe psoriatic arthritis    NEG: anorexia, fevers or night sweats   Genitourinary POS: nothing reported    NEG: dysuria, frequency or hematuria   Gastointestinal POS: abdominal pain and diarrhea    NEG: bloating, change in bowel habits or melena   Integument POS: nothing reported    NEG: moles that are changing in size, shape, color or rashes   Breast POS: nothing reported    NEG: persistent breast lump, skin dimpling or nipple discharge        Objective   /70   Ht 155.6 cm (61.25\")   Wt 63 kg (139 lb)   Breastfeeding? No   BMI 26.05 kg/m²     General:  well developed; well nourished  no acute distress  appears stated age   Skin:  edema in ankles   Thyroid: not examined   Breasts:  Examined in supine position  Symmetric without masses or skin dimpling  Nipples normal without inversion, lesions or discharge  There are no palpable axillary nodes   Abdomen: soft, non-tender; no masses  no umbilical or inguinal hernias are present  no hepato-splenomegaly  midline incison   Pelvis: Clinical staff was present for exam  External genitalia:  normal appearance of the external genitalia including Bartholin's and McMillin's glands.  :  urethral meatus normal;  Vaginal:  normal pink mucosa without prolapse or lesions.  Uterus:  absent.  Adnexa:  " non palpable bilaterally.       Lab and Imaging Review   BMP, CMP and Vitamin D  No data reviewed       Assessment   1. Doing well with estrogen replacement therapy status post hysterectomy.  But no problems with bladder or bowel except may be some pressure with intercourse but benign on examination doubtful that ultrasound would be helpful.  Due to cost we will try to switch her to Evamist and see if that is less expensive as the generic patches are $50 per month.  She can always switch back.  2. Gynecologic, breast and colorectal screening protocols were reviewed.       Plan   1. Will give  Evamist a trial.  2. The importance of keeping all planned follow-up and taking all medications as prescribed was emphasized.  3. Self breast awareness and mammogram protocols discussed.  4. Regular exercise and calcium ( ideally dietary) discussed  5. Follow up for annual exam or PRN     New Medications Ordered This Visit   Medications   • estradiol (EVAMIST) 1.53 MG/SPRAY transdermal spray     Sig: Place 1 spray on the skin as directed by provider Daily.     Dispense:  1 each     Refill:  12     No orders of the defined types were placed in this encounter.         This note was electronically signed.    Vinayak Petersen MD  May 6, 2019    Note: Speech recognition transcription software may have been used to create portions of this document.  An attempt at proofreading has been made but errors in transcription could still be present.

## 2019-05-17 ENCOUNTER — OFFICE VISIT (OUTPATIENT)
Dept: INTERNAL MEDICINE | Facility: CLINIC | Age: 44
End: 2019-05-17

## 2019-05-17 VITALS
OXYGEN SATURATION: 99 % | TEMPERATURE: 98.5 F | DIASTOLIC BLOOD PRESSURE: 78 MMHG | BODY MASS INDEX: 26.24 KG/M2 | HEIGHT: 61 IN | SYSTOLIC BLOOD PRESSURE: 110 MMHG | WEIGHT: 139 LBS | HEART RATE: 102 BPM

## 2019-05-17 DIAGNOSIS — L40.50 ARTHROPATHIC PSORIASIS (HCC): Primary | ICD-10-CM

## 2019-05-17 DIAGNOSIS — M06.4 INFLAMMATORY POLYARTHROPATHY (HCC): ICD-10-CM

## 2019-05-17 PROCEDURE — 99213 OFFICE O/P EST LOW 20 MIN: CPT | Performed by: PHYSICIAN ASSISTANT

## 2019-05-17 RX ORDER — PREDNISONE 2.5 MG
2.5 TABLET ORAL DAILY
Qty: 30 TABLET | Refills: 1 | Status: SHIPPED | OUTPATIENT
Start: 2019-05-17 | End: 2019-09-24

## 2019-05-17 NOTE — PROGRESS NOTES
Umberto Oliva is a 44 y.o. female.     Subjective   History of Present Illness   Here today with concern of swelling and increased rash in her right leg and ankle.  She is currently treated with Otezla as prescribed by her rheumatologist for unconfirmed diagnosis of psoriatic arthritis.  Otezla seemed to help for a few months but has not seemed to help much since then.  She is also prescribed prednisone 5 mg daily as needed which she does not take very often due to it making her feel poorly.  She does admit that prednisone does help pain and swelling of the leg and ankle.  She notes increased pain with standing and ambulating.  In recent months she has also started to develop swelling and rash of the left ankle region.  She denies chest pain, dyspnea, orthopnea, palpitations, headaches, weakness, visual disturbances or confusion.         The following portions of the patient's history were reviewed and updated as appropriate: allergies, current medications, past family history, past medical history, past social history, past surgical history and problem list.    Review of Systems   Constitutional: Negative for appetite change, chills, diaphoresis, fatigue, fever and unexpected weight change.   Eyes: Negative for visual disturbance.   Respiratory: Negative for cough, chest tightness, shortness of breath and wheezing.    Cardiovascular: Negative for chest pain, palpitations and leg swelling.   Musculoskeletal: Positive for arthralgias, gait problem and joint swelling. Negative for back pain, neck pain and neck stiffness.   Skin: Positive for rash. Negative for color change, pallor and wound.   Neurological: Negative for dizziness, tremors, syncope, speech difficulty, weakness, numbness and headaches.   Hematological: Negative for adenopathy. Does not bruise/bleed easily.   Psychiatric/Behavioral: Negative for agitation, confusion, dysphoric mood, self-injury and suicidal ideas. The patient is not nervous/anxious.   "        Objective    Physical Exam   Constitutional: She is oriented to person, place, and time. She appears well-developed and well-nourished. No distress.   HENT:   Head: Normocephalic and atraumatic.   Eyes: Conjunctivae and EOM are normal. Pupils are equal, round, and reactive to light.   Neck: Normal range of motion. Neck supple.   Cardiovascular: Normal rate, regular rhythm and normal heart sounds. Exam reveals no gallop and no friction rub.   No murmur heard.  Pulmonary/Chest: Effort normal and breath sounds normal. No respiratory distress. She has no wheezes. She has no rales.   Abdominal: Soft. Bowel sounds are normal. There is no tenderness.   Musculoskeletal: Normal range of motion. She exhibits edema ( Bilateral ankles into lower legs, nonpitting.) and tenderness ( Bilateral ankles into lower legs.). She exhibits no deformity.   Lymphadenopathy:     She has no cervical adenopathy.   Neurological: She is alert and oriented to person, place, and time. She displays normal reflexes. No cranial nerve deficit or sensory deficit. She exhibits normal muscle tone. Coordination normal.   Skin: Skin is warm and dry. Capillary refill takes less than 2 seconds. Rash ( [place] of dusky/erythematous macules of bilateral ankles into lower legs, right greater than left.) noted. She is not diaphoretic. No pallor.   Psychiatric: She has a normal mood and affect. Her behavior is normal. Judgment and thought content normal.   Nursing note and vitals reviewed.        /78   Pulse 102   Temp 98.5 °F (36.9 °C)   Ht 155.6 cm (61.26\")   Wt 63 kg (139 lb)   SpO2 99%   BMI 26.04 kg/m²     Nursing note and vitals reviewed.        Assessment/Plan   Umberto was seen today for leg and ankle swelling.    Diagnoses and all orders for this visit:    Arthropathic psoriasis (CMS/HCC)    Inflammatory polyarthropathy (CMS/HCC)  -     predniSONE (DELTASONE) 2.5 MG tablet; Take 1 tablet by mouth Daily.  -     Lyme, Total Antibody " Test / Reflex  -     RUDDY Comprehensive Plus Profile    Due to acute flareup of inflammation and rash she was encouraged to take prednisone 5 mg daily for 2 to 3 days then decrease to prednisone 2.5 mg daily or every other day as tolerated.    Continue follow-up with Dr. Capps, rheumatologist.    Call or return to clinic if symptoms worsen or persist.

## 2019-06-26 LAB
B BURGDOR IGG+IGM SER-ACNC: <0.91 ISR (ref 0–0.9)
CENTROMERE B AB SER-ACNC: <0.2 AI (ref 0–0.9)
CHROMATIN AB SERPL-ACNC: <0.2 AI (ref 0–0.9)
DSDNA AB SER-ACNC: <1 IU/ML (ref 0–9)
ENA JO1 AB SER-ACNC: <0.2 AI (ref 0–0.9)
ENA RNP AB SER-ACNC: 0.5 AI (ref 0–0.9)
ENA SCL70 AB SER-ACNC: <0.2 AI (ref 0–0.9)
ENA SM AB SER-ACNC: 0.2 AI (ref 0–0.9)
ENA SM+RNP AB SER-ACNC: <0.2 AI (ref 0–0.9)
ENA SS-A AB SER-ACNC: <0.2 AI (ref 0–0.9)
ENA SS-B AB SER-ACNC: <0.2 AI (ref 0–0.9)
Lab: NORMAL
RIBOSOMAL P AB SER-ACNC: <0.2 AI (ref 0–0.9)

## 2019-09-24 ENCOUNTER — HOSPITAL ENCOUNTER (OUTPATIENT)
Dept: GENERAL RADIOLOGY | Facility: HOSPITAL | Age: 44
Discharge: HOME | End: 2019-09-24
Admitting: PHYSICIAN ASSISTANT

## 2019-09-24 ENCOUNTER — OFFICE VISIT (OUTPATIENT)
Dept: INTERNAL MEDICINE | Facility: CLINIC | Age: 44
End: 2019-09-24

## 2019-09-24 VITALS
BODY MASS INDEX: 27.38 KG/M2 | DIASTOLIC BLOOD PRESSURE: 78 MMHG | HEART RATE: 108 BPM | SYSTOLIC BLOOD PRESSURE: 118 MMHG | HEIGHT: 61 IN | OXYGEN SATURATION: 99 % | TEMPERATURE: 98.3 F | WEIGHT: 145 LBS

## 2019-09-24 DIAGNOSIS — R53.81 MALAISE: ICD-10-CM

## 2019-09-24 DIAGNOSIS — M54.9 UPPER BACK PAIN: ICD-10-CM

## 2019-09-24 DIAGNOSIS — R07.81 PLEURITIC CHEST PAIN: ICD-10-CM

## 2019-09-24 DIAGNOSIS — M54.2 NECK PAIN: ICD-10-CM

## 2019-09-24 DIAGNOSIS — R10.811 RIGHT UPPER QUADRANT ABDOMINAL TENDERNESS WITHOUT REBOUND TENDERNESS: Primary | ICD-10-CM

## 2019-09-24 PROCEDURE — 99213 OFFICE O/P EST LOW 20 MIN: CPT | Performed by: PHYSICIAN ASSISTANT

## 2019-09-24 PROCEDURE — 71046 X-RAY EXAM CHEST 2 VIEWS: CPT

## 2019-09-24 RX ORDER — GABAPENTIN 300 MG/1
300 CAPSULE ORAL
Refills: 1 | COMMUNITY
Start: 2019-08-15 | End: 2019-11-13

## 2019-09-24 NOTE — PROGRESS NOTES
Umberto Oliva is a 44 y.o. female.     Subjective   History of Present Illness   Here today with concern of pain in bilateral shoulder blades, upper back and neck worsening for the last month or so. The pain has become so bothersome she has been unable to sleep.  She has developed some trouble with increased pain with taking deep breaths in the last few days. She also reports generalized feeling of weakness.  Heat and ice do not help.  Ibuprofen 800 mg does help some. New today she has developed pain under the right lower anterior ribs.  No fever or chills.  No redness or warmth of any painful sites.  She does have an itchy rash on her upper back which she feels may be due to Aspercream use.  No headache, numbness, tingling, visual disturbances, cough or wheezing.     She has been using Humira for 7 weeks and symptoms began shortly after initiating it.  She has discussed the current symptoms with her rheumatologist, Dr. Capps, who only told her to skip this dose of Humira this week. She took a single dose of prednisone and gabapentin Friday due to intense pain but none since then.         The following portions of the patient's history were reviewed and updated as appropriate: allergies, current medications, past family history, past medical history, past social history, past surgical history and problem list.    Review of Systems   Constitutional: Positive for chills (one day last week) and fatigue. Negative for activity change, appetite change, diaphoresis, fever and unexpected weight change.        Malaise   HENT: Negative for congestion, ear discharge, hearing loss, nosebleeds, sinus pressure, sinus pain, sneezing, sore throat and trouble swallowing.    Eyes: Negative for pain, discharge, redness and visual disturbance.   Respiratory: Negative for apnea, cough, choking, chest tightness, shortness of breath, wheezing and stridor.    Cardiovascular: Positive for chest pain (pleuritic). Negative for palpitations  and leg swelling.   Gastrointestinal: Positive for abdominal pain. Negative for abdominal distention, anal bleeding, blood in stool, constipation, diarrhea, nausea, rectal pain and vomiting.   Endocrine: Negative for cold intolerance, heat intolerance, polydipsia, polyphagia and polyuria.   Genitourinary: Negative.    Musculoskeletal: Positive for arthralgias, back pain, myalgias and neck pain. Negative for gait problem, joint swelling and neck stiffness.   Allergic/Immunologic: Positive for immunocompromised state.   Neurological: Positive for weakness (generalized). Negative for dizziness, tremors, syncope, speech difficulty, numbness and headaches.   Hematological: Negative for adenopathy. Does not bruise/bleed easily.   Psychiatric/Behavioral: Positive for sleep disturbance. Negative for agitation, confusion, decreased concentration, dysphoric mood, self-injury and suicidal ideas. The patient is not nervous/anxious.          Objective    Physical Exam   Constitutional: She is oriented to person, place, and time. She appears well-developed and well-nourished. No distress.   HENT:   Head: Normocephalic and atraumatic.   Mouth/Throat: Oropharynx is clear and moist.   Eyes: Conjunctivae and EOM are normal. Pupils are equal, round, and reactive to light.   Neck: Normal range of motion. Neck supple.   Cardiovascular: Normal rate, regular rhythm and normal heart sounds. Exam reveals no gallop and no friction rub.   No murmur heard.  Pulmonary/Chest: Effort normal and breath sounds normal. No respiratory distress. She has no wheezes. She has no rales. She exhibits tenderness (chest wall).   Abdominal: Soft. Bowel sounds are normal. She exhibits no distension and no mass. There is tenderness (RUQ). No hernia.   Musculoskeletal: Normal range of motion. She exhibits tenderness (diffuse neck, upper back and bilateral shoulder muscles tenderness). She exhibits no edema or deformity.   Lymphadenopathy:     She has no cervical  "adenopathy.   Neurological: She is alert and oriented to person, place, and time. She displays normal reflexes. No cranial nerve deficit or sensory deficit. She exhibits normal muscle tone. Coordination normal.   Skin: Skin is warm and dry. Capillary refill takes less than 2 seconds. Rash (contact dermatitis upper back) noted. She is not diaphoretic. No erythema. No pallor.   Psychiatric: She has a normal mood and affect. Her behavior is normal. Judgment and thought content normal.   Nursing note and vitals reviewed.        /78   Pulse 108   Temp 98.3 °F (36.8 °C)   Ht 155.6 cm (61.26\")   Wt 65.8 kg (145 lb)   SpO2 99%   BMI 27.17 kg/m²     Nursing note and vitals reviewed.          Assessment/Plan   Umberto was seen today for shoulder pain.    Diagnoses and all orders for this visit:    Right upper quadrant abdominal tenderness without rebound tenderness  -     CBC & Differential  -     Comprehensive Metabolic Panel  -     Hepatitis Panel, Acute    Malaise  -     XR Chest PA & Lateral  -     CBC & Differential  -     Comprehensive Metabolic Panel  -     Hepatitis Panel, Acute    Pleuritic chest pain  -     XR Chest PA & Lateral  -     CBC & Differential  -     Comprehensive Metabolic Panel    Upper back pain  -     XR Chest PA & Lateral  -     CBC & Differential  -     Comprehensive Metabolic Panel    Neck pain  -     CBC & Differential  -     Comprehensive Metabolic Panel        Symptoms reported are all possible side effects of Humira. She was directed to hold further Humira doses and schedule a sooner follow up with her rheumatologist.          "

## 2019-09-25 DIAGNOSIS — R53.82 CHRONIC FATIGUE: Primary | ICD-10-CM

## 2019-09-25 DIAGNOSIS — E55.9 VITAMIN D DEFICIENCY: ICD-10-CM

## 2019-09-25 LAB
ALBUMIN SERPL-MCNC: 4.6 G/DL (ref 3.5–5.2)
ALBUMIN/GLOB SERPL: 1.7 G/DL
ALP SERPL-CCNC: 87 U/L (ref 39–117)
ALT SERPL-CCNC: 11 U/L (ref 1–33)
AST SERPL-CCNC: 19 U/L (ref 1–32)
BASOPHILS # BLD AUTO: 0.08 10*3/MM3 (ref 0–0.2)
BASOPHILS NFR BLD AUTO: 0.9 % (ref 0–1.5)
BILIRUB SERPL-MCNC: 0.2 MG/DL (ref 0.2–1.2)
BUN SERPL-MCNC: 12 MG/DL (ref 6–20)
BUN/CREAT SERPL: 16.7 (ref 7–25)
CALCIUM SERPL-MCNC: 9.6 MG/DL (ref 8.6–10.5)
CHLORIDE SERPL-SCNC: 101 MMOL/L (ref 98–107)
CO2 SERPL-SCNC: 25.5 MMOL/L (ref 22–29)
CREAT SERPL-MCNC: 0.72 MG/DL (ref 0.57–1)
EOSINOPHIL # BLD AUTO: 0.06 10*3/MM3 (ref 0–0.4)
EOSINOPHIL NFR BLD AUTO: 0.7 % (ref 0.3–6.2)
ERYTHROCYTE [DISTWIDTH] IN BLOOD BY AUTOMATED COUNT: 13.1 % (ref 12.3–15.4)
GLOBULIN SER CALC-MCNC: 2.7 GM/DL
GLUCOSE SERPL-MCNC: 84 MG/DL (ref 65–99)
HAV IGM SERPL QL IA: NEGATIVE
HBV CORE IGM SERPL QL IA: NEGATIVE
HBV SURFACE AG SERPL QL IA: NEGATIVE
HCT VFR BLD AUTO: 36.5 % (ref 34–46.6)
HCV AB S/CO SERPL IA: <0.1 S/CO RATIO (ref 0–0.9)
HGB BLD-MCNC: 12.3 G/DL (ref 12–15.9)
IMM GRANULOCYTES # BLD AUTO: 0.02 10*3/MM3 (ref 0–0.05)
IMM GRANULOCYTES NFR BLD AUTO: 0.2 % (ref 0–0.5)
LYMPHOCYTES # BLD AUTO: 4.45 10*3/MM3 (ref 0.7–3.1)
LYMPHOCYTES NFR BLD AUTO: 52.8 % (ref 19.6–45.3)
MCH RBC QN AUTO: 29 PG (ref 26.6–33)
MCHC RBC AUTO-ENTMCNC: 33.7 G/DL (ref 31.5–35.7)
MCV RBC AUTO: 86.1 FL (ref 79–97)
MONOCYTES # BLD AUTO: 0.61 10*3/MM3 (ref 0.1–0.9)
MONOCYTES NFR BLD AUTO: 7.2 % (ref 5–12)
NEUTROPHILS # BLD AUTO: 3.21 10*3/MM3 (ref 1.7–7)
NEUTROPHILS NFR BLD AUTO: 38.2 % (ref 42.7–76)
NRBC BLD AUTO-RTO: 0 /100 WBC (ref 0–0.2)
PLATELET # BLD AUTO: 329 10*3/MM3 (ref 140–450)
POTASSIUM SERPL-SCNC: 4.4 MMOL/L (ref 3.5–5.2)
PROT SERPL-MCNC: 7.3 G/DL (ref 6–8.5)
RBC # BLD AUTO: 4.24 10*6/MM3 (ref 3.77–5.28)
SODIUM SERPL-SCNC: 143 MMOL/L (ref 136–145)
WBC # BLD AUTO: 8.43 10*3/MM3 (ref 3.4–10.8)

## 2019-10-14 ENCOUNTER — HOSPITAL ENCOUNTER (OUTPATIENT)
Dept: GENERAL RADIOLOGY | Facility: HOSPITAL | Age: 44
Discharge: HOME | End: 2019-10-14
Admitting: INTERNAL MEDICINE

## 2019-10-14 ENCOUNTER — TRANSCRIBE ORDERS (OUTPATIENT)
Dept: GENERAL RADIOLOGY | Facility: HOSPITAL | Age: 44
End: 2019-10-14

## 2019-10-14 DIAGNOSIS — M79.10 MYALGIA: ICD-10-CM

## 2019-10-14 DIAGNOSIS — M13.0 POLYARTHRITIS: ICD-10-CM

## 2019-10-14 DIAGNOSIS — L40.50 PSORIATIC ARTHROPATHY (HCC): Primary | ICD-10-CM

## 2019-10-14 DIAGNOSIS — Z79.899 ENCOUNTER FOR LONG-TERM (CURRENT) USE OF OTHER MEDICATIONS: ICD-10-CM

## 2019-10-14 DIAGNOSIS — M70.62 GREATER TROCHANTERIC BURSITIS OF LEFT HIP: ICD-10-CM

## 2019-10-14 DIAGNOSIS — M06.4 INFLAMMATORY POLYARTHROPATHY (HCC): ICD-10-CM

## 2019-10-14 DIAGNOSIS — M79.10 MYALGIA, UNSPECIFIED SITE: ICD-10-CM

## 2019-10-14 DIAGNOSIS — R53.82 CHRONIC FATIGUE, UNSPECIFIED: ICD-10-CM

## 2019-10-14 DIAGNOSIS — R20.2 PARESTHESIAS: ICD-10-CM

## 2019-10-14 DIAGNOSIS — M79.7 FIBROMYALGIA: ICD-10-CM

## 2019-10-14 PROCEDURE — 73030 X-RAY EXAM OF SHOULDER: CPT

## 2019-11-01 ENCOUNTER — OFFICE VISIT (OUTPATIENT)
Dept: NEUROLOGY | Facility: CLINIC | Age: 44
End: 2019-11-01

## 2019-11-01 ENCOUNTER — APPOINTMENT (OUTPATIENT)
Dept: LAB | Facility: HOSPITAL | Age: 44
End: 2019-11-01

## 2019-11-01 VITALS — HEIGHT: 61 IN | BODY MASS INDEX: 27.38 KG/M2 | WEIGHT: 145 LBS

## 2019-11-01 DIAGNOSIS — R20.2 TINGLING IN EXTREMITIES: Primary | ICD-10-CM

## 2019-11-01 PROCEDURE — 84207 ASSAY OF VITAMIN B-6: CPT | Performed by: PHYSICIAN ASSISTANT

## 2019-11-01 PROCEDURE — 82746 ASSAY OF FOLIC ACID SERUM: CPT | Performed by: PHYSICIAN ASSISTANT

## 2019-11-01 PROCEDURE — 82607 VITAMIN B-12: CPT | Performed by: PHYSICIAN ASSISTANT

## 2019-11-01 PROCEDURE — 84165 PROTEIN E-PHORESIS SERUM: CPT | Performed by: PHYSICIAN ASSISTANT

## 2019-11-01 PROCEDURE — 99215 OFFICE O/P EST HI 40 MIN: CPT | Performed by: PHYSICIAN ASSISTANT

## 2019-11-01 PROCEDURE — 36415 COLL VENOUS BLD VENIPUNCTURE: CPT | Performed by: PHYSICIAN ASSISTANT

## 2019-11-01 PROCEDURE — 84155 ASSAY OF PROTEIN SERUM: CPT | Performed by: PHYSICIAN ASSISTANT

## 2019-11-01 RX ORDER — BACLOFEN 10 MG/1
10 TABLET ORAL 3 TIMES DAILY
COMMUNITY
End: 2019-11-13 | Stop reason: DRUGHIGH

## 2019-11-01 RX ORDER — DICLOFENAC POTASSIUM 50 MG/1
POWDER, FOR SOLUTION ORAL
COMMUNITY
End: 2019-12-23

## 2019-11-01 RX ORDER — PREGABALIN 25 MG/1
25 CAPSULE ORAL 2 TIMES DAILY
Refills: 4 | COMMUNITY
Start: 2019-10-03 | End: 2019-11-13

## 2019-11-01 NOTE — PROGRESS NOTES
"Subjective     Chief Complaint: headaches      History of Present Illness   Umberto Oliva is a 44 y.o. female who returns to clinic today for evaluation of headaches. She initially noted symptoms several years ago marked by a dull headache located frontally bilaterally as well as at the top of her head. This has significantly worsened since 2/18 and is now nearly daily. The pain radiates to her neck. She notes associated nausea and lightheadedness as well as light and sound sensitivity. Additionally, she notes associated spots in her vision bilaterally. There are not any clear modifying factors.      Prior evaluation has included an MRI of the brain  In 2/17 which was unremarkable. She is currently taking Imitrex and phenergan PRN, which has been somewhat beneficial.      Today: Since her last visit in 3/18, she notes that her headaches have significantly improved and are currently manageable. She has noted a throbbing pain in her right ankle since at least 7/18. There is associated edema, erythema, numbness and tingling as well as possible weakness. While this has somewhat improved over time, she now notes pain, paresthesias, and possible weakness in the left lower extremity as well as her arms bilaterally. She is currently following closely with rheumatology and dermatology.       I have reviewed and confirmed the past family, social and medical history as accurate on 11/1/19.     Review of Systems   Constitutional: Negative.    HENT: Negative.    Eyes: Negative.    Respiratory: Negative.    Cardiovascular: Negative.    Gastrointestinal: Negative.    Endocrine: Negative.    Genitourinary: Negative.    Musculoskeletal: Positive for back pain and joint swelling.   Skin: Negative.    Allergic/Immunologic: Negative.    Hematological: Negative.    Psychiatric/Behavioral: Negative.        Objective     Ht 155.6 cm (61.26\")   Wt 65.8 kg (145 lb)   BMI 27.17 kg/m²     General appearance today is normal.     Physical " Exam   Neurological: She has normal strength. She has a normal Finger-Nose-Finger Test. Gait normal.   Reflex Scores:       Bicep reflexes are 2+ on the right side and 2+ on the left side.       Brachioradialis reflexes are 2+ on the right side and 2+ on the left side.       Patellar reflexes are 2+ on the right side and 2+ on the left side.       Neurologic Exam     Cranial Nerves   Cranial nerves II through XII intact.     Motor Exam   Muscle bulk: normal  Overall muscle tone: normal    Strength   Strength 5/5 throughout.     Sensory Exam   Light touch normal.     Gait, Coordination, and Reflexes     Gait  Gait: normal    Coordination   Finger to nose coordination: normal    Tremor   Resting tremor: absent    Reflexes   Right brachioradialis: 2+  Left brachioradialis: 2+  Right biceps: 2+  Left biceps: 2+  Right patellar: 2+  Left patellar: 2+          Assessment/Plan   Umberto was seen today for headache.    Diagnoses and all orders for this visit:    Tingling in extremities  -     Folate  -     Vitamin B12  -     MRI Brain With & Without Contrast  -     EMG & Nerve Conduction Test  -     Vitamin B6  -     Protein Elec + Interp, Serum          Discussion/Summary   Umberto Oliva returns to clinic today for evaluation of headaches as well as pain and paresthesias in her extremities. While the etiology is a bit unclear, her neurologic examination today is reassuringly normal. This was discussed in detail. It was elected to obtain screening blood work and a repeat MRI of the brain as her rheumatologist would like to rule out MS (though this is admittedly unlikely given her history). It was also elected to obtain an EMG of her upper and lower extremities bilaterally. I encouraged her to continue following closely with rheumatology. She will then follow up in our clinic based on the results of her diagnostic tests.   I spent 40 minutes face to face with the patient with 25 minutes spent on discussing diagnosis,  prognosis, diagnostic testing, evaluation and current status as discussed above.       As part of this visit I reviewed prior lab results, reviewed radiology results and reviewed radiology images.      Sulema Calles PA-C

## 2019-11-02 LAB
FOLATE SERPL-MCNC: 7.59 NG/ML (ref 4.78–24.2)
VIT B12 BLD-MCNC: 216 PG/ML (ref 211–946)

## 2019-11-04 ENCOUNTER — TELEPHONE (OUTPATIENT)
Dept: NEUROLOGY | Facility: CLINIC | Age: 44
End: 2019-11-04

## 2019-11-04 PROBLEM — E53.8 B12 DEFICIENCY: Status: ACTIVE | Noted: 2019-11-04

## 2019-11-04 LAB
ALBUMIN SERPL-MCNC: 3.7 G/DL (ref 2.9–4.4)
ALBUMIN/GLOB SERPL: 1 {RATIO} (ref 0.7–1.7)
ALPHA1 GLOB FLD ELPH-MCNC: 0.3 G/DL (ref 0–0.4)
ALPHA2 GLOB SERPL ELPH-MCNC: 0.9 G/DL (ref 0.4–1)
B-GLOBULIN SERPL ELPH-MCNC: 1.3 G/DL (ref 0.7–1.3)
GAMMA GLOB SERPL ELPH-MCNC: 1.2 G/DL (ref 0.4–1.8)
GLOBULIN SER CALC-MCNC: 3.6 G/DL (ref 2.2–3.9)
Lab: NORMAL
M-SPIKE: NORMAL G/DL
PROT PATTERN SERPL ELPH-IMP: NORMAL
PROT SERPL-MCNC: 7.3 G/DL (ref 6–8.5)

## 2019-11-04 NOTE — TELEPHONE ENCOUNTER
Left detailed vm for Umberto relaying results/recomendation per ARIELLE Rousseau. Also Notified I have routed these on to her PCP for further review. Thanks!  Office # given if any further questions.

## 2019-11-04 NOTE — TELEPHONE ENCOUNTER
Sulema Calles PA-C Dieruf, Stephanie S, MA             Would you care to let Ms. Oliva know that I have reviewed some of her labs. Her B12 level was a bit low. Therefore, I would recommend adding B12 1000mcg daily (she can find OTC) and then rechecking in 6-8 weeks. We will forward the results to her PCP as well. Thanks!

## 2019-11-05 LAB — VIT B6 SERPL-MCNC: 7.8 UG/L (ref 2–32.8)

## 2019-11-11 ENCOUNTER — HOSPITAL ENCOUNTER (OUTPATIENT)
Dept: MRI IMAGING | Facility: HOSPITAL | Age: 44
Discharge: HOME | End: 2019-11-11
Admitting: PHYSICIAN ASSISTANT

## 2019-11-11 PROCEDURE — 0 GADOBENATE DIMEGLUMINE 529 MG/ML SOLUTION: Performed by: PHYSICIAN ASSISTANT

## 2019-11-11 PROCEDURE — 70553 MRI BRAIN STEM W/O & W/DYE: CPT

## 2019-11-11 PROCEDURE — A9577 INJ MULTIHANCE: HCPCS | Performed by: PHYSICIAN ASSISTANT

## 2019-11-11 RX ADMIN — GADOBENATE DIMEGLUMINE 13 ML: 529 INJECTION, SOLUTION INTRAVENOUS at 14:16

## 2019-11-13 ENCOUNTER — OFFICE VISIT (OUTPATIENT)
Dept: INTERNAL MEDICINE | Facility: CLINIC | Age: 44
End: 2019-11-13

## 2019-11-13 ENCOUNTER — TELEPHONE (OUTPATIENT)
Dept: NEUROLOGY | Facility: CLINIC | Age: 44
End: 2019-11-13

## 2019-11-13 VITALS
TEMPERATURE: 98.3 F | DIASTOLIC BLOOD PRESSURE: 70 MMHG | HEIGHT: 61 IN | BODY MASS INDEX: 27.9 KG/M2 | HEART RATE: 110 BPM | SYSTOLIC BLOOD PRESSURE: 118 MMHG | OXYGEN SATURATION: 97 % | WEIGHT: 147.8 LBS

## 2019-11-13 DIAGNOSIS — R05.9 COUGH: ICD-10-CM

## 2019-11-13 DIAGNOSIS — J01.40 ACUTE NON-RECURRENT PANSINUSITIS: Primary | ICD-10-CM

## 2019-11-13 PROCEDURE — 99213 OFFICE O/P EST LOW 20 MIN: CPT | Performed by: NURSE PRACTITIONER

## 2019-11-13 RX ORDER — BACLOFEN 20 MG/1
20 TABLET ORAL
COMMUNITY
Start: 2019-11-05 | End: 2019-12-23

## 2019-11-13 RX ORDER — DOXYCYCLINE HYCLATE 100 MG/1
100 CAPSULE ORAL 2 TIMES DAILY
Qty: 20 CAPSULE | Refills: 0 | Status: SHIPPED | OUTPATIENT
Start: 2019-11-13 | End: 2019-11-23

## 2019-11-13 RX ORDER — PREDNISONE 10 MG/1
10 TABLET ORAL DAILY
COMMUNITY
Start: 2019-11-05 | End: 2019-12-23

## 2019-11-13 RX ORDER — DEXTROMETHORPHAN HYDROBROMIDE AND PROMETHAZINE HYDROCHLORIDE 15; 6.25 MG/5ML; MG/5ML
2.5 SYRUP ORAL 4 TIMES DAILY PRN
Qty: 118 ML | Refills: 0 | Status: SHIPPED | OUTPATIENT
Start: 2019-11-13 | End: 2019-12-23

## 2019-11-13 NOTE — PROGRESS NOTES
"Date: 2019    Name: Umberto Oliva  : 1975    Chief Complaint:   Chief Complaint   Patient presents with   • Headache     sinus pressure, cough, congestion, sweats, aches       HPI:  Umberto Oliva is a 44 y.o. female presents with nasal congestion, sore throat, hoarseness, cough, pounding headache, chills, diaphoresis, wheezing in the mornings, earache/fullness, dizziness for over a week.  She is treated for RA with frequent corticosteroid bursts.  She has most recently been given a kenalog injection, has stopped taking prednisone PO.  Her fatigue has increased in the past 2-3 days.  Denies sneezing, vision changes, pressure in upper teeth, n/v/d.  She has not taken any medication for these symptoms. No sick contacts.      History:  The following portions of the patient's history were reviewed and updated as appropriate: allergies, current medications, past medical history, family history, surgical history, social history and problem list.     ROS:  Review of Systems   Constitutional: Negative for activity change and appetite change.   HENT: Negative for ear discharge, facial swelling, tinnitus, trouble swallowing and voice change.    Respiratory: Negative for chest tightness.    Cardiovascular: Negative for palpitations and leg swelling.   Musculoskeletal: Negative for gait problem and myalgias.   Skin: Negative for pallor and rash.       VS:  Vitals:    19 1532   BP: 118/70   Pulse: 110   Temp: 98.3 °F (36.8 °C)   TempSrc: Temporal   SpO2: 97%   Weight: 67 kg (147 lb 12.8 oz)   Height: 155.6 cm (61.26\")     Body mass index is 27.69 kg/m².    PE:  Physical Exam   Constitutional: She is oriented to person, place, and time. She appears well-developed and well-nourished. She appears ill.   HENT:   Head: Normocephalic.   Right Ear: Tympanic membrane, external ear and ear canal normal.   Left Ear: Tympanic membrane, external ear and ear canal normal.   Nose: Congestion present. Right sinus exhibits " maxillary sinus tenderness and frontal sinus tenderness. Left sinus exhibits maxillary sinus tenderness and frontal sinus tenderness.   Mouth/Throat: Uvula is midline and mucous membranes are normal. Posterior oropharyngeal erythema present.   Eyes: Conjunctivae are normal. Pupils are equal, round, and reactive to light.   Neck: Normal range of motion. Neck supple.   Cardiovascular: Normal rate, regular rhythm, normal heart sounds and intact distal pulses.   Pulmonary/Chest: Effort normal and breath sounds normal.   Musculoskeletal: Normal range of motion. She exhibits no edema.   Lymphadenopathy:     She has no cervical adenopathy.   Neurological: She is alert and oriented to person, place, and time.   Skin: Skin is warm. Capillary refill takes less than 2 seconds.   Psychiatric: She has a normal mood and affect. Her behavior is normal.     Assessment/Plan:  Umberto was seen today for headache.    Diagnoses and all orders for this visit:    Acute non-recurrent pansinusitis  -     doxycycline (VIBRAMYCIN) 100 MG capsule; Take 1 capsule by mouth 2 (Two) Times a Day for 10 days.  - Drink plenty of clear, decaffeinated fluids, as tolerated.  - Warm compress to face, prn sinus pressure/pain  - May use flonase, per package directions, prn nasal congestion  Cough  -     promethazine-dextromethorphan (PROMETHAZINE-DM) 6.25-15 MG/5ML syrup; Take 2.5 mL by mouth 4 (Four) Times a Day As Needed for Cough. May increase to 5 mL if needed.  Advised may cause drowsiness; not to be taken with cold/cough/sinus remedies, alcohol or sedatives.  - Cough drops, as needed    Return if symptoms worsen or fail to improve.

## 2019-11-13 NOTE — TELEPHONE ENCOUNTER
Left detailed voicemail relaying results to Umberto. Office # given if any questions regarding this.

## 2019-11-13 NOTE — TELEPHONE ENCOUNTER
Sulema Calles PA-C Dieruf, Stephanie S, MA             Would you care to let Ms. Oliva know that we reviewed her MRI. It showed a chiari malformation. This is an incidental finding and unlikely to be causing any symptoms. Otherwise, it looked good. Therefore, we do not have any further recommendations. Thanks!    Previous Messages      ----- Message -----   From: Rakan Guevara MD   Sent: 11/13/2019  12:44 PM   To: Sulema Calles PA-C     I believe that the Chiari malformation is an incidental finding and this otherwise looks normal. However, you probably should mention this finding but that it is unlikely to be causing any symptoms based on her MRI. Thanks.     ----- Message -----   From: Sulema Calles PA-C   Sent: 11/13/2019  12:19 PM   To: Rakan Guevara MD     Would you care to review before I send a letter? Thanks     ----- Message -----   From: Nichol, Rad Results [place] In   Sent: 11/13/2019   8:36 AM   To: Sulema Calles PA-C

## 2019-11-27 ENCOUNTER — LAB (OUTPATIENT)
Dept: LAB | Facility: HOSPITAL | Age: 44
End: 2019-11-27

## 2019-11-27 ENCOUNTER — TRANSCRIBE ORDERS (OUTPATIENT)
Dept: LAB | Facility: HOSPITAL | Age: 44
End: 2019-11-27

## 2019-11-27 DIAGNOSIS — R63.4 LOSS OF WEIGHT: ICD-10-CM

## 2019-11-27 DIAGNOSIS — R53.83 TIREDNESS: ICD-10-CM

## 2019-11-27 DIAGNOSIS — R79.9 ABNORMAL BLOOD CHEMISTRY: ICD-10-CM

## 2019-11-27 DIAGNOSIS — E66.3 OVER WEIGHT: ICD-10-CM

## 2019-11-27 DIAGNOSIS — G93.32 CHRONIC FATIGUE SYNDROME: Primary | ICD-10-CM

## 2019-11-27 DIAGNOSIS — R53.81 MALAISE: ICD-10-CM

## 2019-11-27 DIAGNOSIS — G93.32 CHRONIC FATIGUE SYNDROME: ICD-10-CM

## 2019-11-27 LAB
CORTIS SERPL-MCNC: 5.43 MCG/DL
HIV1 P24 AG SER QL: NORMAL
HIV1+2 AB SER QL: NORMAL
TSH SERPL DL<=0.05 MIU/L-ACNC: 1.04 UIU/ML (ref 0.27–4.2)

## 2019-11-27 PROCEDURE — 82525 ASSAY OF COPPER: CPT

## 2019-11-27 PROCEDURE — 84443 ASSAY THYROID STIM HORMONE: CPT

## 2019-11-27 PROCEDURE — 85613 RUSSELL VIPER VENOM DILUTED: CPT

## 2019-11-27 PROCEDURE — 36415 COLL VENOUS BLD VENIPUNCTURE: CPT

## 2019-11-27 PROCEDURE — G0432 EIA HIV-1/HIV-2 SCREEN: HCPCS

## 2019-11-27 PROCEDURE — 85732 THROMBOPLASTIN TIME PARTIAL: CPT

## 2019-11-27 PROCEDURE — 84630 ASSAY OF ZINC: CPT

## 2019-11-27 PROCEDURE — 85705 THROMBOPLASTIN INHIBITION: CPT

## 2019-11-27 PROCEDURE — 86618 LYME DISEASE ANTIBODY: CPT

## 2019-11-27 PROCEDURE — 87899 AGENT NOS ASSAY W/OPTIC: CPT

## 2019-11-27 PROCEDURE — 86682 HELMINTH ANTIBODY: CPT

## 2019-11-27 PROCEDURE — 85670 THROMBIN TIME PLASMA: CPT

## 2019-11-27 PROCEDURE — 82533 TOTAL CORTISOL: CPT

## 2019-11-29 LAB
B BURGDOR IGG+IGM SER-ACNC: <0.91 ISR (ref 0–0.9)
LA NT DPL PPP: 44.8 SEC (ref 0–55)
LA NT DPL/LA NT HPL PPP-RTO: 1.25 RATIO (ref 0–1.4)
LA NT PLATELET PPP: 41.5 SEC (ref 0–51.9)
LUPUS ANTICOAGULANT REFLEX: NORMAL
SCREEN DRVVT: 45.5 SEC (ref 0–47)
THROMBIN TIME: 19.1 SEC (ref 0–23)

## 2019-11-29 PROCEDURE — 82530 CORTISOL FREE: CPT

## 2019-11-29 PROCEDURE — 81050 URINALYSIS VOLUME MEASURE: CPT

## 2019-11-29 PROCEDURE — 84120 ASSAY OF URINE PORPHYRINS: CPT

## 2019-11-30 LAB
COPPER SERPL-MCNC: 140 UG/DL (ref 72–166)
ZINC SERPL-MCNC: 64 UG/DL (ref 56–134)

## 2019-12-02 LAB
CORTIS F 24H UR-MRATE: <3 UG/24 HR (ref 6–42)
CORTIS F UR-MCNC: <1 UG/L
STRONGYLOIDES AB SER-ACNC: NEGATIVE

## 2019-12-03 LAB
COPRO1 24H UR-MCNC: 6 UG/L
COPRO1 24H UR-MRATE: 16 UG/24 HR (ref 0–24)
COPRO3 24H UR-MCNC: 30 UG/L
COPRO3 24H UR-MRATE: 79 UG/24 HR (ref 0–74)
HEPTA-CP 24H UR-MRATE: <3 UG/24 HR (ref 0–4)
HEPTA-CP UR-MCNC: <1 UG/L
HEXA-CP 24H UR-MRATE: <3 UG/24 HR (ref 0–1)
HEXA-CP UR-MCNC: <1 UG/L
PENTACARB(5-CP),24HR: <3 UG/24 HR (ref 0–4)
PENTACARBOXYL (5-CP) URINE: <1 UG/L
UROPOR 24H UR QL: 10 UG/24 HR (ref 0–24)
UROPOR UR-MCNC: 4 UG/L

## 2019-12-09 DIAGNOSIS — R82.998 LOW URINARY CORTISOL: Primary | ICD-10-CM

## 2019-12-12 ENCOUNTER — TELEPHONE (OUTPATIENT)
Dept: NEUROLOGY | Facility: CLINIC | Age: 44
End: 2019-12-12

## 2019-12-12 LAB
ACTH PLAS-MCNC: <1.1 PG/ML (ref 7.2–63.3)
CORTIS SERPL-MCNC: 0.6 UG/DL

## 2019-12-12 NOTE — TELEPHONE ENCOUNTER
Left detailed voicemail for Umberto requesting a return call to clinic with her specifics questions if I do not answer she may leave them on my voicemail as I provided her with my direct line and that way I can better assist her when I do call back. Also provided her with some basic prep instructions for the EMG in case her question was based on this:      Eat your normal meal on the day of the test and continue any medication you are taking unless otherwise instructed. Take a shower or bath before your exam in order to remove oils from your skin. Do not apply creams/ lotions/ oils on hands, arms, legs and feet. Remove all jewelry (rings or bracelets) and wrist watch.

## 2019-12-12 NOTE — TELEPHONE ENCOUNTER
----- Message from Ericia Alfredo sent at 12/12/2019  2:34 PM EST -----  Contact: [phone]  Zack Clales left a vm asking for a call back. Pt states she has a few questions about her nerve conduction test for tomorrow. Please advise.

## 2019-12-13 ENCOUNTER — HOSPITAL ENCOUNTER (OUTPATIENT)
Dept: NEUROLOGY | Facility: HOSPITAL | Age: 44
Discharge: HOME | End: 2019-12-13
Admitting: PHYSICIAN ASSISTANT

## 2019-12-13 DIAGNOSIS — R79.89 LOW SERUM CORTISOL LEVEL: Primary | ICD-10-CM

## 2019-12-13 DIAGNOSIS — R79.89 LOW SERUM ADRENOCORTICOTROPHIC HORMONE (ACTH): ICD-10-CM

## 2019-12-13 DIAGNOSIS — R53.81 MALAISE: ICD-10-CM

## 2019-12-13 DIAGNOSIS — M06.4 INFLAMMATORY POLYARTHROPATHY (HCC): ICD-10-CM

## 2019-12-13 DIAGNOSIS — R82.998 LOW URINARY CORTISOL: ICD-10-CM

## 2019-12-13 DIAGNOSIS — R53.82 CHRONIC FATIGUE: ICD-10-CM

## 2019-12-13 PROBLEM — E27.40 ADRENAL INSUFFICIENCY: Status: ACTIVE | Noted: 2019-12-13

## 2019-12-13 PROCEDURE — 95911 NRV CNDJ TEST 9-10 STUDIES: CPT

## 2019-12-13 PROCEDURE — 95886 MUSC TEST DONE W/N TEST COMP: CPT

## 2019-12-23 ENCOUNTER — OFFICE VISIT (OUTPATIENT)
Dept: ENDOCRINOLOGY | Facility: CLINIC | Age: 44
End: 2019-12-23

## 2019-12-23 VITALS
HEART RATE: 97 BPM | SYSTOLIC BLOOD PRESSURE: 122 MMHG | DIASTOLIC BLOOD PRESSURE: 70 MMHG | HEIGHT: 61 IN | WEIGHT: 151.8 LBS | OXYGEN SATURATION: 99 % | BODY MASS INDEX: 28.66 KG/M2

## 2019-12-23 DIAGNOSIS — R53.83 FATIGUE, UNSPECIFIED TYPE: ICD-10-CM

## 2019-12-23 DIAGNOSIS — E27.40 ADRENAL INSUFFICIENCY (HCC): Primary | ICD-10-CM

## 2019-12-23 LAB
ESTRADIOL SERPL HS-MCNC: <5 PG/ML
FSH SERPL-ACNC: 82 MIU/ML
LH SERPL-ACNC: 48 MIU/ML
PROLACTIN SERPL-MCNC: 11.8 NG/ML (ref 4.79–23.3)
T4 FREE SERPL-MCNC: 1.06 NG/DL (ref 0.93–1.7)
TSH SERPL DL<=0.05 MIU/L-ACNC: 1.98 UIU/ML (ref 0.27–4.2)

## 2019-12-23 PROCEDURE — 84146 ASSAY OF PROLACTIN: CPT | Performed by: INTERNAL MEDICINE

## 2019-12-23 PROCEDURE — 83001 ASSAY OF GONADOTROPIN (FSH): CPT | Performed by: INTERNAL MEDICINE

## 2019-12-23 PROCEDURE — 82670 ASSAY OF TOTAL ESTRADIOL: CPT | Performed by: INTERNAL MEDICINE

## 2019-12-23 PROCEDURE — 84439 ASSAY OF FREE THYROXINE: CPT | Performed by: INTERNAL MEDICINE

## 2019-12-23 PROCEDURE — 99243 OFF/OP CNSLTJ NEW/EST LOW 30: CPT | Performed by: INTERNAL MEDICINE

## 2019-12-23 PROCEDURE — 84443 ASSAY THYROID STIM HORMONE: CPT | Performed by: INTERNAL MEDICINE

## 2019-12-23 PROCEDURE — 83002 ASSAY OF GONADOTROPIN (LH): CPT | Performed by: INTERNAL MEDICINE

## 2019-12-23 PROCEDURE — 84305 ASSAY OF SOMATOMEDIN: CPT | Performed by: INTERNAL MEDICINE

## 2019-12-23 RX ORDER — PREDNISONE 1 MG/1
5 TABLET ORAL DAILY
Qty: 30 TABLET | Refills: 5 | Status: SHIPPED | OUTPATIENT
Start: 2019-12-23 | End: 2020-03-24

## 2019-12-23 NOTE — PROGRESS NOTES
Chief Complaint   Patient presents with   • Establish Care   • Low serum cortisol level     referred by Dr. SOUMYA Ulloa   • Low ACTH level   • Fatigue, Malaice   • Low Urinary Cortisol and Inflammatory Polyarthropathy        New patient who is being seen in consultation regarding low serum cortisol, concern for adrenal insufficiency at the request of Sharyn Ulloa, *     HPI   Umberto Oliva is a 44 y.o. female who presents with serum cortisol and low ACTH.    Patient presents for evaluation regarding low serum cortisol and low ACTH after these values were obtained and abnormal during her primary care provider's evaluation of ongoing fatigue.  Patient does report that she was taking steroids at the time of her evaluation.Patient states that she was given a tenative diagnosis of rheumatoid arthritis given ongoing polyarthropathy for the past 3 years. She has been on various immunotherapies for this which has resulted in variability of her symptoms.  She reports that currently she is only taking prednisone for her joint pains.  She does report that she has side effects related to the joint pain which she believes is due to increased sugar as she notices increased dry mouth and sweating when she is taking prednisone.  She reports that she was first given oral steroids approximately 3 years ago when her joint pain began.  She reports that the dose and timing of her steroids has varied over the years, however, most commonly she reports that she would take between 5 to 10 mg daily for a period of 3 to 4 weeks and then stop.  She states that generally she would be off steroids for 1 to 2 weeks before having to resume them.  She states that this is been the case for the last few years.  Patient is currently taking prednisone 10 mg daily and occasionally takes extra 5 mg for total of 50 mg.  She reports continued fatigue and joint pain despite this dosing.  She reports that she saw her rheumatologist last week and that  the plan of care regarding her joint pain and arthropathy is somewhat unclear.  She does report that she would like to stop steroids, if possible.  However, her symptoms in her joints are debilitating at times make it difficult for her to function.  Patient reports the previous thyroid function testing has been normal.  She reports intermittent constipation and some fluctuation of weight but otherwise no concerning changes.  She reports she had a hysterectomy and uterectomy approximately 7 years ago and thus no longer has menstrual cycles.  She denies any increased thirst or increased urination.    Past Medical History:   Diagnosis Date   • Arthritis    • Endometriosis    • Headache    • Kidney stone    • Kidney stones    • Migraines    • Ovarian cyst    • Rheumatoid arthritis (CMS/HCC)      Past Surgical History:   Procedure Laterality Date   • COLON SURGERY      BOWEL BLOCKAGE  AGE 15   • DIAGNOSTIC LAPAROSCOPY      TIMES 4   • EXPLORATORY LAPAROTOMY  2006   • HYSTERECTOMY  07/24/2012    [facility]      Family History   Problem Relation Age of Onset   • Osteoarthritis Mother    • Kidney disease Mother    • Migraines Mother    • Thyroid disease Mother    • Hypertension Father    • Diabetes Paternal Grandmother    • Diabetes Maternal Grandfather    • Kidney disease Maternal Grandfather    • Stroke Maternal Grandfather    • Stroke Maternal Grandmother       Social History     Socioeconomic History   • Marital status:      Spouse name: Not on file   • Number of children: Not on file   • Years of education: Not on file   • Highest education level: Not on file   Tobacco Use   • Smoking status: Never Smoker   • Smokeless tobacco: Never Used   Substance and Sexual Activity   • Alcohol use: No   • Drug use: No   • Sexual activity: Yes     Partners: Male     Birth control/protection: Surgical      No Known Allergies   Current Outpatient Medications on File Prior to Visit   Medication Sig Dispense Refill   • ibuprofen  "(ADVIL,MOTRIN) 800 MG tablet Take 800 mg by mouth As Needed.  5   • SUMAtriptan (IMITREX) 100 MG tablet Take 1 tablet by mouth 1 (One) Time As Needed for Migraine (may repeat once in 2 hours if needed) for up to 1 dose. 9 tablet 5   • [DISCONTINUED] predniSONE (DELTASONE) 10 MG tablet Take 10 mg by mouth Daily.     • [DISCONTINUED] predniSONE (DELTASONE) 5 MG tablet Take 2 tablets by mouth Daily As Needed (for). (Patient taking differently: Take 5 mg by mouth Daily.) 30 tablet 1   • [DISCONTINUED] baclofen (LIORESAL) 20 MG tablet 20 mg.     • [DISCONTINUED] Diclofenac Potassium 50 MG pack Take  by mouth.     • [DISCONTINUED] promethazine-dextromethorphan (PROMETHAZINE-DM) 6.25-15 MG/5ML syrup Take 2.5 mL by mouth 4 (Four) Times a Day As Needed for Cough. May increase to 5 mL if needed 118 mL 0     No current facility-administered medications on file prior to visit.         Review of Systems   Constitutional: Positive for fatigue and unexpected weight gain.   HENT: Negative for tinnitus and voice change.    Eyes: Negative for itching and visual disturbance.   Cardiovascular: Positive for chest pain. Negative for palpitations.   Gastrointestinal: Positive for nausea. Negative for abdominal pain.   Endocrine: Positive for cold intolerance and heat intolerance.   Musculoskeletal: Positive for arthralgias and myalgias.   Skin: Negative for dry skin and rash.   Neurological: Positive for headache. Negative for tremors.   Psychiatric/Behavioral: Negative for depressed mood. The patient is not nervous/anxious.        Vitals:    12/23/19 1036   BP: 122/70   Pulse: 97   SpO2: 99%   Weight: 68.9 kg (151 lb 12.8 oz)   Height: 155.6 cm (61.26\")   Body mass index is 28.44 kg/m².     Physical Exam   Constitutional: Vital signs are normal. She appears well-developed and well-nourished. She is cooperative.   HENT:   Head: Normocephalic and atraumatic.   Mouth/Throat: Oropharynx is clear and moist and mucous membranes are normal. "   Eyes: Conjunctivae and EOM are normal.   Neck: Trachea normal. No thyromegaly present.   Cardiovascular: Normal rate and regular rhythm.   No murmur heard.  Pulmonary/Chest: Effort normal and breath sounds normal. No respiratory distress.   Abdominal: Soft. Bowel sounds are normal. She exhibits no distension. There is no hepatosplenomegaly. There is no tenderness.   Lymphadenopathy:        Head (right side): No submandibular adenopathy present.        Head (left side): No submandibular adenopathy present.     She has no cervical adenopathy.   Neurological: She is alert. She has normal strength and normal reflexes.   Skin: Skin is warm, dry and intact. No rash noted.   Psychiatric: She has a normal mood and affect. Her behavior is normal.          Labs/Imaging  Results for UMBERTO SWARTZ (MRN [id number]) as of 12/23/2019 11:56   Ref. Range 11/27/2019 12:40 11/27/2019 13:18 11/29/2019 11:22 12/11/2019 08:37   ACTH Latest Ref Range: 7.2 - 63.3 pg/mL    <1.1 (L)   Cortisol Latest Units: ug/dL 5.43   0.6   TSH Baseline Latest Ref Range: 0.270 - 4.200 uIU/mL 1.040          Assessment and Plan    Umberto was seen today for establish care, low serum cortisol level, low acth level, fatigue, malaice and low urinary cortisol and inflammatory polyarthropathy.    Diagnoses and all orders for this visit:    Adrenal insufficiency (CMS/HCC)  -Patient with AM cortisol of 0.6 with concurrent undetectable serum ACTH on labs obtained 8:37 in the morning on December 11.  -She reports that she has been taking steroids more often than not over the course of the last 3 years and that she was taking prednisone at the time his labs were drawn.  -Patient likely has iatrogenic adrenal insufficiency secondary to chronic steroid use at supraphysiologic doses.  -For The past week patient has been taking at least prednisone 10 mg daily but on some days does take up to 15 mg daily on the recommendation of her PCP.  -Unable to perform ACTH  stimulation secondary to current prednisone use.  -Patient reports that she is not clear with the plan for her rheumatologic diseases and whether or not she will require long-term steroids.  -Discussed reducing patient's prednisone dose to 5 mg daily which is a physiologic steroid dose.  -Discussed that since it is unclear if patient's adrenal glands would be able to appropriately respond with increased cortisol production during times of stress that she follow sick day rules and take extra steroids when she is ill or at other times of physiologic stress.  -Patient was instructed that when she is ill she should take at least double her usual steroid dose, prednisone 10 mg daily, for at least 3 days or until her illness resolves.  -She was also instructed that she will require increase steroid dosing during times of physiologic stress, such as surgery.  -Patient was counseled on the potential health risks of adrenal insufficiency including hemodynamic instability, death.  -Patient was counseled on the signs and symptoms of adrenal crisis.  She was counseled that if she is not able to tolerate her oral steroid dosing she would need to proceed to the ER for IV steroids.  -Discussed with the patient that her steroids cannot be discontinued completely until she has passed ACTH stimulation.  -Serum ACTH is likely secondary to steroid use, however, cannot rule out pituitary pathology.  -History is not overtly concerning for either pituitary hormonal deficiencies, however will screen for such, as below.  -Patient to discuss plan for steroids with her rheumatology provider, if able based on this treatment plan, will plan to slowly taper steroids and perform ACTH stimulation in the future.  -     TSH  -     T4, Free  -     Prolactin  -     Insulin-like Growth Factor  -     Estradiol  -     Follicle Stimulating Hormone  -     Luteinizing Hormone  -     predniSONE (DELTASONE) 5 MG tablet; Take 1 tablet by mouth  Daily.    Fatigue  -Reports persistence of symptoms despite taking prednisone 10 to 15 mg daily.  Discussed with patient this is a supraphysiologic dose of steroids and that the lack of resolution of her fatigue in the setting of the steroid dose excludes adrenal insufficiency as the cause of her symptoms.  -Previous TSH was checked and normal, will repeat TSH free T4 today.  If these laboratory values are normal, endocrine etiologies of her fatigue will have been ruled out.         Return in about 3 months (around 3/23/2020) for Next scheduled follow up. The patient was instructed to contact the clinic with any interval questions or concerns.    Venita Durán MD     Please note that portions of this document were completed using a voice recognition program. Efforts were made to edit the dictations, but occasionally words are mis-transcribed.

## 2019-12-27 LAB — IGF-I SERPL-MCNC: 197 NG/ML (ref 62–204)

## 2019-12-30 ENCOUNTER — TELEPHONE (OUTPATIENT)
Dept: ENDOCRINOLOGY | Facility: CLINIC | Age: 44
End: 2019-12-30

## 2019-12-30 NOTE — TELEPHONE ENCOUNTER
----- Message from Venita Durán MD sent at 12/30/2019  8:36 AM EST -----  See [facility]

## 2019-12-30 NOTE — TELEPHONE ENCOUNTER
----- Message from Venita Durán MD sent at 12/30/2019  3:24 PM EST -----  The patient's does not have Addisons disease, which is a specific, autoimmune cause of adrenal insufficiency. The patient's presumptive adrenal insufficiency suppression from prior corticosteroid use.    Also, estrogen is the only hormone which was low. I am not aware of a direct association with this and joint pain but would encourage her to discuss this with her GYN or rheumatologist.

## 2019-12-30 NOTE — TELEPHONE ENCOUNTER
Informed patient of lab results.  Patient voiced understanding but had some questions for Dr. Durán    1.  Does patient have [place] Disease?    2.  If one has low hormone levels, can this cause aches in feet and joints?    Dr. Durán, please advice.  Thank you.

## 2019-12-30 NOTE — TELEPHONE ENCOUNTER
Informed patient of lab results.  Patient voiced understanding but had some questions for Dr. Durán    1.  Does patient have [place] Disease?    2.  If one has low hormone levels, can this cause aches in feet and joints?    Dr. Durán, please advice.  Thank you.      Closing duplicate message.

## 2020-01-08 ENCOUNTER — TELEPHONE (OUTPATIENT)
Dept: INTERNAL MEDICINE | Facility: CLINIC | Age: 45
End: 2020-01-08

## 2020-01-08 NOTE — TELEPHONE ENCOUNTER
Dr. Capps called and would like to speak with you (only you, refused to speak with another provider that was in today) in regards to patient. His nurse will be doing infusions so please call his cell phone at [phone]. Thanks.

## 2020-01-09 DIAGNOSIS — G89.4 CHRONIC PAIN SYNDROME: Primary | ICD-10-CM

## 2020-01-30 DIAGNOSIS — E27.40 ADRENAL INSUFFICIENCY (HCC): Primary | ICD-10-CM

## 2020-01-31 ENCOUNTER — LAB (OUTPATIENT)
Dept: ENDOCRINOLOGY | Facility: CLINIC | Age: 45
End: 2020-01-31

## 2020-01-31 DIAGNOSIS — E27.40 ADRENAL INSUFFICIENCY (HCC): ICD-10-CM

## 2020-01-31 LAB
CORTIS SERPL-MCNC: 14.71 MCG/DL
CORTIS SERPL-MCNC: 15.72 MCG/DL
CORTIS SERPL-MCNC: 8.21 MCG/DL

## 2020-01-31 PROCEDURE — 82533 TOTAL CORTISOL: CPT | Performed by: INTERNAL MEDICINE

## 2020-01-31 PROCEDURE — 96372 THER/PROPH/DIAG INJ SC/IM: CPT | Performed by: INTERNAL MEDICINE

## 2020-01-31 RX ADMIN — COSYNTROPIN 0.25 MG: 0.25 INJECTION, POWDER, FOR SOLUTION INTRAMUSCULAR; INTRAVENOUS at 16:27

## 2020-02-01 ENCOUNTER — OFFICE VISIT (OUTPATIENT)
Dept: INTERNAL MEDICINE | Facility: CLINIC | Age: 45
End: 2020-02-01

## 2020-02-01 VITALS
OXYGEN SATURATION: 100 % | SYSTOLIC BLOOD PRESSURE: 134 MMHG | HEIGHT: 61 IN | WEIGHT: 149 LBS | TEMPERATURE: 97.5 F | RESPIRATION RATE: 15 BRPM | DIASTOLIC BLOOD PRESSURE: 84 MMHG | BODY MASS INDEX: 28.13 KG/M2 | HEART RATE: 129 BPM

## 2020-02-01 DIAGNOSIS — M79.671 PAIN IN BOTH FEET: Primary | ICD-10-CM

## 2020-02-01 DIAGNOSIS — M79.672 PAIN IN BOTH FEET: Primary | ICD-10-CM

## 2020-02-01 DIAGNOSIS — R60.0 EDEMA OF BOTH LOWER EXTREMITIES: ICD-10-CM

## 2020-02-01 DIAGNOSIS — R21 RASH/SKIN ERUPTION: ICD-10-CM

## 2020-02-01 PROCEDURE — 99214 OFFICE O/P EST MOD 30 MIN: CPT | Performed by: NURSE PRACTITIONER

## 2020-02-01 RX ORDER — DOXYCYCLINE 100 MG/1
100 TABLET ORAL 2 TIMES DAILY
Qty: 20 TABLET | Refills: 0 | Status: SHIPPED | OUTPATIENT
Start: 2020-02-01 | End: 2020-02-11

## 2020-02-01 RX ORDER — HYDROCODONE BITARTRATE AND ACETAMINOPHEN 5; 325 MG/1; MG/1
1 TABLET ORAL EVERY 4 HOURS PRN
Qty: 18 TABLET | Refills: 0 | Status: SHIPPED | OUTPATIENT
Start: 2020-02-01 | End: 2020-03-24

## 2020-02-03 RX ORDER — HYDROCORTISONE 10 MG/1
10 TABLET ORAL DAILY
Qty: 30 TABLET | Refills: 5 | Status: SHIPPED | OUTPATIENT
Start: 2020-02-03 | End: 2020-03-24

## 2020-02-06 ENCOUNTER — TELEPHONE (OUTPATIENT)
Dept: INTERNAL MEDICINE | Facility: CLINIC | Age: 45
End: 2020-02-06

## 2020-02-06 DIAGNOSIS — R53.82 CHRONIC FATIGUE: Primary | ICD-10-CM

## 2020-02-06 NOTE — TELEPHONE ENCOUNTER
Patient called in stating that her and Dr. Laila simmonsed having blood work done and discussing her xray from pervious providers. Patient would like blood work ordered. Patient has appointment tomorrow but is late, PLEASE CALL PATIENT REGARDING BLOOD WORK TO SEE IF SHE COULD COME IN  EARLY. [phone]

## 2020-02-07 ENCOUNTER — OFFICE VISIT (OUTPATIENT)
Dept: INTERNAL MEDICINE | Facility: CLINIC | Age: 45
End: 2020-02-07

## 2020-02-07 ENCOUNTER — HOSPITAL ENCOUNTER (OUTPATIENT)
Dept: GENERAL RADIOLOGY | Facility: HOSPITAL | Age: 45
Discharge: HOME | End: 2020-02-07
Admitting: PHYSICIAN ASSISTANT

## 2020-02-07 VITALS
WEIGHT: 149 LBS | OXYGEN SATURATION: 97 % | BODY MASS INDEX: 28.13 KG/M2 | SYSTOLIC BLOOD PRESSURE: 128 MMHG | TEMPERATURE: 98.8 F | HEIGHT: 61 IN | DIASTOLIC BLOOD PRESSURE: 72 MMHG | HEART RATE: 90 BPM

## 2020-02-07 DIAGNOSIS — Z12.31 SCREENING MAMMOGRAM, ENCOUNTER FOR: ICD-10-CM

## 2020-02-07 DIAGNOSIS — R93.89 ABNORMAL FINDING OF DIAGNOSTIC IMAGING: ICD-10-CM

## 2020-02-07 DIAGNOSIS — M06.4 INFLAMMATORY POLYARTHROPATHY (HCC): ICD-10-CM

## 2020-02-07 DIAGNOSIS — R10.9 RIGHT FLANK PAIN: ICD-10-CM

## 2020-02-07 DIAGNOSIS — Z00.00 ANNUAL PHYSICAL EXAM: Primary | ICD-10-CM

## 2020-02-07 DIAGNOSIS — R60.0 BILATERAL LEG EDEMA: ICD-10-CM

## 2020-02-07 LAB
ALBUMIN SERPL-MCNC: 4.2 G/DL (ref 3.5–5.2)
ALBUMIN/GLOB SERPL: 1.5 G/DL
ALP SERPL-CCNC: 103 U/L (ref 39–117)
ALT SERPL-CCNC: 13 U/L (ref 1–33)
AST SERPL-CCNC: 21 U/L (ref 1–32)
BASOPHILS # BLD AUTO: ABNORMAL 10*3/UL
BASOPHILS # BLD MANUAL: 0.11 10*3/MM3 (ref 0–0.2)
BASOPHILS NFR BLD MANUAL: 1 % (ref 0–1.5)
BILIRUB BLD-MCNC: NEGATIVE MG/DL
BILIRUB SERPL-MCNC: <0.2 MG/DL (ref 0.2–1.2)
BUN SERPL-MCNC: 14 MG/DL (ref 6–20)
BUN/CREAT SERPL: 24.1 (ref 7–25)
CALCIUM SERPL-MCNC: 9.1 MG/DL (ref 8.6–10.5)
CHLORIDE SERPL-SCNC: 101 MMOL/L (ref 98–107)
CLARITY, POC: CLEAR
CO2 SERPL-SCNC: 24.9 MMOL/L (ref 22–29)
COLOR UR: YELLOW
CREAT SERPL-MCNC: 0.58 MG/DL (ref 0.57–1)
DIFFERENTIAL COMMENT: ABNORMAL
EOSINOPHIL # BLD AUTO: ABNORMAL 10*3/UL
EOSINOPHIL # BLD MANUAL: 0.22 10*3/MM3 (ref 0–0.4)
EOSINOPHIL NFR BLD AUTO: ABNORMAL %
EOSINOPHIL NFR BLD MANUAL: 2 % (ref 0.3–6.2)
ERYTHROCYTE [DISTWIDTH] IN BLOOD BY AUTOMATED COUNT: 12.4 % (ref 12.3–15.4)
GLOBULIN SER CALC-MCNC: 2.8 GM/DL
GLUCOSE SERPL-MCNC: 90 MG/DL (ref 65–99)
GLUCOSE UR STRIP-MCNC: NEGATIVE MG/DL
HCT VFR BLD AUTO: 34.2 % (ref 34–46.6)
HGB BLD-MCNC: 11.2 G/DL (ref 12–15.9)
KETONES UR QL: NEGATIVE
LEUKOCYTE EST, POC: NEGATIVE
LYMPHOCYTES # BLD AUTO: ABNORMAL 10*3/UL
LYMPHOCYTES # BLD MANUAL: 4.08 10*3/MM3 (ref 0.7–3.1)
LYMPHOCYTES NFR BLD AUTO: ABNORMAL %
LYMPHOCYTES NFR BLD MANUAL: 37.4 % (ref 19.6–45.3)
MCH RBC QN AUTO: 27.3 PG (ref 26.6–33)
MCHC RBC AUTO-ENTMCNC: 32.7 G/DL (ref 31.5–35.7)
MCV RBC AUTO: 83.2 FL (ref 79–97)
MONOCYTES # BLD MANUAL: 0.99 10*3/MM3 (ref 0.1–0.9)
MONOCYTES NFR BLD AUTO: ABNORMAL %
MONOCYTES NFR BLD MANUAL: 9.1 % (ref 5–12)
NEUTROPHILS # BLD MANUAL: 5.51 10*3/MM3 (ref 1.7–7)
NEUTROPHILS NFR BLD AUTO: ABNORMAL %
NEUTROPHILS NFR BLD MANUAL: 50.5 % (ref 42.7–76)
NITRITE UR-MCNC: NEGATIVE MG/ML
PH UR: 6 [PH] (ref 5–8)
PLATELET # BLD AUTO: 444 10*3/MM3 (ref 140–450)
PLATELET BLD QL SMEAR: ABNORMAL
POTASSIUM SERPL-SCNC: 4.2 MMOL/L (ref 3.5–5.2)
PROT SERPL-MCNC: 7 G/DL (ref 6–8.5)
PROT UR STRIP-MCNC: NEGATIVE MG/DL
RBC # BLD AUTO: 4.11 10*6/MM3 (ref 3.77–5.28)
RBC # UR STRIP: NEGATIVE /UL
RBC MORPH BLD: ABNORMAL
SODIUM SERPL-SCNC: 139 MMOL/L (ref 136–145)
SP GR UR: 1.02 (ref 1–1.03)
UROBILINOGEN UR QL: NORMAL
WBC # BLD AUTO: 10.92 10*3/MM3 (ref 3.4–10.8)

## 2020-02-07 PROCEDURE — 99396 PREV VISIT EST AGE 40-64: CPT | Performed by: PHYSICIAN ASSISTANT

## 2020-02-07 PROCEDURE — 81003 URINALYSIS AUTO W/O SCOPE: CPT | Performed by: PHYSICIAN ASSISTANT

## 2020-02-07 PROCEDURE — 72100 X-RAY EXAM L-S SPINE 2/3 VWS: CPT

## 2020-02-07 RX ORDER — CYANOCOBALAMIN (VITAMIN B-12) 1000 MCG
1000 TABLET ORAL
COMMUNITY
End: 2020-07-27

## 2020-02-07 RX ORDER — POTASSIUM CHLORIDE 750 MG/1
10 TABLET, FILM COATED, EXTENDED RELEASE ORAL DAILY PRN
Qty: 30 TABLET | Refills: 1 | Status: SHIPPED | OUTPATIENT
Start: 2020-02-07 | End: 2020-03-24

## 2020-02-07 RX ORDER — FUROSEMIDE 20 MG/1
20 TABLET ORAL DAILY PRN
Qty: 30 TABLET | Refills: 1 | Status: SHIPPED | OUTPATIENT
Start: 2020-02-07 | End: 2020-03-24

## 2020-02-07 NOTE — PROGRESS NOTES
Subjective   Umberto Oliva is a 45 y.o. female and is here for a comprehensive physical exam. The patient reports problems - ongoing joint issues and swelling.    HPI:  Abnormal edema and pain of the left foot and ankle for nearly 3 weeks. She began doxycycline 5 days ago for presumed cellulitis which has not seemed to help any.  She tried taking an herbal diuretic for a few days which did not help so she stopped it as of this morning. She has had suggested diagnoses including inflammatory arthritis, psoriatic arthritis and vasculitis. She has tried and failed many treatments. She will establish care with pain management on . She will also be evaluated at [facility] on .    She has been taking Prednisone 5 mg daily for around 5 weeks. Due to increased swelling in the left ankle she contacted endocrinology 1 week ago and she was asked to stop in to have cortisol rechecked the following day which had not changed significantly.     Today she reports right flank pain intermittently in the last couple of weeks. The pain is very bothersome when laying in bed and with some movements. The pain is an ache and sometimes sharp. She saw a chiropractor monday who did an x-ray which had an abnormality which appeared to be in the right quadratus lumborum. No fever, chills, nausea, diarrhea, low back pain, lower abdominal pain, dysuria, hematuria, increased frequency or urgency.           Do you take any herbs or supplements that were not prescribed by a doctor? yes, B12  Are you taking calcium supplements? No  Are you taking aspirin daily? No     History:  LMP: No LMP recorded. Patient has had a hysterectomy.  Menopause: Yes, surgical  No longer indicated.   Family history of breast or ovarian cancer: yes, maternal great aunts had breast cancer.         OB History    Para Term  AB Living   2 2       3   SAB TAB Ectopic Molar Multiple Live Births           1        # Outcome Date GA Lbr Shaquille/2nd Weight  Sex Delivery Anes PTL Lv   2 Para            1 Para               reports that she currently engages in sexual activity and has had partner(s) who are Male. She reports using the following method of birth control/protection: Surgical.        The following portions of the patient's history were reviewed and updated as appropriate: She  has a past medical history of Arthritis, Endometriosis, Headache, Kidney stone, Kidney stones, Migraines, Ovarian cyst, and Rheumatoid arthritis (CMS/HCC).  She does not have any pertinent problems on file.  She  has a past surgical history that includes Hysterectomy (07/24/2012); Diagnostic laparoscopy; Exploratory laparotomy (2006); and Colon surgery.  Her family history includes Diabetes in her maternal grandfather and paternal grandmother; Hypertension in her father; Kidney disease in her maternal grandfather and mother; Migraines in her mother; Osteoarthritis in her mother; Stroke in her maternal grandfather and maternal grandmother; Thyroid disease in her mother.  She  reports that she has never smoked. She has never used smokeless tobacco. She reports that she does not drink alcohol or use drugs.  Current Outpatient Medications   Medication Sig Dispense Refill   • Cyanocobalamin (B-12) 1000 MCG tablet Take 1,000 mcg by mouth.     • doxycycline (ADOXA) 100 MG tablet Take 1 tablet by mouth 2 (Two) Times a Day for 10 days. 20 tablet 0   • HYDROcodone-acetaminophen (NORCO) 5-325 MG per tablet Take 1 tablet by mouth Every 4 (Four) Hours As Needed for Severe Pain . 18 tablet 0   • hydrocortisone (CORTEF) 10 MG tablet Take 1 tablet by mouth Daily. 30 tablet 5   • ibuprofen (ADVIL,MOTRIN) 800 MG tablet Take 800 mg by mouth As Needed.  5   • predniSONE (DELTASONE) 5 MG tablet Take 1 tablet by mouth Daily. 30 tablet 5   • SUMAtriptan (IMITREX) 100 MG tablet Take 1 tablet by mouth 1 (One) Time As Needed for Migraine (may repeat once in 2 hours if needed) for up to 1 dose. 9 tablet 5   •  furosemide (LASIX) 20 MG tablet Take 1 tablet by mouth Daily As Needed (swelling). 30 tablet 1   • potassium chloride (K-DUR) 10 MEQ CR tablet Take 1 tablet by mouth Daily As Needed (with lasix). 30 tablet 1     No current facility-administered medications for this visit.        Review of Systems  Do you have pain that bothers you in your daily life? yes    Review of Systems   Constitutional: Positive for fatigue. Negative for appetite change, chills, fever and unexpected weight change.   HENT: Negative for congestion, ear pain, hearing loss, nosebleeds, postnasal drip, rhinorrhea, sore throat, tinnitus and trouble swallowing.    Eyes: Negative for photophobia, discharge and visual disturbance.   Respiratory: Negative for cough, chest tightness, shortness of breath and wheezing.    Cardiovascular: Negative for chest pain, palpitations and leg swelling.        Left upper chest wall discomfort.   Gastrointestinal: Negative for abdominal distention, abdominal pain, blood in stool, constipation, diarrhea, nausea and vomiting.   Endocrine: Negative for cold intolerance, heat intolerance, polydipsia, polyphagia and polyuria.   Genitourinary: Positive for flank pain (right). Negative for decreased urine volume, difficulty urinating, dysuria, genital sores, pelvic pain and vaginal discharge.   Musculoskeletal: Positive for arthralgias, joint swelling and myalgias. Negative for back pain, gait problem, neck pain and neck stiffness.   Skin: Positive for color change. Negative for pallor, rash and wound.   Allergic/Immunologic: Negative for environmental allergies, food allergies and immunocompromised state.   Neurological: Negative for dizziness, tremors, seizures, weakness, numbness and headaches.   Hematological: Negative for adenopathy. Does not bruise/bleed easily.   Psychiatric/Behavioral: Positive for sleep disturbance. Negative for agitation, behavioral problems, confusion, hallucinations, self-injury and suicidal  "ideas. The patient is not nervous/anxious.          Objective   /72   Pulse 90   Temp 98.8 °F (37.1 °C)   Ht 154.9 cm (60.98\")   Wt 67.6 kg (149 lb)   SpO2 97%   BMI 28.17 kg/m²     Physical Exam   Constitutional: She is oriented to person, place, and time. She appears well-developed and well-nourished. No distress.   HENT:   Head: Normocephalic and atraumatic.   Right Ear: External ear normal.   Left Ear: External ear normal.   Nose: Nose normal.   Mouth/Throat: Oropharynx is clear and moist. No oropharyngeal exudate.   Eyes: Pupils are equal, round, and reactive to light. Conjunctivae and EOM are normal. No scleral icterus.   Neck: Normal range of motion. Neck supple. No thyromegaly present.   Cardiovascular: Normal rate, regular rhythm and normal heart sounds. Exam reveals no gallop and no friction rub.   No murmur heard.  Pulmonary/Chest: Effort normal and breath sounds normal. No respiratory distress. She has no wheezes. She has no rales. She exhibits no tenderness.   Abdominal: Soft. Bowel sounds are normal. She exhibits no distension and no mass. There is no tenderness. There is CVA tenderness (right). There is no rebound and no guarding. No hernia.   Musculoskeletal: Normal range of motion. She exhibits edema (bilateral ankles and left foot) and tenderness (bilateral lower legs and left foot. negative homans sign bilaterally.). She exhibits no deformity.   Lymphadenopathy:     She has no cervical adenopathy.   Neurological: She is alert and oriented to person, place, and time. She displays normal reflexes. No cranial nerve deficit or sensory deficit. She exhibits normal muscle tone. Coordination normal.   Skin: Skin is warm and dry. Capillary refill takes less than 2 seconds. Rash noted. She is not diaphoretic. No erythema. No pallor.        Psychiatric: She has a normal mood and affect. Her behavior is normal. Judgment and thought content normal.   Nursing note and vitals reviewed.         "   Assessment/Plan   Healthy female exam.     1.    Diagnosis Plan   1. Annual physical exam     2. BMI 28.0-28.9,adult  Patient's Body mass index is 28.17 kg/m². BMI is above normal parameters. Recommendations include: exercise counseling and nutrition counseling.     3. Screening mammogram, encounter for  Mammo Screening Digital Tomosynthesis Bilateral With CAD     4. Abnormal finding of diagnostic imaging  XR Spine Lumbar AP & Lateral     5. Right flank pain  XR Spine Lumbar AP & Lateral (recheck before considering more advanced imaging)      POC Urinalysis Dipstick, Automated - unremarkable     6. Bilateral leg edema  furosemide (LASIX) 20 MG tablet    potassium chloride (K-DUR) 10 MEQ CR tablet    Begin lasix daily x 5 days then use daily prn.        7.      Inflammatory polyarthropathy See pain management and [facility] as scheduled.          2. Patient Counseling:  --Nutrition: Stressed importance of moderation in sodium/caffeine intake, saturated fat and cholesterol, caloric balance, sufficient intake of fresh fruits, vegetables, fiber, calcium, iron.  --Discussed the issue of calcium supplement, and the daily use of baby aspirin if applicable.             --Mammogram recommended every 2 years from age 40-49 and yearly beginning at age 50.  --Exercise: Stressed the importance of regular exercise.   --Substance Abuse: Discussed cessation/primary prevention of tobacco (if applicable), alcohol, or other drug use (if applicable); driving or other dangerous activities under the influence; availability of treatment for abuse.    --Sexuality: Discussed sexually transmitted diseases, partner selection, use of condoms, avoidance of unintended pregnancy  and contraceptive alternatives.   --Injury prevention: Discussed safety belts, safety helmets, smoke detector, smoking near bedding or upholstery.   --Dental health: Discussed importance of regular tooth brushing, flossing, and dental visits every 6  months.  --Immunizations reviewed.  --Discussed benefits of screening colonoscopy (if applicable).  --After hours service discussed with patient.    3. Discussed the patient's BMI with her.  The BMI is above average; BMI management plan is completed  4. No follow-ups on file.         ARIELLE Hobbs  02/07/2020  2:28 PM

## 2020-02-11 ENCOUNTER — TELEPHONE (OUTPATIENT)
Dept: INTERNAL MEDICINE | Facility: CLINIC | Age: 45
End: 2020-02-11

## 2020-02-11 NOTE — TELEPHONE ENCOUNTER
Sharyn Ulloa PA Jackson, Emily             Yes.    Previous Messages      ----- Message -----   From: Magdalene Tena   Sent: 2/11/2020   8:19 AM EST   To: ARIELLE Hobbs   Subject: Patient to Schedule                               Patient told scheduling she does not want to do her mammogram at this time. Said she will schedule at her convenience. Can we close for now?

## 2020-02-12 NOTE — PROGRESS NOTES
Chief Complaint / Reason:      Chief Complaint   Patient presents with   • Edema     worse at hs       Subjective     HPI  Patient presents today with complaints of edema in lower extremities and discoloration.  She states the symptoms are worse at night and they are so tight feeling that it is causing itching and burning sensation.  She denies chest pain, shortness of breath or heart palpitations.  Patient appears nervous as she states that she does not know what is going on and she has been seen by multiple specialist which includeEndocrinology neurology rheumatology cardiology Ortho dermatology she has had multiple testing and tried different treatment regimens.  She has also been referred to pain specialist.  Patient states that she is scheduled with [facility] on 2/17.  Patient states that the symptoms have been new and very debilitating.  Symptoms started about a week ago and have progressively worsened over the past few days and is making it difficult to walk or carry out activities of daily living.  History taken from: patient    PMH/FH/Social History were reviewed and updated appropriately in the electronic medical record.   Past Medical History:   Diagnosis Date   • Arthritis    • Endometriosis    • Headache    • Kidney stone    • Kidney stones    • Migraines    • Ovarian cyst    • Rheumatoid arthritis (CMS/HCC)      Past Surgical History:   Procedure Laterality Date   • COLON SURGERY      BOWEL BLOCKAGE  AGE 15   • DIAGNOSTIC LAPAROSCOPY      TIMES 4   • EXPLORATORY LAPAROTOMY  2006   • HYSTERECTOMY  07/24/2012    [facility]     Social History     Socioeconomic History   • Marital status:      Spouse name: Not on file   • Number of children: Not on file   • Years of education: Not on file   • Highest education level: Not on file   Tobacco Use   • Smoking status: Never Smoker   • Smokeless tobacco: Never Used   Substance and Sexual Activity   • Alcohol use: No   • Drug use: No   • Sexual  activity: Yes     Partners: Male     Birth control/protection: Surgical     Family History   Problem Relation Age of Onset   • Osteoarthritis Mother    • Kidney disease Mother    • Migraines Mother    • Thyroid disease Mother    • Hypertension Father    • Diabetes Paternal Grandmother    • Diabetes Maternal Grandfather    • Kidney disease Maternal Grandfather    • Stroke Maternal Grandfather    • Stroke Maternal Grandmother        Review of Systems:   Review of Systems   Constitutional: Positive for fatigue. Negative for activity change and appetite change.   HENT: Negative for ear discharge, facial swelling, tinnitus, trouble swallowing and voice change.    Respiratory: Positive for chest tightness.    Cardiovascular: Positive for leg swelling. Negative for palpitations.   Musculoskeletal: Positive for arthralgias, back pain, gait problem, joint swelling and myalgias.   Skin: Positive for color change and rash. Negative for pallor.   Allergic/Immunologic: Positive for environmental allergies.   Neurological: Positive for weakness and numbness ( tingling).   Psychiatric/Behavioral: Positive for sleep disturbance, depressed mood and stress. The patient is nervous/anxious.          All other systems were reviewed and are negative.  Exceptions are noted in the subjective or above.      Objective     Vital Signs  Vitals:    02/01/20 1230   BP: 134/84   Pulse: (!) 129   Resp: 15   Temp: 97.5 °F (36.4 °C)   SpO2: 100%       Body mass index is 28.15 kg/m².    Physical Exam   Constitutional: She is oriented to person, place, and time. Vital signs are normal. She appears well-developed and well-nourished. She is cooperative.   HENT:   Head: Normocephalic and atraumatic.   Mouth/Throat: Mucous membranes are dry. Posterior oropharyngeal erythema present.   Eyes: Conjunctivae and EOM are normal.   Neck: Trachea normal. No thyromegaly present.   Cardiovascular: Regular rhythm, normal heart sounds and intact distal pulses.  Tachycardia present.   No murmur heard.  Pulmonary/Chest: Effort normal and breath sounds normal. No respiratory distress.   Abdominal: Soft. Bowel sounds are normal. She exhibits no distension. There is no hepatosplenomegaly. There is no tenderness.   Musculoskeletal: She exhibits edema and tenderness.        Right foot: There is bony tenderness and swelling.        Left foot: There is bony tenderness and swelling.   Lymphadenopathy:        Head (right side): No submandibular adenopathy present.        Head (left side): No submandibular adenopathy present.     She has no cervical adenopathy.   Neurological: She is alert and oriented to person, place, and time. She has normal strength and normal reflexes. Coordination normal.   Skin: Skin is warm, dry and intact. Capillary refill takes less than 2 seconds. Rash noted. There is erythema.   See photos   Psychiatric: Her behavior is normal. Judgment and thought content normal. Her mood appears anxious. She exhibits a depressed mood.   Nursing note and vitals reviewed.           Results Review:    I reviewed the patient's new clinical results.       Medication Review:     Current Outpatient Medications:   •  ibuprofen (ADVIL,MOTRIN) 800 MG tablet, Take 800 mg by mouth As Needed., Disp: , Rfl: 5  •  predniSONE (DELTASONE) 5 MG tablet, Take 1 tablet by mouth Daily., Disp: 30 tablet, Rfl: 5  •  SUMAtriptan (IMITREX) 100 MG tablet, Take 1 tablet by mouth 1 (One) Time As Needed for Migraine (may repeat once in 2 hours if needed) for up to 1 dose., Disp: 9 tablet, Rfl: 5  •  Cyanocobalamin (B-12) 1000 MCG tablet, Take 1,000 mcg by mouth., Disp: , Rfl:   •  furosemide (LASIX) 20 MG tablet, Take 1 tablet by mouth Daily As Needed (swelling)., Disp: 30 tablet, Rfl: 1  •  HYDROcodone-acetaminophen (NORCO) 5-325 MG per tablet, Take 1 tablet by mouth Every 4 (Four) Hours As Needed for Severe Pain ., Disp: 18 tablet, Rfl: 0  •  hydrocortisone (CORTEF) 10 MG tablet, Take 1 tablet by  mouth Daily., Disp: 30 tablet, Rfl: 5  •  potassium chloride (K-DUR) 10 MEQ CR tablet, Take 1 tablet by mouth Daily As Needed (with lasix)., Disp: 30 tablet, Rfl: 1    Assessment/Plan   Umberto was seen today for edema.    Diagnoses and all orders for this visit:    Pain in both feet  -     HYDROcodone-acetaminophen (NORCO) 5-325 MG per tablet; Take 1 tablet by mouth Every 4 (Four) Hours As Needed for Severe Pain .  Recommend patient follow-up with pain management along with other specialist.  Recommend soaking in Epson salt and hot water and elevate  Rash/skin eruption  -     doxycycline (ADOXA) 100 MG tablet; Take 1 tablet by mouth 2 (Two) Times a Day for 10 days.    Edema of both lower extremities    Discussed nonpharmacological interventions and advised patient to keep scheduled appointment with [facility].  Discussed worsening signs and symptoms along with differential diagnosis  Return if symptoms worsen or fail to improve.    Kym Charles, CHELI  02/01/2020

## 2020-02-20 ENCOUNTER — APPOINTMENT (OUTPATIENT)
Dept: GENERAL RADIOLOGY | Facility: HOSPITAL | Age: 45
End: 2020-02-20

## 2020-02-20 DIAGNOSIS — R60.0 EDEMA OF LEFT FOOT: ICD-10-CM

## 2020-02-20 DIAGNOSIS — M79.672 LEFT FOOT PAIN: Primary | ICD-10-CM

## 2020-03-11 ENCOUNTER — LAB (OUTPATIENT)
Dept: LAB | Facility: HOSPITAL | Age: 45
End: 2020-03-11

## 2020-03-11 ENCOUNTER — TRANSCRIBE ORDERS (OUTPATIENT)
Dept: LAB | Facility: HOSPITAL | Age: 45
End: 2020-03-11

## 2020-03-11 ENCOUNTER — HOSPITAL ENCOUNTER (OUTPATIENT)
Dept: GENERAL RADIOLOGY | Facility: HOSPITAL | Age: 45
Discharge: HOME | End: 2020-03-11
Admitting: SPECIALIST

## 2020-03-11 DIAGNOSIS — K76.9 LIVER DISEASE: Primary | ICD-10-CM

## 2020-03-11 DIAGNOSIS — Z79.899 ENCOUNTER FOR LONG-TERM (CURRENT) USE OF OTHER MEDICATIONS: ICD-10-CM

## 2020-03-11 DIAGNOSIS — R63.4 LOSS OF WEIGHT: ICD-10-CM

## 2020-03-11 DIAGNOSIS — R53.83 TIREDNESS: ICD-10-CM

## 2020-03-11 DIAGNOSIS — K76.9 LIVER DISEASE: ICD-10-CM

## 2020-03-11 LAB
ALBUMIN SERPL-MCNC: 3.8 G/DL (ref 3.5–5.2)
ALP SERPL-CCNC: 89 U/L (ref 39–117)
ALT SERPL W P-5'-P-CCNC: 9 U/L (ref 1–33)
AST SERPL-CCNC: 15 U/L (ref 1–32)
BASOPHILS # BLD AUTO: 0.09 10*3/MM3 (ref 0–0.2)
BASOPHILS NFR BLD AUTO: 1.1 % (ref 0–1.5)
BILIRUB CONJ SERPL-MCNC: <0.2 MG/DL (ref 0.2–0.3)
BILIRUB INDIRECT SERPL-MCNC: ABNORMAL MG/DL
BILIRUB SERPL-MCNC: 0.2 MG/DL (ref 0.2–1.2)
BUN BLD-MCNC: 10 MG/DL (ref 6–20)
CHOLEST SERPL-MCNC: 150 MG/DL (ref 0–200)
CREAT BLD-MCNC: 0.74 MG/DL (ref 0.57–1)
DEPRECATED RDW RBC AUTO: 36 FL (ref 37–54)
EOSINOPHIL # BLD AUTO: 0.04 10*3/MM3 (ref 0–0.4)
EOSINOPHIL NFR BLD AUTO: 0.5 % (ref 0.3–6.2)
ERYTHROCYTE [DISTWIDTH] IN BLOOD BY AUTOMATED COUNT: 12.6 % (ref 12.3–15.4)
GFR SERPL CREATININE-BSD FRML MDRD: 86 ML/MIN/1.73
HCT VFR BLD AUTO: 32.7 % (ref 34–46.6)
HGB BLD-MCNC: 11.1 G/DL (ref 12–15.9)
IMM GRANULOCYTES # BLD AUTO: 0.03 10*3/MM3 (ref 0–0.05)
IMM GRANULOCYTES NFR BLD AUTO: 0.4 % (ref 0–0.5)
LYMPHOCYTES # BLD AUTO: 3.25 10*3/MM3 (ref 0.7–3.1)
LYMPHOCYTES NFR BLD AUTO: 39.1 % (ref 19.6–45.3)
MAGNESIUM SERPL-MCNC: 2.2 MG/DL (ref 1.6–2.6)
MCH RBC QN AUTO: 27.4 PG (ref 26.6–33)
MCHC RBC AUTO-ENTMCNC: 33.9 G/DL (ref 31.5–35.7)
MCV RBC AUTO: 80.7 FL (ref 79–97)
MONOCYTES # BLD AUTO: 0.66 10*3/MM3 (ref 0.1–0.9)
MONOCYTES NFR BLD AUTO: 7.9 % (ref 5–12)
NEUTROPHILS # BLD AUTO: 4.24 10*3/MM3 (ref 1.7–7)
NEUTROPHILS NFR BLD AUTO: 51 % (ref 42.7–76)
NRBC BLD AUTO-RTO: 0 /100 WBC (ref 0–0.2)
PLATELET # BLD AUTO: 453 10*3/MM3 (ref 140–450)
PMV BLD AUTO: 8.9 FL (ref 6–12)
POTASSIUM BLD-SCNC: 4.3 MMOL/L (ref 3.5–5.2)
PROT SERPL-MCNC: 7.3 G/DL (ref 6–8.5)
RBC # BLD AUTO: 4.05 10*6/MM3 (ref 3.77–5.28)
TRIGL SERPL-MCNC: 74 MG/DL (ref 0–150)
URATE SERPL-MCNC: 5.4 MG/DL (ref 2.4–5.7)
WBC NRBC COR # BLD: 8.31 10*3/MM3 (ref 3.4–10.8)

## 2020-03-11 PROCEDURE — 82565 ASSAY OF CREATININE: CPT

## 2020-03-11 PROCEDURE — 83735 ASSAY OF MAGNESIUM: CPT

## 2020-03-11 PROCEDURE — 84132 ASSAY OF SERUM POTASSIUM: CPT

## 2020-03-11 PROCEDURE — 36415 COLL VENOUS BLD VENIPUNCTURE: CPT

## 2020-03-11 PROCEDURE — 84520 ASSAY OF UREA NITROGEN: CPT

## 2020-03-11 PROCEDURE — 84478 ASSAY OF TRIGLYCERIDES: CPT

## 2020-03-11 PROCEDURE — 73630 X-RAY EXAM OF FOOT: CPT

## 2020-03-11 PROCEDURE — 82465 ASSAY BLD/SERUM CHOLESTEROL: CPT

## 2020-03-11 PROCEDURE — 85025 COMPLETE CBC W/AUTO DIFF WBC: CPT | Performed by: SPECIALIST

## 2020-03-11 PROCEDURE — 80076 HEPATIC FUNCTION PANEL: CPT

## 2020-03-11 PROCEDURE — 84550 ASSAY OF BLOOD/URIC ACID: CPT

## 2020-03-19 ENCOUNTER — TELEPHONE (OUTPATIENT)
Dept: ENDOCRINOLOGY | Facility: CLINIC | Age: 45
End: 2020-03-19

## 2020-03-19 NOTE — TELEPHONE ENCOUNTER
Informed patient of Dr. Durán's advice.  Patient voiced understanding.    Patient will contact her doctor at the [facility] in order to obtain further information on dosage of steroid medication and the length she would be taking this medication, in order to provide Dr. Durán with more information and get her input.    Patient will call us back tomorrow, with more information.

## 2020-03-19 NOTE — TELEPHONE ENCOUNTER
HAD SOME HEALTH CONCERNS AND ISSUES AND HAD TO GO TO [facility]. HER PROVIDER THERE WANTS TO KNOW IF DR COTE WOULD CONSIDER GIVING HER A HIGH DOSE OF STEROID FOR ONE WEEK. SHE IS CURRENTLY TAKING A LOW DOSE BUT HER PROVIDER WITH [facility] FEELS SHE MAKE BENEFIT FROM A HIGHER DOSE FOR ONE WEEK. PATIENT DOES HAVE AN ADRENAL INSUFFIENCEY. IF SHE RESPONSES WELL WITH A HIGHER DOSE, THAT PROVIDER WILL KNOW THAT SHE IS DEALING WITH AN INFLAMMATION AND THE PROVIDER WILL KNOW WHICH TESTING TO PROCEED WITH. SHE IS TAKING HYDROCORTISONE NOW AND PROVIDER TOLD HER THE HIGHER DOSE STEROID DOESN'T HAVE TO BE PREDNISONE.       CALL BACK [phone]

## 2020-03-19 NOTE — TELEPHONE ENCOUNTER
Please advise the patient that if she is acutely ill she should take stress dose steroids as discussed at previous appointments.  If she needs refills on her current hydrocortisone due to recent stress dosing, I would be happy to send in refills.    Regarding a potential therapeutic trial of high-dose steroids, I would prefer the patient obtain this prescription from the provider requesting the therapeutic trial. I do not know what the provider at the [facility] would consider high dose, thus, it is most appropriate for them to provide the prescription/dosing instructions to ensure the trial is completed in the desired manner.

## 2020-03-20 ENCOUNTER — TELEPHONE (OUTPATIENT)
Dept: ENDOCRINOLOGY | Facility: CLINIC | Age: 45
End: 2020-03-20

## 2020-03-20 NOTE — TELEPHONE ENCOUNTER
Patient states she spoke with Dr. Buzz Javed at the [facility].  The plan is to get the patient on either or the following steroids, in order to see how her body reacts to the inflammation, swelling and pain patient is currently experiencing.    Medrol:  48 mg       Or     Prednisone:  60 mg      Plan would be to take (either of the two mentioned med's) and take them as follows:    Start taking whatever the dose might be (48 mg or 60 mg) for 2 weeks, then drop to 20 mg for 2 weeks.    Patient states she knows these dosages are high dosage and understands Dr. Durán has been helping her to try to come off steroids and help her with her adrenal issues.    Patient states she is going to get a second opinion before she decides if she is going to take the steroids or not.      Dr. Buzz Javed informed patient, that if patient does decide not to take any steriods, they would do another biopsy.    Patient will come in to see Dr. Durán on 03/24/20 and will discuss this plan more with Dr. Durán.      Ok with Dr. Durán for patient to come in on the 24th of March.

## 2020-03-20 NOTE — TELEPHONE ENCOUNTER
Duplicate message:       Note from previous message:    Patient states she spoke with Dr. Buzz Javed at the [facility].  The plan is to get the patient on either or the following steroids, in order to see how her body reacts to the inflammation, swelling and pain patient is currently experiencing.     Medrol:  48 mg        Or      Prednisone:  60 mg        Plan would be to take (either of the two mentioned med's) and take them as follows:     Start taking whatever the dose might be (48 mg or 60 mg) for 2 weeks, then drop to 20 mg for 2 weeks.     Patient states she knows these dosages are high dosage and understands Dr. Durán has been helping her to try to come off steroids and help her with her adrenal issues.     Patient states she is going to get a second opinion before she decides if she is going to take the steroids or not.       Dr. Buzz Javed informed patient, that if patient does decide not to take any steriods, they would do another biopsy.     Patient will come in to see Dr. Durán on 03/24/20 and will discuss this plan more with Dr. Durán.       Ok with Dr. Durán for patient to come in on the 24th of March.

## 2020-03-24 ENCOUNTER — OFFICE VISIT (OUTPATIENT)
Dept: ENDOCRINOLOGY | Facility: CLINIC | Age: 45
End: 2020-03-24

## 2020-03-24 VITALS
SYSTOLIC BLOOD PRESSURE: 118 MMHG | WEIGHT: 149.2 LBS | BODY MASS INDEX: 28.17 KG/M2 | OXYGEN SATURATION: 98 % | DIASTOLIC BLOOD PRESSURE: 60 MMHG | HEIGHT: 61 IN | HEART RATE: 92 BPM | TEMPERATURE: 98 F

## 2020-03-24 DIAGNOSIS — E27.40 ADRENAL INSUFFICIENCY (HCC): Primary | ICD-10-CM

## 2020-03-24 DIAGNOSIS — R79.89 LOW SERUM ADRENOCORTICOTROPHIC HORMONE (ACTH): ICD-10-CM

## 2020-03-24 PROCEDURE — 99212 OFFICE O/P EST SF 10 MIN: CPT | Performed by: INTERNAL MEDICINE

## 2020-03-24 RX ORDER — HYDROCORTISONE 10 MG/1
10 TABLET ORAL DAILY
Qty: 30 TABLET | Refills: 5 | Status: SHIPPED | OUTPATIENT
Start: 2020-03-24 | End: 2020-07-27

## 2020-03-24 NOTE — PROGRESS NOTES
"Chief Complaint   Patient presents with   • Follow-up   • Adrenal Insufficiency        HPI   Umberto Oliva is a 45 y.o. female had concerns including Follow-up and Adrenal Insufficiency.     Patient failed ACTH stimulation in the interim since her last visit.  Patient reports that she has been taking hydrocortisone 10 mg daily.  She denies any weight changes, dizziness, nausea, diarrhea.  She denies any missed doses of this medication reports that she takes it first thing in the morning.  She did utilize stress dosing in early January after having lower extremity biopsies performed.    Patient reports that in the interim since her last appointment she was seen at the [facility] regarding possible autoimmune disease as a cause for her lower extremity pain/weakness.. Provider was concerned for possible PAN but biopsies were not conclusive for this. She continues to have lower extremity swelling and stiffness. She does report that this was improved when she took stress dosing of hydrocortisone.  Per patient, provider at the [facility] could either like to repeat Pap smears or do a trial of high-dose steroids to see how patient responds.      The following portions of the patient's history were reviewed and updated as appropriate: allergies, current medications and past social history.    Review of Systems   Cardiac denies chest pain or palpitations  Gastrointestinal denies nausea or abdominal pain  Musculoskeletal: Reports lower extremity edema and pain    /60   Pulse 92   Temp 98 °F (36.7 °C) (Oral)   Ht 154.9 cm (60.98\")   Wt 67.7 kg (149 lb 3.2 oz)   SpO2 98%   BMI 28.21 kg/m²      Physical Exam      Constitutional:  well developed; well nourished  no acute distress   ENT/Thyroid: not examined   Eyes: Conjunctiva: clear   Respiratory:  breathing is unlabored  clear to auscultation bilaterally   Cardiovascular:  regular rate and rhythm   Chest:  Not performed.   Abdomen: soft, non-tender; no " masses   : Not performed.   Musculoskeletal: Not performed   Skin: dry and warm   Neuro: normal without focal findings and mental status, speech normal   Psych: mood and affect are within normal limits       Labs/Imaging  Results for UMBERTO SWARTZ (MRN [id number]) as of 3/24/2020 14:48   Ref. Range 12/23/2019 12:03 1/31/2020 10:13 1/31/2020 10:45 1/31/2020 11:23   Cortisol Latest Ref Range:   mcg/dL  8.21 14.71 15.72   Insulin-Like Growth Factor-1 Latest Ref Range: 62 - 204 ng/mL 197      LH Latest Units: mIU/mL 48.00      FSH Latest Units: mIU/mL 82.00      Prolactin Latest Ref Range: 4.79 - 23.30 ng/mL 11.80      Estradiol Latest Units: pg/mL <5.0      TSH Baseline Latest Ref Range: 0.270 - 4.200 uIU/mL 1.980      Free T4 Latest Ref Range: 0.93 - 1.70 ng/dL 1.06            Umberto was seen today for follow-up and adrenal insufficiency.    Diagnoses and all orders for this visit:    Adrenal insufficiency (CMS/HCC)   -Likely secondary to exogenous steroid use  -Patient failed ACTH stimulation 1/31/2020 with peak cortisol 15.72  -Patient currently taking hydrocortisone 10 mg daily  -Discussed with patient that if she elects not to move forward with trial of high-dose steroids per recommendation of the provider clinic, I would recommend she reduce hydrocortisone to 5 mg daily.  If patient is on this, could consider repeat ACTH stimulation at next visit.  -Discussed with patient that if she does decide to do a trial of high-dose steroids that following with high-dose regimen she should return to hydrocortisone 10 mg daily and we will taper from there as tolerated over the course of several months.  -Sick day rules were reviewed with the patient  -Patient advised on signs and symptoms of adrenal insufficiency/adrenal crisis, patient advised that in the event she is not able to tolerate oral steroid dosing she would need to present urgently for intravenous steroids.    -     hydrocortisone (CORTEF) 10 MG tablet; Take 1  tablet by mouth Daily.    Low serum adrenocorticotrophic hormone (ACTH)  -Likely due to exogenous steroids  -Growth hormone, thyroid hormone previously normal.  FSH and LH first visit appropriately elevated given postmenopausal state  -No clinical signs or symptoms of pituitary dysfunction otherwise  -Consider repeat ACTH in future    Return in about 4 months (around 7/24/2020) for Next scheduled follow up. The patient was instructed to contact the clinic with any interval questions or concerns.    Venita Durán MD   Endocrinologist    Please note that portions of this document were completed with a voice recognition program. Efforts were made to edit the dictations, but occasionally words are mis-transcribed.

## 2020-03-27 ENCOUNTER — LAB (OUTPATIENT)
Dept: LAB | Facility: HOSPITAL | Age: 45
End: 2020-03-27

## 2020-03-27 ENCOUNTER — TRANSCRIBE ORDERS (OUTPATIENT)
Dept: LAB | Facility: HOSPITAL | Age: 45
End: 2020-03-27

## 2020-03-27 DIAGNOSIS — Z13.9 SPECIAL SCREENING: ICD-10-CM

## 2020-03-27 DIAGNOSIS — L98.9 SKIN LESION: ICD-10-CM

## 2020-03-27 DIAGNOSIS — L98.9 SKIN LESION: Primary | ICD-10-CM

## 2020-03-27 PROCEDURE — 82542 COL CHROMOTOGRAPHY QUAL/QUAN: CPT

## 2020-03-27 PROCEDURE — 36415 COLL VENOUS BLD VENIPUNCTURE: CPT

## 2020-03-27 PROCEDURE — 86147 CARDIOLIPIN ANTIBODY EA IG: CPT

## 2020-03-28 LAB
CARDIOLIPIN IGG SER IA-ACNC: <9 GPL U/ML (ref 0–14)
CARDIOLIPIN IGM SER IA-ACNC: <9 MPL U/ML (ref 0–12)

## 2020-04-01 LAB
INTERPRETATION: NORMAL
REF LAB TEST METHOD: NORMAL
TPMT ACTIVITY: 21.6 UNITS/ML RBC

## 2020-05-22 DIAGNOSIS — M06.4 INFLAMMATORY POLYARTHROPATHY (HCC): ICD-10-CM

## 2020-05-22 DIAGNOSIS — G89.29 CHRONIC PAIN OF RIGHT ANKLE: Primary | ICD-10-CM

## 2020-05-22 DIAGNOSIS — M65.9 SYNOVITIS OF RIGHT ANKLE: ICD-10-CM

## 2020-05-22 DIAGNOSIS — M25.571 CHRONIC PAIN OF RIGHT ANKLE: Primary | ICD-10-CM

## 2020-06-03 ENCOUNTER — TRANSCRIBE ORDERS (OUTPATIENT)
Dept: LAB | Facility: HOSPITAL | Age: 45
End: 2020-06-03

## 2020-06-03 ENCOUNTER — LAB (OUTPATIENT)
Dept: LAB | Facility: HOSPITAL | Age: 45
End: 2020-06-03

## 2020-06-03 DIAGNOSIS — I77.6 VASCULITIS (HCC): ICD-10-CM

## 2020-06-03 DIAGNOSIS — I77.6 VASCULITIS (HCC): Primary | ICD-10-CM

## 2020-06-03 LAB
ALBUMIN SERPL-MCNC: 4.3 G/DL (ref 3.5–5.2)
ALBUMIN/GLOB SERPL: 2 G/DL
ALP SERPL-CCNC: 63 U/L (ref 39–117)
ALT SERPL W P-5'-P-CCNC: 16 U/L (ref 1–33)
ANION GAP SERPL CALCULATED.3IONS-SCNC: 9.7 MMOL/L (ref 5–15)
AST SERPL-CCNC: 17 U/L (ref 1–32)
BILIRUB SERPL-MCNC: 0.4 MG/DL (ref 0.2–1.2)
BUN BLD-MCNC: 15 MG/DL (ref 6–20)
BUN/CREAT SERPL: 19.7 (ref 7–25)
CALCIUM SPEC-SCNC: 9.1 MG/DL (ref 8.6–10.5)
CHLORIDE SERPL-SCNC: 101 MMOL/L (ref 98–107)
CO2 SERPL-SCNC: 27.3 MMOL/L (ref 22–29)
CREAT BLD-MCNC: 0.76 MG/DL (ref 0.57–1)
DEPRECATED RDW RBC AUTO: 53.4 FL (ref 37–54)
ERYTHROCYTE [DISTWIDTH] IN BLOOD BY AUTOMATED COUNT: 17.6 % (ref 12.3–15.4)
ERYTHROCYTE [SEDIMENTATION RATE] IN BLOOD: 5 MM/HR (ref 0–20)
GFR SERPL CREATININE-BSD FRML MDRD: 83 ML/MIN/1.73
GLOBULIN UR ELPH-MCNC: 2.2 GM/DL
GLUCOSE BLD-MCNC: 83 MG/DL (ref 65–99)
HCT VFR BLD AUTO: 36.8 % (ref 34–46.6)
HGB BLD-MCNC: 11.9 G/DL (ref 12–15.9)
LYMPHOCYTES # BLD MANUAL: 6.38 10*3/MM3 (ref 0.7–3.1)
LYMPHOCYTES NFR BLD MANUAL: 51.5 % (ref 19.6–45.3)
LYMPHOCYTES NFR BLD MANUAL: 8.9 % (ref 5–12)
MCH RBC QN AUTO: 27.3 PG (ref 26.6–33)
MCHC RBC AUTO-ENTMCNC: 32.3 G/DL (ref 31.5–35.7)
MCV RBC AUTO: 84.4 FL (ref 79–97)
MONOCYTES # BLD AUTO: 1.1 10*3/MM3 (ref 0.1–0.9)
NEUTROPHILS # BLD AUTO: 4.91 10*3/MM3 (ref 1.7–7)
NEUTROPHILS NFR BLD MANUAL: 39.6 % (ref 42.7–76)
PLAT MORPH BLD: NORMAL
PLATELET # BLD AUTO: 298 10*3/MM3 (ref 140–450)
PMV BLD AUTO: 8.7 FL (ref 6–12)
POTASSIUM BLD-SCNC: 3.9 MMOL/L (ref 3.5–5.2)
PROT SERPL-MCNC: 6.5 G/DL (ref 6–8.5)
RBC # BLD AUTO: 4.36 10*6/MM3 (ref 3.77–5.28)
RBC MORPH BLD: NORMAL
SODIUM BLD-SCNC: 138 MMOL/L (ref 136–145)
WBC MORPH BLD: NORMAL
WBC NRBC COR # BLD: 12.39 10*3/MM3 (ref 3.4–10.8)

## 2020-06-03 PROCEDURE — 80053 COMPREHEN METABOLIC PANEL: CPT

## 2020-06-03 PROCEDURE — 36415 COLL VENOUS BLD VENIPUNCTURE: CPT

## 2020-06-03 PROCEDURE — 85007 BL SMEAR W/DIFF WBC COUNT: CPT

## 2020-06-03 PROCEDURE — 85025 COMPLETE CBC W/AUTO DIFF WBC: CPT

## 2020-06-03 PROCEDURE — 85652 RBC SED RATE AUTOMATED: CPT

## 2020-06-12 ENCOUNTER — TELEMEDICINE (OUTPATIENT)
Dept: INTERNAL MEDICINE | Facility: CLINIC | Age: 45
End: 2020-06-12

## 2020-06-12 ENCOUNTER — TELEPHONE (OUTPATIENT)
Dept: INTERNAL MEDICINE | Facility: CLINIC | Age: 45
End: 2020-06-12

## 2020-06-12 DIAGNOSIS — Z20.822 SUSPECTED COVID-19 VIRUS INFECTION: Primary | ICD-10-CM

## 2020-06-12 DIAGNOSIS — Z29.8 IMMUNOTHERAPY: ICD-10-CM

## 2020-06-12 DIAGNOSIS — R50.9 FEVER OF UNKNOWN ORIGIN: ICD-10-CM

## 2020-06-12 PROCEDURE — 99213 OFFICE O/P EST LOW 20 MIN: CPT | Performed by: NURSE PRACTITIONER

## 2020-06-12 RX ORDER — AZATHIOPRINE 50 MG/1
150 TABLET ORAL DAILY
COMMUNITY
Start: 2020-05-15 | End: 2021-01-29

## 2020-06-12 RX ORDER — METHYLPREDNISOLONE 16 MG/1
TABLET ORAL
COMMUNITY
Start: 2020-05-20 | End: 2020-07-27

## 2020-06-12 RX ORDER — METHYLPREDNISOLONE 4 MG/1
8 TABLET ORAL DAILY
COMMUNITY
Start: 2020-05-15 | End: 2020-07-27

## 2020-06-12 NOTE — PROGRESS NOTES
You have chosen to receive care through a telehealth visit.  Do you consent to use a video/audio connection for your medical care today? Yes    Active parties: Goyo Orr, Umberto Oliva and Kym BOWER  Patient presents for a virtual visit. This visit was scheduled as a video visit to comply with patient safety concerns in accordance with CDC recommendations.    Chief Complaint / Reason:      Chief Complaint   Patient presents with   • Fever     started this morning @ 10am 100.7, body aches       Subjective     HPI  Presents today with complaints of facial swelling body ache fatigue and fever.  Patient states that the fever started this morning and it was 100.7 axillary.  She has not been feeling well since Monday and she spoke with [facility] regarding the facial edema and DrAmanda that she has been seen started decreasing her steroids and increased her Imuran.  Patient has been on Imuran for 3 weeks now and has been on high-dose steroids.  She denies any difficulty breathing but does have occasional chest tightness and headache.  She has not been exposed to anyone with COVID as far as she knows and did have some GI upset this morning along with the chills but denied any nausea vomiting or diarrhea.  She denies any urinary symptoms.  She states she has felt very thirsty but denies urine appearing dark.  He states that she did take her child to the dentist but that out in the vehicle.  Patient has tried taking Tylenol arthritis to help with the fever and body aches but it has provided minimal relief.  Patient is unsure if blood pressure is elevated as she does not have a way to check it and has not been anywhere to have it checked recently.  She states her daughter just graduated nursing school and does have test to take it with and she will have her checked it.    History taken from: patient    PMH/FH/Social History were reviewed and updated appropriately in the electronic medical record.   Past Medical  History:   Diagnosis Date   • Arthritis    • Endometriosis    • Headache    • Kidney stone    • Kidney stones    • Migraines    • Ovarian cyst    • Rheumatoid arthritis (CMS/HCC)      Past Surgical History:   Procedure Laterality Date   • COLON SURGERY      BOWEL BLOCKAGE  AGE 15   • DIAGNOSTIC LAPAROSCOPY      TIMES 4   • EXPLORATORY LAPAROTOMY  2006   • HYSTERECTOMY  07/24/2012    [facility] BS&O     Social History     Socioeconomic History   • Marital status:      Spouse name: Not on file   • Number of children: Not on file   • Years of education: Not on file   • Highest education level: Not on file   Tobacco Use   • Smoking status: Never Smoker   • Smokeless tobacco: Never Used   Substance and Sexual Activity   • Alcohol use: No   • Drug use: No   • Sexual activity: Yes     Partners: Male     Birth control/protection: Surgical     Family History   Problem Relation Age of Onset   • Osteoarthritis Mother    • Kidney disease Mother    • Migraines Mother    • Thyroid disease Mother    • Hypertension Father    • Diabetes Paternal Grandmother    • Diabetes Maternal Grandfather    • Kidney disease Maternal Grandfather    • Stroke Maternal Grandfather    • Stroke Maternal Grandmother        Review of Systems:   Review of Systems   Constitutional: Positive for fatigue and fever.   HENT: Positive for facial swelling.    Respiratory: Negative.    Cardiovascular: Negative.    Gastrointestinal: Negative.    Musculoskeletal: Positive for arthralgias and myalgias.   Neurological: Positive for weakness.   Psychiatric/Behavioral: Negative.  Positive for stress.         All other systems were reviewed and are negative.  Exceptions are noted in the subjective or above.      Objective     Vital Signs  There were no vitals filed for this visit.    There is no height or weight on file to calculate BMI.    Physical Exam   Constitutional: She is oriented to person, place, and time. She appears well-developed and well-nourished. No  "distress.   HENT:   Right Ear: External ear normal.   Left Ear: External ear normal.   Nose: Nose normal.   Mouth/Throat: Oropharynx is clear and moist.   Facial swelling noted in the cheeks and around eyes  Appears to have a \"moon face\" is on high-dose steroids.  Physical exam directed per patient.   Pulmonary/Chest: Effort normal. No respiratory distress. She exhibits no tenderness.   Musculoskeletal: Normal range of motion.   Neurological: She is alert and oriented to person, place, and time. Coordination normal.   Skin: Skin is warm and dry. Capillary refill takes less than 2 seconds.   Psychiatric: She has a normal mood and affect. Her behavior is normal. Judgment and thought content normal.   Nursing note reviewed.           Results Review:    I reviewed the patient's previous clinical results.       Medication Review:     Current Outpatient Medications:   •  Cyanocobalamin (B-12) 1000 MCG tablet, Take 1,000 mcg by mouth., Disp: , Rfl:   •  methylPREDNISolone (MEDROL) 16 MG tablet, TAKE 3 TABLETS BY MOUTH EVERY DAY, Disp: , Rfl:   •  methylPREDNISolone (MEDROL) 4 MG tablet, Take 8 mg by mouth Daily., Disp: , Rfl:   •  azaTHIOprine (IMURAN) 50 MG tablet, , Disp: , Rfl:   •  hydrocortisone (CORTEF) 10 MG tablet, Take 1 tablet by mouth Daily., Disp: 30 tablet, Rfl: 5  •  ibuprofen (ADVIL,MOTRIN) 800 MG tablet, Take 800 mg by mouth As Needed., Disp: , Rfl: 5  •  SUMAtriptan (IMITREX) 100 MG tablet, Take 1 tablet by mouth 1 (One) Time As Needed for Migraine (may repeat once in 2 hours if needed) for up to 1 dose., Disp: 9 tablet, Rfl: 5    Assessment/Plan   Umberto was seen today for fever.    Diagnoses and all orders for this visit:    Suspected COVID-19 virus infection  -     COVID-19,[facility], NP SWAB IN TRANSPORT MEDIA 48-72 HR TAT - Swab, Nasopharynx; Future    Fever of unknown origin  -     COVID-19,[facility], NP SWAB IN TRANSPORT MEDIA 48-72 HR TAT - Swab, Nasopharynx; Future  Differential " diagnosis with patient and recommend taking Tylenol to decrease fever along with maintaining hydration and keeping mouth moistened.  Discussed worsening signs and symptoms.  Reviewed and discussed previous lab results with patient.  Immunotherapy  -     COVID-19,[facility], NP SWAB IN TRANSPORT MEDIA 48-72 HR TAT - Swab, Nasopharynx; Future  Advised patient to self quarantine until results are available for COVID-19 testing she stated understanding.      Return if symptoms worsen or fail to improve.    Kym Charles, APRN  06/12/2020

## 2020-06-12 NOTE — TELEPHONE ENCOUNTER
WAS UNABLE TO WARM TRANSFER AND MAKE VIDEO VISIT APPT.  PT CALLED STATING THAT SHE IS HAVING A LOW GRADE FEVER, BODY ACHES AND HEADACHES.     PT CONTACT [phone]

## 2020-06-15 ENCOUNTER — TELEPHONE (OUTPATIENT)
Dept: INTERNAL MEDICINE | Facility: CLINIC | Age: 45
End: 2020-06-15

## 2020-06-15 NOTE — TELEPHONE ENCOUNTER
SHE HAD A VIDEO VISIT ON Friday, SHE WAS SENT TO GET A COVID 19 TEST ([facility]) WHICH CAME BACK NEGATIVE. SHE NEEDS A NOTE FOR HER SON SAYING SHE WAS NEGATIVE AND IT WILL BE OK FOR HIM TO RETURN TO WORK, HE WENT IN TODAY AND WAS SENT HOME UNTIL AFTER THE 20 TH. CAN THIS BE DONE FOR HER. HIS NAME MICHAEL SWARTZ. PLEASE LET HER KNOW. [phone]

## 2020-07-09 ENCOUNTER — LAB (OUTPATIENT)
Dept: LAB | Facility: HOSPITAL | Age: 45
End: 2020-07-09

## 2020-07-09 DIAGNOSIS — I77.6 VASCULITIS (HCC): ICD-10-CM

## 2020-07-09 LAB
BASOPHILS # BLD AUTO: 0.06 10*3/MM3 (ref 0–0.2)
BASOPHILS NFR BLD AUTO: 0.6 % (ref 0–1.5)
DEPRECATED RDW RBC AUTO: 52.1 FL (ref 37–54)
EOSINOPHIL # BLD AUTO: 0.06 10*3/MM3 (ref 0–0.4)
EOSINOPHIL NFR BLD AUTO: 0.6 % (ref 0.3–6.2)
ERYTHROCYTE [DISTWIDTH] IN BLOOD BY AUTOMATED COUNT: 17.1 % (ref 12.3–15.4)
HCT VFR BLD AUTO: 36.6 % (ref 34–46.6)
HGB BLD-MCNC: 12.3 G/DL (ref 12–15.9)
IMM GRANULOCYTES # BLD AUTO: 0.14 10*3/MM3 (ref 0–0.05)
IMM GRANULOCYTES NFR BLD AUTO: 1.3 % (ref 0–0.5)
LYMPHOCYTES # BLD AUTO: 2.46 10*3/MM3 (ref 0.7–3.1)
LYMPHOCYTES NFR BLD AUTO: 23.2 % (ref 19.6–45.3)
MCH RBC QN AUTO: 28.1 PG (ref 26.6–33)
MCHC RBC AUTO-ENTMCNC: 33.6 G/DL (ref 31.5–35.7)
MCV RBC AUTO: 83.6 FL (ref 79–97)
MONOCYTES # BLD AUTO: 0.88 10*3/MM3 (ref 0.1–0.9)
MONOCYTES NFR BLD AUTO: 8.3 % (ref 5–12)
NEUTROPHILS NFR BLD AUTO: 6.99 10*3/MM3 (ref 1.7–7)
NEUTROPHILS NFR BLD AUTO: 66 % (ref 42.7–76)
NRBC BLD AUTO-RTO: 0 /100 WBC (ref 0–0.2)
PLATELET # BLD AUTO: 354 10*3/MM3 (ref 140–450)
PMV BLD AUTO: 9.2 FL (ref 6–12)
RBC # BLD AUTO: 4.38 10*6/MM3 (ref 3.77–5.28)
WBC # BLD AUTO: 10.59 10*3/MM3 (ref 3.4–10.8)

## 2020-07-09 PROCEDURE — 85025 COMPLETE CBC W/AUTO DIFF WBC: CPT

## 2020-07-09 PROCEDURE — 85652 RBC SED RATE AUTOMATED: CPT

## 2020-07-09 PROCEDURE — 80053 COMPREHEN METABOLIC PANEL: CPT

## 2020-07-09 PROCEDURE — 36415 COLL VENOUS BLD VENIPUNCTURE: CPT

## 2020-07-10 LAB
ALBUMIN SERPL-MCNC: 4.3 G/DL (ref 3.5–5.2)
ALBUMIN/GLOB SERPL: 1.6 G/DL
ALP SERPL-CCNC: 96 U/L (ref 39–117)
ALT SERPL W P-5'-P-CCNC: 60 U/L (ref 1–33)
ANION GAP SERPL CALCULATED.3IONS-SCNC: 12.6 MMOL/L (ref 5–15)
AST SERPL-CCNC: 35 U/L (ref 1–32)
BILIRUB SERPL-MCNC: 0.3 MG/DL (ref 0–1.2)
BUN SERPL-MCNC: 11 MG/DL (ref 6–20)
BUN/CREAT SERPL: 14.3 (ref 7–25)
CALCIUM SPEC-SCNC: 9.8 MG/DL (ref 8.6–10.5)
CHLORIDE SERPL-SCNC: 103 MMOL/L (ref 98–107)
CO2 SERPL-SCNC: 26.4 MMOL/L (ref 22–29)
CREAT SERPL-MCNC: 0.77 MG/DL (ref 0.57–1)
ERYTHROCYTE [SEDIMENTATION RATE] IN BLOOD: 12 MM/HR (ref 0–20)
GFR SERPL CREATININE-BSD FRML MDRD: 82 ML/MIN/1.73
GLOBULIN UR ELPH-MCNC: 2.7 GM/DL
GLUCOSE SERPL-MCNC: 102 MG/DL (ref 65–99)
POTASSIUM SERPL-SCNC: 4 MMOL/L (ref 3.5–5.2)
PROT SERPL-MCNC: 7 G/DL (ref 6–8.5)
SODIUM SERPL-SCNC: 142 MMOL/L (ref 136–145)

## 2020-07-27 ENCOUNTER — OFFICE VISIT (OUTPATIENT)
Dept: INTERNAL MEDICINE | Facility: CLINIC | Age: 45
End: 2020-07-27

## 2020-07-27 VITALS
BODY MASS INDEX: 29.07 KG/M2 | WEIGHT: 154 LBS | HEART RATE: 123 BPM | SYSTOLIC BLOOD PRESSURE: 134 MMHG | OXYGEN SATURATION: 98 % | HEIGHT: 61 IN | DIASTOLIC BLOOD PRESSURE: 93 MMHG | TEMPERATURE: 96.8 F

## 2020-07-27 DIAGNOSIS — B37.0 ORAL THRUSH: Primary | ICD-10-CM

## 2020-07-27 DIAGNOSIS — R22.1 THROAT SWELLING: ICD-10-CM

## 2020-07-27 PROCEDURE — 99213 OFFICE O/P EST LOW 20 MIN: CPT | Performed by: PHYSICIAN ASSISTANT

## 2020-07-27 RX ORDER — FLUCONAZOLE 150 MG/1
150 TABLET ORAL ONCE
Qty: 1 TABLET | Refills: 0 | Status: SHIPPED | OUTPATIENT
Start: 2020-07-27 | End: 2020-07-27

## 2020-07-27 RX ORDER — METHYLPREDNISOLONE 8 MG/1
TABLET ORAL DAILY
COMMUNITY
End: 2020-09-17

## 2020-07-27 RX ORDER — ERGOCALCIFEROL (VITAMIN D2) 10 MCG
400 TABLET ORAL DAILY
COMMUNITY
End: 2022-02-18

## 2020-07-27 NOTE — PROGRESS NOTES
Umberto Oliva is a 45 y.o. female.     Subjective   History of Present Illness   Patient presents today with concern of swelling of the face and throat.  She is currently under the care of a rheumatologist at [facility] where she is being treated with Medrol which started at 48 mg daily in April and is now down to 10 mg daily.  Imuran was initiated in early May. Working diagnosis for her condition is vasculitis.  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  Swelling in the face has been present for a couple of months and swollen feeling in the throat has been bothersome for around 6 weeks.  She has not seen any visible swelling in her throat and also denies any erythema.  She notices the symptoms more when laying down as she has some difficulty breathing. Has caused some snoring and trouble swallowing at times due to soreness which she feels is due to feeling dry.  She reports that her mouth constantly feels dry so she is drinking more water.  No trouble with seasonal allergies.  In June she had a few days of fever and body aches without any sore throat.  She was tested for COVID-19 at that time which was negative.  Since then she has had no fever, body aches, headache, GI symptoms or  symptoms.  She has also had some soreness and occasional sharp pains in the left axilla on and off for 3-4 weeks with no appreciable adenopathy.  She also noticed her toenails have been changing color with white patches in the nails.  Since beginning high-dose Medrol taper she has noticed much improved swelling and pain in the ankles.        The following portions of the patient's history were reviewed and updated as appropriate: allergies, current medications, past family history, past medical history, past social history, past surgical history and problem list.    Review of Systems    Constitutional: Positive for fatigue (improved) and unexpected weight gain (5 pounds). Negative for activity change, chills and fever.   HENT: Positive for facial swelling, sore throat, swollen glands and trouble swallowing. Negative for congestion, drooling, ear pain, hearing loss, mouth sores, nosebleeds, postnasal drip, rhinorrhea, sinus pressure, sneezing and voice change.         Dry mouth.    Eyes: Negative.  Negative for visual disturbance.   Respiratory: Negative for cough, chest tightness, shortness of breath and wheezing.         Snoring     Cardiovascular: Negative for chest pain, palpitations and leg swelling.   Gastrointestinal: Negative for abdominal pain, constipation, diarrhea, nausea and vomiting.   Endocrine: Negative for cold intolerance, heat intolerance, polydipsia, polyphagia and polyuria.   Genitourinary: Negative.  Positive for breast pain. Negative for breast discharge, breast lump, decreased urine volume, difficulty urinating, dysuria, flank pain, frequency, genital sores, hematuria, pelvic pain, pelvic pressure, urgency and vaginal bleeding.   Musculoskeletal: Positive for arthralgias. Negative for gait problem, joint swelling and neck pain.   Skin: Positive for rash (ankles). Negative for dry skin and bruise.   Allergic/Immunologic: Negative for environmental allergies and immunocompromised state.   Neurological: Negative for dizziness, speech difficulty, weakness, numbness, headache, memory problem and confusion.   Hematological: Negative for adenopathy. Does not bruise/bleed easily.   Psychiatric/Behavioral: Negative for agitation, decreased concentration, hallucinations, sleep disturbance, suicidal ideas and depressed mood. The patient is not nervous/anxious.          Objective    Physical Exam   Constitutional: She is oriented to person, place, and time. She appears well-developed and well-nourished. No distress.   HENT:   Head: Normocephalic and atraumatic.   Right Ear: External  "ear normal.   Left Ear: External ear normal.   Trace oral leukoplakia. No oropharyngeal erythema or edema.  Arauz facies.    Eyes: Pupils are equal, round, and reactive to light. Conjunctivae and EOM are normal.   Neck: Normal range of motion. Neck supple.   Cardiovascular: Normal rate, regular rhythm and normal heart sounds. Exam reveals no gallop and no friction rub.   No murmur heard.  Pulmonary/Chest: Effort normal and breath sounds normal. No stridor. No respiratory distress. She has no wheezes. She has no rales. Right breast exhibits no inverted nipple, no mass, no skin change and no tenderness. Left breast exhibits tenderness (axilla). Left breast exhibits no inverted nipple, no mass and no skin change.       Abdominal: Soft. Bowel sounds are normal. She exhibits no mass. There is no tenderness.   Musculoskeletal: Normal range of motion. She exhibits no edema, tenderness or deformity.   Lymphadenopathy:     She has no cervical adenopathy (mildly tender anterior cervical nodes bilaterally without enlargement).   Neurological: She is alert and oriented to person, place, and time. She displays normal reflexes. No cranial nerve deficit or sensory deficit. She exhibits normal muscle tone. Coordination normal.   Skin: Skin is warm and dry. Capillary refill takes less than 2 seconds. Purpura (medial and lateral ankle, bilaterally) and rash noted. She is not diaphoretic. No pallor.   Psychiatric: She has a normal mood and affect. Her behavior is normal. Judgment and thought content normal.   Nursing note and vitals reviewed.        /93   Pulse (!) 123   Temp 96.8 °F (36 °C)   Ht 154.7 cm (60.9\")   Wt 69.9 kg (154 lb)   SpO2 98%   BMI 29.19 kg/m²     Nursing note and vitals reviewed.          Assessment/Plan   Umberto was seen today for edema.    Diagnoses and all orders for this visit:    Oral thrush  -     fluconazole (Diflucan) 150 MG tablet; Take 1 tablet by mouth 1 (One) Time for 1 dose.  -     nystatin " (MYCOSTATIN) 655083 UNIT/ML suspension; Swish and swallow 5 mL 4 (Four) Times a Day for 7 days.    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  She is scheduled to further decrease Medrol to 8 mg daily in a few days which may help symptoms.  Oral thrush likely the cause of dry mouth and soreness when swallowing which will be treated with Diflucan x1 and nystatin oral suspension 4 times daily for 1 week or until resolved.  Throat culture pending.    If tenderness in the left axilla fails to resolve within the next 7 to 10 days ultrasound and diagnostic mammogram will be considered.  She will notify me if symptoms fail to improve.         ARIELLE Hobbs  07/27/2020  11:05 AM

## 2020-07-30 LAB
Lab: NORMAL
S PYO THROAT QL CULT: NEGATIVE

## 2020-08-10 ENCOUNTER — TRANSCRIBE ORDERS (OUTPATIENT)
Dept: ADMINISTRATIVE | Facility: HOSPITAL | Age: 45
End: 2020-08-10

## 2020-08-10 ENCOUNTER — LAB (OUTPATIENT)
Dept: LAB | Facility: HOSPITAL | Age: 45
End: 2020-08-10

## 2020-08-10 DIAGNOSIS — I77.6 ARTERITIS, UNSPECIFIED (HCC): Primary | ICD-10-CM

## 2020-08-10 DIAGNOSIS — I77.6 ARTERITIS, UNSPECIFIED (HCC): ICD-10-CM

## 2020-08-10 LAB
ALBUMIN SERPL-MCNC: 4.2 G/DL (ref 3.5–5.2)
ALBUMIN/GLOB SERPL: 1.8 G/DL
ALP SERPL-CCNC: 49 U/L (ref 39–117)
ALT SERPL W P-5'-P-CCNC: 15 U/L (ref 1–33)
ANION GAP SERPL CALCULATED.3IONS-SCNC: 9.7 MMOL/L (ref 5–15)
AST SERPL-CCNC: 19 U/L (ref 1–32)
BASOPHILS # BLD AUTO: 0.08 10*3/MM3 (ref 0–0.2)
BASOPHILS NFR BLD AUTO: 1.1 % (ref 0–1.5)
BILIRUB SERPL-MCNC: 0.4 MG/DL (ref 0–1.2)
BUN SERPL-MCNC: 10 MG/DL (ref 6–20)
BUN/CREAT SERPL: 13.5 (ref 7–25)
CALCIUM SPEC-SCNC: 9.4 MG/DL (ref 8.6–10.5)
CHLORIDE SERPL-SCNC: 97 MMOL/L (ref 98–107)
CO2 SERPL-SCNC: 25.3 MMOL/L (ref 22–29)
CREAT SERPL-MCNC: 0.74 MG/DL (ref 0.57–1)
DEPRECATED RDW RBC AUTO: 51.8 FL (ref 37–54)
EOSINOPHIL # BLD AUTO: 0.03 10*3/MM3 (ref 0–0.4)
EOSINOPHIL NFR BLD AUTO: 0.4 % (ref 0.3–6.2)
ERYTHROCYTE [DISTWIDTH] IN BLOOD BY AUTOMATED COUNT: 15.9 % (ref 12.3–15.4)
ERYTHROCYTE [SEDIMENTATION RATE] IN BLOOD: 8 MM/HR (ref 0–20)
GFR SERPL CREATININE-BSD FRML MDRD: 86 ML/MIN/1.73
GLOBULIN UR ELPH-MCNC: 2.3 GM/DL
GLUCOSE SERPL-MCNC: 79 MG/DL (ref 65–99)
HCT VFR BLD AUTO: 35.6 % (ref 34–46.6)
HGB BLD-MCNC: 11.9 G/DL (ref 12–15.9)
IMM GRANULOCYTES # BLD AUTO: 0.03 10*3/MM3 (ref 0–0.05)
IMM GRANULOCYTES NFR BLD AUTO: 0.4 % (ref 0–0.5)
LYMPHOCYTES # BLD AUTO: 3.22 10*3/MM3 (ref 0.7–3.1)
LYMPHOCYTES NFR BLD AUTO: 42.9 % (ref 19.6–45.3)
MCH RBC QN AUTO: 29.4 PG (ref 26.6–33)
MCHC RBC AUTO-ENTMCNC: 33.4 G/DL (ref 31.5–35.7)
MCV RBC AUTO: 87.9 FL (ref 79–97)
MONOCYTES # BLD AUTO: 0.61 10*3/MM3 (ref 0.1–0.9)
MONOCYTES NFR BLD AUTO: 8.1 % (ref 5–12)
NEUTROPHILS NFR BLD AUTO: 3.53 10*3/MM3 (ref 1.7–7)
NEUTROPHILS NFR BLD AUTO: 47.1 % (ref 42.7–76)
NRBC BLD AUTO-RTO: 0 /100 WBC (ref 0–0.2)
PLATELET # BLD AUTO: 329 10*3/MM3 (ref 140–450)
PMV BLD AUTO: 9.3 FL (ref 6–12)
POTASSIUM SERPL-SCNC: 3.3 MMOL/L (ref 3.5–5.2)
PROT SERPL-MCNC: 6.5 G/DL (ref 6–8.5)
RBC # BLD AUTO: 4.05 10*6/MM3 (ref 3.77–5.28)
SODIUM SERPL-SCNC: 132 MMOL/L (ref 136–145)
WBC # BLD AUTO: 7.5 10*3/MM3 (ref 3.4–10.8)

## 2020-08-10 PROCEDURE — 80053 COMPREHEN METABOLIC PANEL: CPT

## 2020-08-10 PROCEDURE — 85025 COMPLETE CBC W/AUTO DIFF WBC: CPT

## 2020-08-10 PROCEDURE — 82607 VITAMIN B-12: CPT | Performed by: PHYSICIAN ASSISTANT

## 2020-08-10 PROCEDURE — 82306 VITAMIN D 25 HYDROXY: CPT | Performed by: PHYSICIAN ASSISTANT

## 2020-08-10 PROCEDURE — 36415 COLL VENOUS BLD VENIPUNCTURE: CPT | Performed by: PHYSICIAN ASSISTANT

## 2020-08-10 PROCEDURE — 85652 RBC SED RATE AUTOMATED: CPT

## 2020-08-11 DIAGNOSIS — E53.8 B12 DEFICIENCY: Primary | ICD-10-CM

## 2020-08-11 LAB
25(OH)D3 SERPL-MCNC: 26.9 NG/ML (ref 30–100)
VIT B12 SERPL-MCNC: 213 PG/ML (ref 232–1245)

## 2020-08-11 RX ORDER — CYANOCOBALAMIN 1000 UG/ML
INJECTION, SOLUTION INTRAMUSCULAR; SUBCUTANEOUS
Qty: 10 ML | Refills: 1 | Status: SHIPPED | OUTPATIENT
Start: 2020-08-11 | End: 2021-08-11

## 2020-08-14 DIAGNOSIS — K21.9 GASTROESOPHAGEAL REFLUX DISEASE, ESOPHAGITIS PRESENCE NOT SPECIFIED: Primary | ICD-10-CM

## 2020-08-14 RX ORDER — OMEPRAZOLE 40 MG/1
40 CAPSULE, DELAYED RELEASE ORAL DAILY
Qty: 14 CAPSULE | Refills: 0 | Status: SHIPPED | OUTPATIENT
Start: 2020-08-14 | End: 2020-08-28

## 2020-09-23 ENCOUNTER — TELEMEDICINE (OUTPATIENT)
Dept: INTERNAL MEDICINE | Facility: CLINIC | Age: 45
End: 2020-09-23

## 2020-09-23 DIAGNOSIS — R05.8 POST-VIRAL COUGH SYNDROME: Primary | ICD-10-CM

## 2020-09-23 DIAGNOSIS — J30.9 ALLERGIC RHINITIS, UNSPECIFIED SEASONALITY, UNSPECIFIED TRIGGER: ICD-10-CM

## 2020-09-23 DIAGNOSIS — Z86.16 HISTORY OF 2019 NOVEL CORONAVIRUS DISEASE (COVID-19): ICD-10-CM

## 2020-09-23 PROCEDURE — 99213 OFFICE O/P EST LOW 20 MIN: CPT | Performed by: NURSE PRACTITIONER

## 2020-09-23 RX ORDER — FLUTICASONE PROPIONATE 50 MCG
2 SPRAY, SUSPENSION (ML) NASAL DAILY
Qty: 15.8 ML | Refills: 1 | Status: SHIPPED | OUTPATIENT
Start: 2020-09-23 | End: 2021-03-25

## 2020-09-23 NOTE — PROGRESS NOTES
You have chosen to receive care through a telehealth visit.  Do you consent to use a video/audio connection for your medical care today? Yes  Involved parties: Sinai Napier CMA, CHELI Sutherland and patient.  Patient presents for a virtual visit. This visit was scheduled as a video visit to comply with patient safety concerns in accordance with CDC recommendations.    Chief Complaint / Reason:      Chief Complaint   Patient presents with   • Cough     getting over covid       Subjective     HPI  Patient presents today with complaints of cough and states that she has been getting over the COVID as she was diagnosed with it September 2 and she was positive at urgent care [facility].  She continued to have some issues along with cough and congestion and went to [facility] urgent care on 9/17/2020 and was prescribed Z-Bimal steroids and inhaler along with some Phenergan as she had been having some nausea vomiting and diarrhea.  She states overall she has done well and continues to improve but she still has a cough.  She was taken off the Imuran by the [facility] when she was diagnosed with COVID and has not restarted back but will follow-up with them regarding plan of care.  She currently denies fever shortness of breath chest pain or heart palpitations.  She states that she still feels a little weak but is improving daily.  She does not appear to be in any acute distress at this time.   History taken from: patient    PMH/FH/Social History were reviewed and updated appropriately in the electronic medical record.   Past Medical History:   Diagnosis Date   • Arthritis    • Endometriosis    • Headache    • Kidney stone    • Kidney stones    • Migraines    • Ovarian cyst    • Rheumatoid arthritis (CMS/HCC)      Past Surgical History:   Procedure Laterality Date   • COLON SURGERY      BOWEL BLOCKAGE  AGE 15   • DIAGNOSTIC LAPAROSCOPY      TIMES 4   • EXPLORATORY LAPAROTOMY  2006   • HYSTERECTOMY  07/24/2012    [facility]  BS&O     Social History     Socioeconomic History   • Marital status:      Spouse name: Not on file   • Number of children: Not on file   • Years of education: Not on file   • Highest education level: Not on file   Tobacco Use   • Smoking status: Never Smoker   • Smokeless tobacco: Never Used   Substance and Sexual Activity   • Alcohol use: No   • Drug use: No   • Sexual activity: Yes     Partners: Male     Birth control/protection: Surgical     Family History   Problem Relation Age of Onset   • Osteoarthritis Mother    • Kidney disease Mother    • Migraines Mother    • Thyroid disease Mother    • Hypertension Father    • Diabetes Paternal Grandmother    • Diabetes Maternal Grandfather    • Kidney disease Maternal Grandfather    • Stroke Maternal Grandfather    • Stroke Maternal Grandmother        Review of Systems:   Review of Systems   Constitutional: Positive for fatigue.   Respiratory: Positive for cough.    Cardiovascular: Negative.    Allergic/Immunologic: Positive for environmental allergies.   Neurological: Negative.    Psychiatric/Behavioral: Negative.  Positive for stress.         All other systems were reviewed and are negative.  Exceptions are noted in the subjective or above.      Objective     Vital Signs  There were no vitals filed for this visit.    There is no height or weight on file to calculate BMI.    Physical Exam  Nursing note (Physical examination directed per patient) reviewed.   Constitutional:       General: She is not in acute distress.     Appearance: She is well-developed.   HENT:      Right Ear: External ear normal.      Left Ear: External ear normal.      Nose: Nose normal.      Mouth/Throat:      Mouth: Mucous membranes are dry.      Comments: PND  Eyes:      Pupils: Pupils are equal, round, and reactive to light.   Pulmonary:      Effort: Pulmonary effort is normal. No respiratory distress.   Chest:      Chest wall: No tenderness.   Musculoskeletal: Normal range of motion.  "  Skin:     General: Skin is warm and dry.      Capillary Refill: Capillary refill takes less than 2 seconds.   Neurological:      Mental Status: She is alert and oriented to person, place, and time.      Coordination: Coordination normal.   Psychiatric:         Behavior: Behavior normal.         Thought Content: Thought content normal.         Judgment: Judgment normal.              Results Review:    I reviewed the patient's previous clinical results.       Medication Review:     Current Outpatient Medications:   •  albuterol sulfate  (90 Base) MCG/ACT inhaler, Inhale 2 puffs Every 4 (Four) Hours As Needed for Wheezing., Disp: 8 g, Rfl: 0  •  azaTHIOprine (IMURAN) 50 MG tablet, 50 mg 3 (Three) Times a Day., Disp: , Rfl:   •  cyanocobalamin 1000 MCG/ML injection, Inject 1 mL into the appropriate muscle as directed by prescriber 1 (One) Time Per Week for 30 days, THEN 1 mL Every 28 (Twenty-Eight) Days for 335 days., Disp: 10 mL, Rfl: 1  •  promethazine (PHENERGAN) 12.5 MG tablet, Take 1 tablet by mouth Every 6 (Six) Hours As Needed for Nausea or Vomiting. May take 2 pills per dose if needed., Disp: 30 tablet, Rfl: 0  •  SUMAtriptan (IMITREX) 100 MG tablet, Take 1 tablet by mouth 1 (One) Time As Needed for Migraine (may repeat once in 2 hours if needed) for up to 1 dose., Disp: 9 tablet, Rfl: 5  •  Syringe/Needle, Disp, 25G X 5/8\" 1 ML misc, 1 each As Needed (use with B12 injections)., Disp: 50 each, Rfl: 1  •  Vitamin D, Cholecalciferol, (CHOLECALCIFEROL) 10 MCG (400 UNIT) tablet, Take 400 Units by mouth Daily., Disp: , Rfl:   •  fluticasone (Flonase) 50 MCG/ACT nasal spray, 2 sprays into the nostril(s) as directed by provider Daily., Disp: 15.8 mL, Rfl: 1    Assessment/Plan   Umberto was seen today for cough.    Diagnoses and all orders for this visit:    Post-viral cough syndrome  Recommend treating symptomatically keep mouth moistened and discussed worsening signs and symptoms  History of 2019 novel " coronavirus disease (COVID-19)    Allergic rhinitis, unspecified seasonality, unspecified trigger  -     fluticasone (Flonase) 50 MCG/ACT nasal spray; 2 sprays into the nostril(s) as directed by provider Daily.        Return if symptoms worsen or fail to improve.    Kym Charles, APRN  09/23/2020

## 2020-11-05 ENCOUNTER — LAB (OUTPATIENT)
Dept: LAB | Facility: HOSPITAL | Age: 45
End: 2020-11-05

## 2020-11-05 ENCOUNTER — TRANSCRIBE ORDERS (OUTPATIENT)
Dept: LAB | Facility: HOSPITAL | Age: 45
End: 2020-11-05

## 2020-11-05 DIAGNOSIS — I77.6 VASCULITIS (HCC): ICD-10-CM

## 2020-11-05 DIAGNOSIS — I77.6 VASCULITIS (HCC): Primary | ICD-10-CM

## 2020-11-05 LAB
BASOPHILS # BLD AUTO: 0.05 10*3/MM3 (ref 0–0.2)
BASOPHILS NFR BLD AUTO: 0.7 % (ref 0–1.5)
DEPRECATED RDW RBC AUTO: 44.9 FL (ref 37–54)
EOSINOPHIL # BLD AUTO: 0.02 10*3/MM3 (ref 0–0.4)
EOSINOPHIL NFR BLD AUTO: 0.3 % (ref 0.3–6.2)
ERYTHROCYTE [DISTWIDTH] IN BLOOD BY AUTOMATED COUNT: 13.2 % (ref 12.3–15.4)
HCT VFR BLD AUTO: 36.4 % (ref 34–46.6)
HGB BLD-MCNC: 12.5 G/DL (ref 12–15.9)
IMM GRANULOCYTES # BLD AUTO: 0.03 10*3/MM3 (ref 0–0.05)
IMM GRANULOCYTES NFR BLD AUTO: 0.4 % (ref 0–0.5)
LYMPHOCYTES # BLD AUTO: 2.77 10*3/MM3 (ref 0.7–3.1)
LYMPHOCYTES NFR BLD AUTO: 41.5 % (ref 19.6–45.3)
MCH RBC QN AUTO: 32.1 PG (ref 26.6–33)
MCHC RBC AUTO-ENTMCNC: 34.3 G/DL (ref 31.5–35.7)
MCV RBC AUTO: 93.6 FL (ref 79–97)
MONOCYTES # BLD AUTO: 0.49 10*3/MM3 (ref 0.1–0.9)
MONOCYTES NFR BLD AUTO: 7.3 % (ref 5–12)
NEUTROPHILS NFR BLD AUTO: 3.32 10*3/MM3 (ref 1.7–7)
NEUTROPHILS NFR BLD AUTO: 49.8 % (ref 42.7–76)
NRBC BLD AUTO-RTO: 0 /100 WBC (ref 0–0.2)
PLATELET # BLD AUTO: 357 10*3/MM3 (ref 140–450)
PMV BLD AUTO: 8.9 FL (ref 6–12)
RBC # BLD AUTO: 3.89 10*6/MM3 (ref 3.77–5.28)
WBC # BLD AUTO: 6.68 10*3/MM3 (ref 3.4–10.8)

## 2020-11-05 PROCEDURE — 85025 COMPLETE CBC W/AUTO DIFF WBC: CPT | Performed by: INTERNAL MEDICINE

## 2020-11-05 PROCEDURE — 36415 COLL VENOUS BLD VENIPUNCTURE: CPT

## 2020-11-05 PROCEDURE — 80053 COMPREHEN METABOLIC PANEL: CPT | Performed by: INTERNAL MEDICINE

## 2020-11-05 PROCEDURE — 85652 RBC SED RATE AUTOMATED: CPT | Performed by: INTERNAL MEDICINE

## 2020-11-06 ENCOUNTER — OFFICE VISIT (OUTPATIENT)
Dept: INTERNAL MEDICINE | Facility: CLINIC | Age: 45
End: 2020-11-06

## 2020-11-06 VITALS
BODY MASS INDEX: 28.34 KG/M2 | HEART RATE: 106 BPM | SYSTOLIC BLOOD PRESSURE: 122 MMHG | OXYGEN SATURATION: 98 % | DIASTOLIC BLOOD PRESSURE: 78 MMHG | HEIGHT: 62 IN | WEIGHT: 154 LBS | TEMPERATURE: 98 F

## 2020-11-06 DIAGNOSIS — G93.31 POST VIRAL SYNDROME: ICD-10-CM

## 2020-11-06 DIAGNOSIS — M06.4 INFLAMMATORY POLYARTHROPATHY (HCC): ICD-10-CM

## 2020-11-06 DIAGNOSIS — R42 DIZZINESS: Primary | ICD-10-CM

## 2020-11-06 DIAGNOSIS — G43.C0 PERIODIC HEADACHE SYNDROME, NOT INTRACTABLE: ICD-10-CM

## 2020-11-06 LAB
ALBUMIN SERPL-MCNC: 4.5 G/DL (ref 3.5–5.2)
ALBUMIN/GLOB SERPL: 1.8 G/DL
ALP SERPL-CCNC: 66 U/L (ref 39–117)
ALT SERPL W P-5'-P-CCNC: 73 U/L (ref 1–33)
ANION GAP SERPL CALCULATED.3IONS-SCNC: 7.7 MMOL/L (ref 5–15)
AST SERPL-CCNC: 54 U/L (ref 1–32)
BILIRUB SERPL-MCNC: 0.5 MG/DL (ref 0–1.2)
BUN SERPL-MCNC: 10 MG/DL (ref 6–20)
BUN/CREAT SERPL: 16.1 (ref 7–25)
CALCIUM SPEC-SCNC: 9.4 MG/DL (ref 8.6–10.5)
CHLORIDE SERPL-SCNC: 104 MMOL/L (ref 98–107)
CO2 SERPL-SCNC: 30.3 MMOL/L (ref 22–29)
CREAT SERPL-MCNC: 0.62 MG/DL (ref 0.57–1)
ERYTHROCYTE [SEDIMENTATION RATE] IN BLOOD: 9 MM/HR (ref 0–20)
GFR SERPL CREATININE-BSD FRML MDRD: 105 ML/MIN/1.73
GLOBULIN UR ELPH-MCNC: 2.5 GM/DL
GLUCOSE SERPL-MCNC: 108 MG/DL (ref 65–99)
POTASSIUM SERPL-SCNC: 3.9 MMOL/L (ref 3.5–5.2)
PROT SERPL-MCNC: 7 G/DL (ref 6–8.5)
SODIUM SERPL-SCNC: 142 MMOL/L (ref 136–145)

## 2020-11-06 PROCEDURE — 99214 OFFICE O/P EST MOD 30 MIN: CPT | Performed by: NURSE PRACTITIONER

## 2020-11-06 RX ORDER — SUMATRIPTAN 100 MG/1
100 TABLET, FILM COATED ORAL ONCE AS NEEDED
Qty: 9 TABLET | Refills: 5 | Status: SHIPPED | OUTPATIENT
Start: 2020-11-06 | End: 2021-06-18 | Stop reason: SDUPTHER

## 2020-11-06 RX ORDER — MECLIZINE HCL 12.5 MG/1
12.5 TABLET ORAL 3 TIMES DAILY PRN
Qty: 18 TABLET | Refills: 0 | Status: SHIPPED | OUTPATIENT
Start: 2020-11-06 | End: 2021-03-25

## 2020-11-06 RX ORDER — AMOXICILLIN AND CLAVULANATE POTASSIUM 875; 125 MG/1; MG/1
1 TABLET, FILM COATED ORAL 2 TIMES DAILY
Qty: 14 TABLET | Refills: 0 | Status: SHIPPED | OUTPATIENT
Start: 2020-11-06 | End: 2020-11-13

## 2020-11-06 RX ORDER — METHYLPREDNISOLONE 4 MG/1
4 TABLET ORAL DAILY
COMMUNITY
End: 2021-10-29

## 2020-11-06 NOTE — PROGRESS NOTES
"Date: 2020    Name: Umberto Oliva  : 1975    Chief Complaint:   Chief Complaint   Patient presents with   • Dizziness     pt states this has been going on since she was diagnosed with covid in  sept. pt states that it comes and goes    • Hip Pain     rt hip       HPI:  Umberto Oliva is a 45 y.o. female presents with persistent dizziness, present since she was diagnosed with COVID in September.  Not associated with movement.  Denies fever, chills, myalgia, palpitations, shortness of breath, orthopnea, diaphoresis, nausea, vomiting, diarrhea, decreased appetite, weakness, fatigue, nasal congestion, earache, vision changes. Headaches/migraines have increased since onset of COVID.  She normally takes sumatriptan, and it is effective.  Needs refill.    She is also having increased right hip pain.  She has inflammatory polyarthritis, has been seen by a provider at [facility].  She continues to take Imuran 50 mg 3 times daily.  She is also taking a methylprednisolone Dosepak.    History:  The following portions of the patient's history were reviewed and updated as appropriate: allergies, current medications, past medical history, family history, surgical history, social history and problem list.     ROS:  Review of Systems   Constitutional: Negative.    HENT: Negative.    Respiratory: Negative.    Cardiovascular: Negative.    Skin: Negative for pallor and rash.   Neurological: Negative for facial asymmetry, speech difficulty, light-headedness and memory problem.       VS:  Vitals:    20 1605   BP: 122/78   Pulse: 106   Temp: 98 °F (36.7 °C)   TempSrc: Infrared   SpO2: 98%   Weight: 69.9 kg (154 lb)   Height: 157.5 cm (62\")     Body mass index is 28.17 kg/m².    PE:  Physical Exam  Constitutional:       Appearance: She is not ill-appearing.   HENT:      Head: Normocephalic.      Right Ear: Tympanic membrane, ear canal and external ear normal.      Left Ear: Tympanic membrane, ear canal and external " ear normal.      Ears:      Comments: Negative Gabriele-Hallpike maneuver     Nose: Congestion present.      Right Sinus: Maxillary sinus tenderness and frontal sinus tenderness present.      Left Sinus: Maxillary sinus tenderness and frontal sinus tenderness present.      Mouth/Throat:      Mouth: Mucous membranes are moist.      Pharynx: Oropharynx is clear.   Eyes:      Extraocular Movements: Extraocular movements intact.      Conjunctiva/sclera: Conjunctivae normal.      Pupils: Pupils are equal, round, and reactive to light.   Neck:      Musculoskeletal: Normal range of motion and neck supple.   Cardiovascular:      Rate and Rhythm: Normal rate and regular rhythm.      Pulses: Normal pulses.      Heart sounds: Normal heart sounds.   Pulmonary:      Effort: Pulmonary effort is normal.      Breath sounds: Normal breath sounds.   Musculoskeletal:      Comments: Right hip slightly tender to touch, pain elicited with extension and flexion of hip.     Lymphadenopathy:      Cervical: No cervical adenopathy.   Skin:     General: Skin is warm.      Capillary Refill: Capillary refill takes less than 2 seconds.   Neurological:      Mental Status: She is alert and oriented to person, place, and time.      Coordination: Coordination normal.      Gait: Gait normal.   Psychiatric:         Mood and Affect: Mood normal.         Behavior: Behavior normal.         Thought Content: Thought content normal.         Assessment/Plan:  Diagnoses and all orders for this visit:    1. Dizziness (Primary)  -     amoxicillin-clavulanate (Augmentin) 875-125 MG per tablet; Take 1 tablet by mouth 2 (Two) Times a Day for 7 days.  Dispense: 14 tablet; Refill: 0  -     meclizine (ANTIVERT) 12.5 MG tablet; Take 1 tablet by mouth 3 (Three) Times a Day As Needed for Dizziness or Nausea.  Dispense: 18 tablet; Refill: 0    2. Post viral syndrome  -     amoxicillin-clavulanate (Augmentin) 875-125 MG per tablet; Take 1 tablet by mouth 2 (Two) Times a Day for  7 days.  Dispense: 14 tablet; Refill: 0    3. Inflammatory polyarthropathy (CMS/HCC)    4. Periodic headache syndrome, not intractable  -     SUMAtriptan (IMITREX) 100 MG tablet; Take 1 tablet by mouth 1 (One) Time As Needed for Migraine (may repeat once in 2 hours if needed).  Dispense: 9 tablet; Refill: 5    Stay well hydrated.  Rest.  Work excuse offered.    Discussed persistent hip pain with rheumatologist.        Return in about 3 months (around 2/15/2021) for Annual.

## 2020-11-09 ENCOUNTER — HOSPITAL ENCOUNTER (OUTPATIENT)
Dept: GENERAL RADIOLOGY | Facility: HOSPITAL | Age: 45
Discharge: HOME | End: 2020-11-09
Admitting: NURSE PRACTITIONER

## 2020-11-09 DIAGNOSIS — R05.9 COUGH: ICD-10-CM

## 2020-11-09 DIAGNOSIS — R06.02 SHORTNESS OF BREATH: Primary | ICD-10-CM

## 2020-11-09 PROCEDURE — 71046 X-RAY EXAM CHEST 2 VIEWS: CPT

## 2020-11-10 ENCOUNTER — TELEPHONE (OUTPATIENT)
Dept: PEDIATRICS | Facility: OTHER | Age: 45
End: 2020-11-10

## 2020-11-10 NOTE — TELEPHONE ENCOUNTER
Please advise Umberto CXR is normal.  My advice is to go to the ED or [facility], if she continues to have symptoms she described at last appointment.

## 2020-11-10 NOTE — TELEPHONE ENCOUNTER
PT STATES THAT SHE IS NOT GETTING ANY BETTER BUT WORSE.    PT IS AFRAID AND DOESN'T KNOW WHAT TO DO.    PT HAD A CHEST XRAY AND IS CURRENTLY WAITING ON RESULTS      PLEASE ADVISE  [phone]

## 2020-11-13 DIAGNOSIS — E53.8 B12 DEFICIENCY: Primary | ICD-10-CM

## 2020-11-13 DIAGNOSIS — E55.9 VITAMIN D DEFICIENCY: ICD-10-CM

## 2020-12-01 ENCOUNTER — HOSPITAL ENCOUNTER (OUTPATIENT)
Dept: CT IMAGING | Facility: HOSPITAL | Age: 45
Discharge: HOME | End: 2020-12-01
Admitting: PHYSICIAN ASSISTANT

## 2020-12-01 ENCOUNTER — OFFICE VISIT (OUTPATIENT)
Dept: INTERNAL MEDICINE | Facility: CLINIC | Age: 45
End: 2020-12-01

## 2020-12-01 VITALS
TEMPERATURE: 97.1 F | BODY MASS INDEX: 28.17 KG/M2 | DIASTOLIC BLOOD PRESSURE: 80 MMHG | OXYGEN SATURATION: 98 % | HEIGHT: 63 IN | SYSTOLIC BLOOD PRESSURE: 112 MMHG | WEIGHT: 159 LBS | HEART RATE: 91 BPM

## 2020-12-01 DIAGNOSIS — R35.0 URINARY FREQUENCY: ICD-10-CM

## 2020-12-01 DIAGNOSIS — R10.9 RIGHT FLANK PAIN: Primary | ICD-10-CM

## 2020-12-01 DIAGNOSIS — N20.0 LEFT NEPHROLITHIASIS: Primary | ICD-10-CM

## 2020-12-01 DIAGNOSIS — R31.29 MICROSCOPIC HEMATURIA: ICD-10-CM

## 2020-12-01 LAB
BILIRUB BLD-MCNC: NEGATIVE MG/DL
CLARITY, POC: CLEAR
COLOR UR: YELLOW
GLUCOSE UR STRIP-MCNC: NEGATIVE MG/DL
KETONES UR QL: NEGATIVE
LEUKOCYTE EST, POC: NEGATIVE
NITRITE UR-MCNC: NEGATIVE MG/ML
PH UR: 6 [PH] (ref 5–8)
PROT UR STRIP-MCNC: NEGATIVE MG/DL
RBC # UR STRIP: ABNORMAL /UL
SP GR UR: 1.02 (ref 1–1.03)
UROBILINOGEN UR QL: NORMAL

## 2020-12-01 PROCEDURE — 99213 OFFICE O/P EST LOW 20 MIN: CPT | Performed by: PHYSICIAN ASSISTANT

## 2020-12-01 PROCEDURE — 96372 THER/PROPH/DIAG INJ SC/IM: CPT | Performed by: PHYSICIAN ASSISTANT

## 2020-12-01 PROCEDURE — 81003 URINALYSIS AUTO W/O SCOPE: CPT | Performed by: PHYSICIAN ASSISTANT

## 2020-12-01 PROCEDURE — 74176 CT ABD & PELVIS W/O CONTRAST: CPT

## 2020-12-01 RX ORDER — TRAMADOL HYDROCHLORIDE 50 MG/1
50 TABLET ORAL EVERY 6 HOURS PRN
Qty: 12 TABLET | Refills: 0 | Status: SHIPPED | OUTPATIENT
Start: 2020-12-01 | End: 2021-01-04

## 2020-12-01 RX ORDER — KETOROLAC TROMETHAMINE 30 MG/ML
30 INJECTION, SOLUTION INTRAMUSCULAR; INTRAVENOUS ONCE
Status: COMPLETED | OUTPATIENT
Start: 2020-12-01 | End: 2020-12-01

## 2020-12-01 RX ORDER — TAMSULOSIN HYDROCHLORIDE 0.4 MG/1
1 CAPSULE ORAL NIGHTLY
Qty: 30 CAPSULE | Refills: 0 | Status: ON HOLD | OUTPATIENT
Start: 2020-12-01 | End: 2020-12-28

## 2020-12-01 RX ADMIN — KETOROLAC TROMETHAMINE 30 MG: 30 INJECTION, SOLUTION INTRAMUSCULAR; INTRAVENOUS at 12:10

## 2020-12-01 NOTE — PROGRESS NOTES
Umberto Oliva is a 45 y.o. female.     Subjective   History of Present Illness   She started having intermittent dull pain in the right anterolateral ribs last week and thought she may have slept wrong until yesterday when she developed acutely worsened flank pain which has fluctuated from very sharp to dull.  Yesterday she noticed some urinary urgency and frequency with decreased urine output. This morning she noticed a little burning with urination.  She denies fever, chills, vomiting, diarrhea, constipation, low back pain or hematuria.  She has had some increased pain with taking a deep breath but denies any associated SOA or chest pain. She has had some nausea when pain is more intense. No history of pyelonephritis.        The following portions of the patient's history were reviewed and updated as appropriate: allergies, current medications, past family history, past medical history, past social history, past surgical history and problem list.    Review of Systems   Constitutional: Negative for activity change, chills, fatigue and fever.   HENT: Negative.    Eyes: Negative.  Negative for visual disturbance.   Respiratory: Negative for cough, chest tightness, shortness of breath and wheezing.    Cardiovascular: Negative for chest pain, palpitations and leg swelling.   Gastrointestinal: Positive for abdominal pain and nausea. Negative for constipation, diarrhea and vomiting.   Endocrine: Negative for polydipsia, polyphagia and polyuria.   Genitourinary: Positive for decreased urine volume, difficulty urinating, dysuria, flank pain, frequency and urgency. Negative for hematuria, pelvic pain and pelvic pressure.   Musculoskeletal: Positive for arthralgias and back pain.   Skin: Negative.    Allergic/Immunologic: Negative for immunocompromised state.   Neurological: Negative for dizziness, speech difficulty, weakness, headache and confusion.   Hematological: Negative for adenopathy. Does not bruise/bleed easily.    Psychiatric/Behavioral: Positive for sleep disturbance (pain). Negative for agitation, suicidal ideas and depressed mood. The patient is not nervous/anxious.          Objective    Physical Exam  Vitals signs and nursing note reviewed.   Constitutional:       General: She is not in acute distress.     Appearance: She is well-developed and overweight. She is not diaphoretic.      Interventions: Face mask in place.   HENT:      Head: Normocephalic and atraumatic.   Eyes:      Conjunctiva/sclera: Conjunctivae normal.      Pupils: Pupils are equal, round, and reactive to light.   Neck:      Musculoskeletal: Normal range of motion and neck supple. No neck rigidity or muscular tenderness.   Cardiovascular:      Rate and Rhythm: Normal rate and regular rhythm.      Heart sounds: Normal heart sounds. No murmur. No friction rub. No gallop.    Pulmonary:      Effort: Pulmonary effort is normal. No respiratory distress.      Breath sounds: Normal breath sounds. No wheezing, rhonchi or rales.   Chest:      Chest wall: No tenderness.   Abdominal:      General: Bowel sounds are normal. There is no distension.      Palpations: Abdomen is soft. There is no mass.      Tenderness: There is abdominal tenderness in the right upper quadrant and suprapubic area. There is right CVA tenderness. There is no left CVA tenderness, guarding or rebound. Negative signs include Dean's sign, Rovsing's sign, McBurney's sign, psoas sign and obturator sign.      Hernia: No hernia is present.   Musculoskeletal: Normal range of motion.         General: No tenderness or deformity.      Right lower leg: No edema.      Left lower leg: No edema.   Lymphadenopathy:      Cervical: No cervical adenopathy.   Skin:     General: Skin is warm and dry.      Capillary Refill: Capillary refill takes less than 2 seconds.      Findings: No rash.   Neurological:      General: No focal deficit present.      Mental Status: She is alert and oriented to person, place,  "and time.      Cranial Nerves: No cranial nerve deficit.      Sensory: No sensory deficit.      Motor: No abnormal muscle tone.      Coordination: Coordination normal.      Gait: Gait normal.      Deep Tendon Reflexes: Reflexes normal.   Psychiatric:         Mood and Affect: Mood normal.         Behavior: Behavior normal. Behavior is cooperative.         Thought Content: Thought content normal.         Judgment: Judgment normal.           /80   Pulse 91   Temp 97.1 °F (36.2 °C)   Ht 160 cm (62.99\")   Wt 72.1 kg (159 lb)   SpO2 98%   BMI 28.17 kg/m²     Nursing note and vitals reviewed.          Assessment/Plan   Diagnoses and all orders for this visit:    1. Right flank pain (Primary)  -     POCT urinalysis dipstick, automated  -     Urine Culture - Urine, Urine, Clean Catch  -     CT Abdomen Pelvis Stone Protocol  -     ketorolac (TORADOL) injection 30 mg    2. Microscopic hematuria  -     Urine Culture - Urine, Urine, Clean Catch  -     CT Abdomen Pelvis Stone Protocol    3. Urinary frequency  -     Urine Culture - Urine, Urine, Clean Catch  -     CT Abdomen Pelvis Stone Protocol    Will obtain CT abdomen and pelvis stone protocol for further evaluation of suspected nephrolithiasis.    Brief Urine Lab Results  (Last result in the past 365 days)      Color   Clarity   Blood   Leuk Est   Nitrite   Protein   CREAT   Urine HCG        12/01/20 1136 Yellow Clear 3+ Negative Negative Negative             ER with any acutely worsened symptoms.          ARIELLE Hobbs  12/01/2020  11:35 EST  "

## 2020-12-03 DIAGNOSIS — N20.0 LEFT NEPHROLITHIASIS: Primary | ICD-10-CM

## 2020-12-03 RX ORDER — KETOROLAC TROMETHAMINE 10 MG/1
10 TABLET, FILM COATED ORAL EVERY 6 HOURS PRN
Qty: 20 TABLET | Refills: 0 | Status: ON HOLD | OUTPATIENT
Start: 2020-12-03 | End: 2020-12-28

## 2020-12-18 DIAGNOSIS — N20.0 NEPHROLITHIASIS: ICD-10-CM

## 2020-12-18 DIAGNOSIS — R10.9 RIGHT FLANK PAIN: ICD-10-CM

## 2020-12-18 DIAGNOSIS — N20.0 LEFT NEPHROLITHIASIS: Primary | ICD-10-CM

## 2020-12-27 ENCOUNTER — HOSPITAL ENCOUNTER (OUTPATIENT)
Facility: HOSPITAL | Age: 45
Setting detail: OBSERVATION
Discharge: HOME | End: 2020-12-29
Attending: EMERGENCY MEDICINE | Admitting: INTERNAL MEDICINE

## 2020-12-27 ENCOUNTER — APPOINTMENT (OUTPATIENT)
Dept: CT IMAGING | Facility: HOSPITAL | Age: 45
End: 2020-12-27

## 2020-12-27 DIAGNOSIS — N28.0 RENAL INFARCT (HCC): Primary | ICD-10-CM

## 2020-12-27 DIAGNOSIS — R10.84 GENERALIZED ABDOMINAL PAIN: ICD-10-CM

## 2020-12-27 DIAGNOSIS — R93.5 ABNORMAL CT OF THE ABDOMEN: ICD-10-CM

## 2020-12-27 DIAGNOSIS — R52 INTRACTABLE PAIN: ICD-10-CM

## 2020-12-27 DIAGNOSIS — R07.81 RIB PAIN ON RIGHT SIDE: ICD-10-CM

## 2020-12-27 LAB
ALBUMIN SERPL-MCNC: 4.1 G/DL (ref 3.5–5.2)
ALBUMIN/GLOB SERPL: 1.6 G/DL
ALP SERPL-CCNC: 75 U/L (ref 39–117)
ALT SERPL W P-5'-P-CCNC: 22 U/L (ref 1–33)
ANION GAP SERPL CALCULATED.3IONS-SCNC: 13 MMOL/L (ref 5–15)
ANION GAP SERPL CALCULATED.3IONS-SCNC: 14 MMOL/L (ref 5–15)
AST SERPL-CCNC: 24 U/L (ref 1–32)
BASOPHILS # BLD AUTO: 0.07 10*3/MM3 (ref 0–0.2)
BASOPHILS NFR BLD AUTO: 0.9 % (ref 0–1.5)
BILIRUB SERPL-MCNC: 0.4 MG/DL (ref 0–1.2)
BILIRUB UR QL STRIP: NEGATIVE
BUN SERPL-MCNC: 13 MG/DL (ref 6–20)
BUN SERPL-MCNC: 13 MG/DL (ref 6–20)
BUN/CREAT SERPL: 16.9 (ref 7–25)
BUN/CREAT SERPL: 17.3 (ref 7–25)
CALCIUM SPEC-SCNC: 9.1 MG/DL (ref 8.6–10.5)
CALCIUM SPEC-SCNC: 9.2 MG/DL (ref 8.6–10.5)
CHLORIDE SERPL-SCNC: 101 MMOL/L (ref 98–107)
CHLORIDE SERPL-SCNC: 104 MMOL/L (ref 98–107)
CLARITY UR: CLEAR
CO2 SERPL-SCNC: 23 MMOL/L (ref 22–29)
CO2 SERPL-SCNC: 25 MMOL/L (ref 22–29)
COLOR UR: YELLOW
CREAT SERPL-MCNC: 0.75 MG/DL (ref 0.57–1)
CREAT SERPL-MCNC: 0.77 MG/DL (ref 0.57–1)
DEPRECATED RDW RBC AUTO: 49.4 FL (ref 37–54)
EOSINOPHIL # BLD AUTO: 0.03 10*3/MM3 (ref 0–0.4)
EOSINOPHIL NFR BLD AUTO: 0.4 % (ref 0.3–6.2)
ERYTHROCYTE [DISTWIDTH] IN BLOOD BY AUTOMATED COUNT: 13.5 % (ref 12.3–15.4)
GFR SERPL CREATININE-BSD FRML MDRD: 82 ML/MIN/1.73
GFR SERPL CREATININE-BSD FRML MDRD: 84 ML/MIN/1.73
GLOBULIN UR ELPH-MCNC: 2.5 GM/DL
GLUCOSE SERPL-MCNC: 101 MG/DL (ref 65–99)
GLUCOSE SERPL-MCNC: 104 MG/DL (ref 65–99)
GLUCOSE UR STRIP-MCNC: NEGATIVE MG/DL
HCT VFR BLD AUTO: 38.7 % (ref 34–46.6)
HGB BLD-MCNC: 12.1 G/DL (ref 12–15.9)
HGB UR QL STRIP.AUTO: NEGATIVE
HOLD SPECIMEN: NORMAL
HOLD SPECIMEN: NORMAL
IMM GRANULOCYTES # BLD AUTO: 0.03 10*3/MM3 (ref 0–0.05)
IMM GRANULOCYTES NFR BLD AUTO: 0.4 % (ref 0–0.5)
KETONES UR QL STRIP: NEGATIVE
LEUKOCYTE ESTERASE UR QL STRIP.AUTO: NEGATIVE
LYMPHOCYTES # BLD AUTO: 3.31 10*3/MM3 (ref 0.7–3.1)
LYMPHOCYTES NFR BLD AUTO: 44.5 % (ref 19.6–45.3)
MCH RBC QN AUTO: 31.3 PG (ref 26.6–33)
MCHC RBC AUTO-ENTMCNC: 31.3 G/DL (ref 31.5–35.7)
MCV RBC AUTO: 100 FL (ref 79–97)
MONOCYTES # BLD AUTO: 0.59 10*3/MM3 (ref 0.1–0.9)
MONOCYTES NFR BLD AUTO: 7.9 % (ref 5–12)
NEUTROPHILS NFR BLD AUTO: 3.41 10*3/MM3 (ref 1.7–7)
NEUTROPHILS NFR BLD AUTO: 45.9 % (ref 42.7–76)
NITRITE UR QL STRIP: NEGATIVE
NRBC BLD AUTO-RTO: 0 /100 WBC (ref 0–0.2)
PH UR STRIP.AUTO: 6 [PH] (ref 5–8)
PLATELET # BLD AUTO: 320 10*3/MM3 (ref 140–450)
PMV BLD AUTO: 8.9 FL (ref 6–12)
POTASSIUM SERPL-SCNC: 3.6 MMOL/L (ref 3.5–5.2)
POTASSIUM SERPL-SCNC: 3.7 MMOL/L (ref 3.5–5.2)
PROT SERPL-MCNC: 6.6 G/DL (ref 6–8.5)
PROT UR QL STRIP: NEGATIVE
RBC # BLD AUTO: 3.87 10*6/MM3 (ref 3.77–5.28)
SODIUM SERPL-SCNC: 138 MMOL/L (ref 136–145)
SODIUM SERPL-SCNC: 142 MMOL/L (ref 136–145)
SP GR UR STRIP: <=1.005 (ref 1–1.03)
UROBILINOGEN UR QL STRIP: NORMAL
WBC # BLD AUTO: 7.44 10*3/MM3 (ref 3.4–10.8)
WHOLE BLOOD HOLD SPECIMEN: NORMAL
WHOLE BLOOD HOLD SPECIMEN: NORMAL

## 2020-12-27 PROCEDURE — 99284 EMERGENCY DEPT VISIT MOD MDM: CPT

## 2020-12-27 PROCEDURE — 74176 CT ABD & PELVIS W/O CONTRAST: CPT

## 2020-12-27 PROCEDURE — 83605 ASSAY OF LACTIC ACID: CPT | Performed by: PHYSICIAN ASSISTANT

## 2020-12-27 PROCEDURE — 85379 FIBRIN DEGRADATION QUANT: CPT | Performed by: EMERGENCY MEDICINE

## 2020-12-27 PROCEDURE — 80053 COMPREHEN METABOLIC PANEL: CPT | Performed by: PHYSICIAN ASSISTANT

## 2020-12-27 PROCEDURE — 85025 COMPLETE CBC W/AUTO DIFF WBC: CPT | Performed by: PHYSICIAN ASSISTANT

## 2020-12-27 PROCEDURE — 80048 BASIC METABOLIC PNL TOTAL CA: CPT | Performed by: PHYSICIAN ASSISTANT

## 2020-12-27 PROCEDURE — 81003 URINALYSIS AUTO W/O SCOPE: CPT

## 2020-12-27 RX ORDER — KETOROLAC TROMETHAMINE 15 MG/ML
15 INJECTION, SOLUTION INTRAMUSCULAR; INTRAVENOUS ONCE
Status: COMPLETED | OUTPATIENT
Start: 2020-12-27 | End: 2020-12-28

## 2020-12-27 RX ORDER — HYDROMORPHONE HYDROCHLORIDE 1 MG/ML
0.5 INJECTION, SOLUTION INTRAMUSCULAR; INTRAVENOUS; SUBCUTANEOUS
Status: DISCONTINUED | OUTPATIENT
Start: 2020-12-27 | End: 2020-12-28

## 2020-12-27 RX ORDER — ONDANSETRON 2 MG/ML
4 INJECTION INTRAMUSCULAR; INTRAVENOUS ONCE
Status: COMPLETED | OUTPATIENT
Start: 2020-12-27 | End: 2020-12-28

## 2020-12-28 ENCOUNTER — APPOINTMENT (OUTPATIENT)
Dept: CT IMAGING | Facility: HOSPITAL | Age: 45
End: 2020-12-28

## 2020-12-28 ENCOUNTER — APPOINTMENT (OUTPATIENT)
Dept: ULTRASOUND IMAGING | Facility: HOSPITAL | Age: 45
End: 2020-12-28

## 2020-12-28 PROBLEM — R10.9 FLANK PAIN: Status: ACTIVE | Noted: 2020-12-28

## 2020-12-28 PROBLEM — D84.9 IMMUNOCOMPROMISED (HCC): Status: ACTIVE | Noted: 2020-12-28

## 2020-12-28 LAB
ANION GAP SERPL CALCULATED.3IONS-SCNC: 8 MMOL/L (ref 5–15)
BUN SERPL-MCNC: 7 MG/DL (ref 6–20)
BUN/CREAT SERPL: 9.6 (ref 7–25)
CALCIUM SPEC-SCNC: 8.7 MG/DL (ref 8.6–10.5)
CHLORIDE SERPL-SCNC: 109 MMOL/L (ref 98–107)
CO2 SERPL-SCNC: 27 MMOL/L (ref 22–29)
CREAT SERPL-MCNC: 0.73 MG/DL (ref 0.57–1)
CRP SERPL-MCNC: 0.13 MG/DL (ref 0–0.5)
D DIMER PPP FEU-MCNC: <0.27 MCGFEU/ML (ref 0–0.56)
D-LACTATE SERPL-SCNC: 1.2 MMOL/L (ref 0.5–2)
DEPRECATED RDW RBC AUTO: 50.3 FL (ref 37–54)
ERYTHROCYTE [DISTWIDTH] IN BLOOD BY AUTOMATED COUNT: 13.7 % (ref 12.3–15.4)
ERYTHROCYTE [SEDIMENTATION RATE] IN BLOOD: 6 MM/HR (ref 0–20)
GFR SERPL CREATININE-BSD FRML MDRD: 87 ML/MIN/1.73
GLUCOSE SERPL-MCNC: 89 MG/DL (ref 65–99)
HCT VFR BLD AUTO: 37.8 % (ref 34–46.6)
HGB BLD-MCNC: 12.1 G/DL (ref 12–15.9)
MCH RBC QN AUTO: 32.3 PG (ref 26.6–33)
MCHC RBC AUTO-ENTMCNC: 32 G/DL (ref 31.5–35.7)
MCV RBC AUTO: 100.8 FL (ref 79–97)
PLATELET # BLD AUTO: 280 10*3/MM3 (ref 140–450)
PMV BLD AUTO: 8.4 FL (ref 6–12)
POTASSIUM SERPL-SCNC: 3.8 MMOL/L (ref 3.5–5.2)
RBC # BLD AUTO: 3.75 10*6/MM3 (ref 3.77–5.28)
SARS-COV-2 RDRP RESP QL NAA+PROBE: DETECTED
SODIUM SERPL-SCNC: 144 MMOL/L (ref 136–145)
WBC # BLD AUTO: 4.77 10*3/MM3 (ref 3.4–10.8)

## 2020-12-28 PROCEDURE — 85652 RBC SED RATE AUTOMATED: CPT | Performed by: INTERNAL MEDICINE

## 2020-12-28 PROCEDURE — 96376 TX/PRO/DX INJ SAME DRUG ADON: CPT

## 2020-12-28 PROCEDURE — 63710000001 METHYLPREDNISOLONE PER 4 MG: Performed by: PHYSICIAN ASSISTANT

## 2020-12-28 PROCEDURE — 96372 THER/PROPH/DIAG INJ SC/IM: CPT

## 2020-12-28 PROCEDURE — 25010000002 ONDANSETRON PER 1 MG: Performed by: PHYSICIAN ASSISTANT

## 2020-12-28 PROCEDURE — 96374 THER/PROPH/DIAG INJ IV PUSH: CPT

## 2020-12-28 PROCEDURE — 99225 PR SBSQ OBSERVATION CARE/DAY 25 MINUTES: CPT | Performed by: INTERNAL MEDICINE

## 2020-12-28 PROCEDURE — 0 IOPAMIDOL PER 1 ML: Performed by: EMERGENCY MEDICINE

## 2020-12-28 PROCEDURE — 25010000002 KETOROLAC TROMETHAMINE PER 15 MG: Performed by: PHYSICIAN ASSISTANT

## 2020-12-28 PROCEDURE — 80048 BASIC METABOLIC PNL TOTAL CA: CPT | Performed by: PHYSICIAN ASSISTANT

## 2020-12-28 PROCEDURE — G0378 HOSPITAL OBSERVATION PER HR: HCPCS

## 2020-12-28 PROCEDURE — 25010000002 ENOXAPARIN PER 10 MG: Performed by: INTERNAL MEDICINE

## 2020-12-28 PROCEDURE — 76700 US EXAM ABDOM COMPLETE: CPT

## 2020-12-28 PROCEDURE — 96361 HYDRATE IV INFUSION ADD-ON: CPT

## 2020-12-28 PROCEDURE — 87635 SARS-COV-2 COVID-19 AMP PRB: CPT | Performed by: PHYSICIAN ASSISTANT

## 2020-12-28 PROCEDURE — 25010000002 HEPARIN (PORCINE) PER 1000 UNITS: Performed by: PHYSICIAN ASSISTANT

## 2020-12-28 PROCEDURE — 85027 COMPLETE CBC AUTOMATED: CPT | Performed by: PHYSICIAN ASSISTANT

## 2020-12-28 PROCEDURE — 96375 TX/PRO/DX INJ NEW DRUG ADDON: CPT

## 2020-12-28 PROCEDURE — 86140 C-REACTIVE PROTEIN: CPT | Performed by: INTERNAL MEDICINE

## 2020-12-28 PROCEDURE — 74177 CT ABD & PELVIS W/CONTRAST: CPT

## 2020-12-28 RX ORDER — ACETAMINOPHEN 160 MG/5ML
650 SOLUTION ORAL EVERY 4 HOURS PRN
Status: DISCONTINUED | OUTPATIENT
Start: 2020-12-28 | End: 2020-12-29 | Stop reason: HOSPADM

## 2020-12-28 RX ORDER — METHYLPREDNISOLONE 4 MG/1
4 TABLET ORAL DAILY
Status: DISCONTINUED | OUTPATIENT
Start: 2020-12-28 | End: 2020-12-28

## 2020-12-28 RX ORDER — ACETAMINOPHEN 650 MG/1
650 SUPPOSITORY RECTAL EVERY 4 HOURS PRN
Status: DISCONTINUED | OUTPATIENT
Start: 2020-12-28 | End: 2020-12-29 | Stop reason: HOSPADM

## 2020-12-28 RX ORDER — ALBUTEROL SULFATE 2.5 MG/3ML
2.5 SOLUTION RESPIRATORY (INHALATION) EVERY 4 HOURS PRN
Status: DISCONTINUED | OUTPATIENT
Start: 2020-12-28 | End: 2020-12-29 | Stop reason: HOSPADM

## 2020-12-28 RX ORDER — ONDANSETRON 2 MG/ML
4 INJECTION INTRAMUSCULAR; INTRAVENOUS EVERY 6 HOURS PRN
Status: DISCONTINUED | OUTPATIENT
Start: 2020-12-28 | End: 2020-12-29 | Stop reason: HOSPADM

## 2020-12-28 RX ORDER — FLUTICASONE PROPIONATE 50 MCG
2 SPRAY, SUSPENSION (ML) NASAL DAILY
Status: DISCONTINUED | OUTPATIENT
Start: 2020-12-28 | End: 2020-12-29 | Stop reason: HOSPADM

## 2020-12-28 RX ORDER — AZATHIOPRINE 50 MG/1
50 TABLET ORAL
Status: DISCONTINUED | OUTPATIENT
Start: 2020-12-28 | End: 2020-12-28

## 2020-12-28 RX ORDER — METHYLPREDNISOLONE 4 MG/1
8 TABLET ORAL DAILY
Status: DISCONTINUED | OUTPATIENT
Start: 2020-12-29 | End: 2020-12-29 | Stop reason: HOSPADM

## 2020-12-28 RX ORDER — KETOROLAC TROMETHAMINE 30 MG/ML
30 INJECTION, SOLUTION INTRAMUSCULAR; INTRAVENOUS EVERY 6 HOURS PRN
Status: DISCONTINUED | OUTPATIENT
Start: 2020-12-28 | End: 2020-12-29 | Stop reason: HOSPADM

## 2020-12-28 RX ORDER — HEPARIN SODIUM 5000 [USP'U]/ML
5000 INJECTION, SOLUTION INTRAVENOUS; SUBCUTANEOUS EVERY 8 HOURS SCHEDULED
Status: DISCONTINUED | OUTPATIENT
Start: 2020-12-28 | End: 2020-12-28 | Stop reason: ALTCHOICE

## 2020-12-28 RX ORDER — AZATHIOPRINE 50 MG/1
150 TABLET ORAL DAILY
Status: DISCONTINUED | OUTPATIENT
Start: 2020-12-28 | End: 2020-12-29 | Stop reason: HOSPADM

## 2020-12-28 RX ORDER — METHYLPREDNISOLONE 4 MG/1
4 TABLET ORAL ONCE
Status: DISCONTINUED | OUTPATIENT
Start: 2020-12-28 | End: 2020-12-29 | Stop reason: HOSPADM

## 2020-12-28 RX ORDER — ONDANSETRON 4 MG/1
4 TABLET, FILM COATED ORAL EVERY 6 HOURS PRN
Status: DISCONTINUED | OUTPATIENT
Start: 2020-12-28 | End: 2020-12-29 | Stop reason: HOSPADM

## 2020-12-28 RX ORDER — SODIUM CHLORIDE 0.9 % (FLUSH) 0.9 %
10 SYRINGE (ML) INJECTION EVERY 12 HOURS SCHEDULED
Status: DISCONTINUED | OUTPATIENT
Start: 2020-12-28 | End: 2020-12-29 | Stop reason: HOSPADM

## 2020-12-28 RX ORDER — CHOLECALCIFEROL (VITAMIN D3) 125 MCG
5 CAPSULE ORAL NIGHTLY PRN
Status: DISCONTINUED | OUTPATIENT
Start: 2020-12-28 | End: 2020-12-29 | Stop reason: HOSPADM

## 2020-12-28 RX ORDER — SODIUM CHLORIDE 0.9 % (FLUSH) 0.9 %
10 SYRINGE (ML) INJECTION AS NEEDED
Status: DISCONTINUED | OUTPATIENT
Start: 2020-12-28 | End: 2020-12-29 | Stop reason: HOSPADM

## 2020-12-28 RX ORDER — SODIUM CHLORIDE, SODIUM LACTATE, POTASSIUM CHLORIDE, CALCIUM CHLORIDE 600; 310; 30; 20 MG/100ML; MG/100ML; MG/100ML; MG/100ML
100 INJECTION, SOLUTION INTRAVENOUS CONTINUOUS
Status: ACTIVE | OUTPATIENT
Start: 2020-12-28 | End: 2020-12-28

## 2020-12-28 RX ORDER — ACETAMINOPHEN 325 MG/1
650 TABLET ORAL EVERY 4 HOURS PRN
Status: DISCONTINUED | OUTPATIENT
Start: 2020-12-28 | End: 2020-12-29 | Stop reason: HOSPADM

## 2020-12-28 RX ADMIN — HEPARIN SODIUM 5000 UNITS: 5000 INJECTION INTRAVENOUS; SUBCUTANEOUS at 04:35

## 2020-12-28 RX ADMIN — SODIUM CHLORIDE, POTASSIUM CHLORIDE, SODIUM LACTATE AND CALCIUM CHLORIDE 100 ML/HR: 600; 310; 30; 20 INJECTION, SOLUTION INTRAVENOUS at 04:34

## 2020-12-28 RX ADMIN — ENOXAPARIN SODIUM 40 MG: 40 INJECTION SUBCUTANEOUS at 12:28

## 2020-12-28 RX ADMIN — IOPAMIDOL 100 ML: 755 INJECTION, SOLUTION INTRAVENOUS at 00:25

## 2020-12-28 RX ADMIN — KETOROLAC TROMETHAMINE 30 MG: 30 INJECTION, SOLUTION INTRAMUSCULAR; INTRAVENOUS at 20:46

## 2020-12-28 RX ADMIN — ACETAMINOPHEN 650 MG: 325 TABLET, FILM COATED ORAL at 04:34

## 2020-12-28 RX ADMIN — KETOROLAC TROMETHAMINE 15 MG: 15 INJECTION, SOLUTION INTRAMUSCULAR; INTRAVENOUS at 00:05

## 2020-12-28 RX ADMIN — ONDANSETRON 4 MG: 2 INJECTION INTRAMUSCULAR; INTRAVENOUS at 00:05

## 2020-12-28 RX ADMIN — SODIUM CHLORIDE 1000 ML: 9 INJECTION, SOLUTION INTRAVENOUS at 00:04

## 2020-12-28 RX ADMIN — METHYLPREDNISOLONE 4 MG: 4 TABLET ORAL at 15:17

## 2020-12-29 ENCOUNTER — READMISSION MANAGEMENT (OUTPATIENT)
Dept: CALL CENTER | Facility: HOSPITAL | Age: 45
End: 2020-12-29

## 2020-12-29 VITALS
DIASTOLIC BLOOD PRESSURE: 82 MMHG | HEIGHT: 63 IN | SYSTOLIC BLOOD PRESSURE: 122 MMHG | HEART RATE: 105 BPM | OXYGEN SATURATION: 94 % | WEIGHT: 160 LBS | RESPIRATION RATE: 16 BRPM | TEMPERATURE: 97.2 F | BODY MASS INDEX: 28.35 KG/M2

## 2020-12-29 PROCEDURE — 25010000002 KETOROLAC TROMETHAMINE PER 15 MG: Performed by: PHYSICIAN ASSISTANT

## 2020-12-29 PROCEDURE — 99217 PR OBSERVATION CARE DISCHARGE MANAGEMENT: CPT | Performed by: INTERNAL MEDICINE

## 2020-12-29 PROCEDURE — 63710000001 AZATHIOPRINE PER 50 MG: Performed by: INTERNAL MEDICINE

## 2020-12-29 PROCEDURE — 63710000001 METHYLPREDNISOLONE PER 4 MG: Performed by: INTERNAL MEDICINE

## 2020-12-29 PROCEDURE — G0378 HOSPITAL OBSERVATION PER HR: HCPCS

## 2020-12-29 PROCEDURE — 96372 THER/PROPH/DIAG INJ SC/IM: CPT

## 2020-12-29 RX ADMIN — METHYLPREDNISOLONE 8 MG: 4 TABLET ORAL at 08:53

## 2020-12-29 RX ADMIN — AZATHIOPRINE 150 MG: 50 TABLET ORAL at 08:37

## 2020-12-29 RX ADMIN — FLUTICASONE PROPIONATE 2 SPRAY: 50 SPRAY, METERED NASAL at 08:37

## 2020-12-29 RX ADMIN — KETOROLAC TROMETHAMINE 30 MG: 30 INJECTION, SOLUTION INTRAMUSCULAR; INTRAVENOUS at 08:40

## 2020-12-29 NOTE — OUTREACH NOTE
Prep Survey      Responses   [facility] patient discharged from?  [place]   Is LACE score < 7 ?  Yes   Emergency Room discharge w/ pulse ox?  No   Eligibility  [facility]   Date of Admission  12/27/20   Date of Discharge  12/29/20   Discharge Disposition  Home or Self Care   Discharge diagnosis  Right flank pain,    Vasculitis,    (Note that COVID-19 nasal swab was positive this admission and believed to be related to her recent infection, she is no new/different symptoms, and nothing to suggest recurrent infection).    Does the patient have one of the following disease processes/diagnoses(primary or secondary)?  Other   Does the patient have Home health ordered?  No   Is there a DME ordered?  No   Prep survey completed?  Yes          Ena Celis RN

## 2020-12-30 ENCOUNTER — TRANSITIONAL CARE MANAGEMENT TELEPHONE ENCOUNTER (OUTPATIENT)
Dept: CALL CENTER | Facility: HOSPITAL | Age: 45
End: 2020-12-30

## 2020-12-30 NOTE — OUTREACH NOTE
Call Center TCM Note      Responses   [facility] patient discharged from?  [place]   Does the patient have one of the following disease processes/diagnoses(primary or secondary)?  Other   TCM attempt successful?  No   Unsuccessful attempts  Attempt 1 [Attempted patient and  #. ]          Ting Guillen RN    12/30/2020, 13:49 EST

## 2020-12-30 NOTE — OUTREACH NOTE
Call Center TCM Note      Responses   [facility] patient discharged from?  [place]   Does the patient have one of the following disease processes/diagnoses(primary or secondary)?  Other   TCM attempt successful?  Yes   Call start time  1529   Call end time  1553   Discharge diagnosis  Right flank pain,    Vasculitis,    (Note that COVID-19 nasal swab was positive this admission and believed to be related to her recent infection, she is no new/different symptoms, and nothing to suggest recurrent infection).    Is patient permission given to speak with other caregiver?  No   Meds reviewed with patient/caregiver?  Yes   Is the patient having any side effects they believe may be caused by any medication additions or changes?  No   Does the patient have all medications ordered at discharge?  Yes   Is the patient taking all medications as directed (includes completed medication regime)?  Yes   Does the patient have a primary care provider?   Yes   Does the patient have an appointment with their PCP within 7 days of discharge?  Yes   Comments regarding PCP  Hospital Follow Up with ARIELLE Hobbs Monday Jan 4, 2021 11:00 AM   Has the patient kept scheduled appointments due by today?  N/A   Comments  patient has concerns over having another COVID positive test with this admission. She reports that hospital felt that she was still showing positive from COVID illness from September.    Has home health visited the patient within 72 hours of discharge?  N/A   Psychosocial issues?  No   Did the patient receive a copy of their discharge instructions?  Yes   Nursing interventions  Reviewed instructions with patient   What is the patient's perception of their health status since discharge?  Same   Is the patient/caregiver able to teach back signs and symptoms related to disease process for when to call PCP?  Yes   Is the patient/caregiver able to teach back signs and symptoms related to disease process for when to  call 911?  Yes   Is the patient/caregiver able to teach back the hierarchy of who to call/visit for symptoms/problems? PCP, Specialist, Home health nurse, Urgent Care, ED, 911  Yes   If the patient is a current smoker, are they able to teach back resources for cessation?  Not a smoker   TCM call completed?  Yes   Wrap up additional comments  Patient reports that she was not told to quarantine after this hospitalization.           Ting Guillen RN    12/30/2020, 15:53 EST

## 2021-01-04 ENCOUNTER — TELEMEDICINE (OUTPATIENT)
Dept: INTERNAL MEDICINE | Facility: CLINIC | Age: 46
End: 2021-01-04

## 2021-01-04 VITALS — TEMPERATURE: 97.9 F | BODY MASS INDEX: 28.35 KG/M2 | HEIGHT: 63 IN

## 2021-01-04 DIAGNOSIS — R10.9 RIGHT FLANK PAIN: Primary | ICD-10-CM

## 2021-01-04 DIAGNOSIS — D84.9 IMMUNOCOMPROMISED (HCC): ICD-10-CM

## 2021-01-04 PROCEDURE — 99495 TRANSJ CARE MGMT MOD F2F 14D: CPT | Performed by: PHYSICIAN ASSISTANT

## 2021-01-04 RX ORDER — METHYLPREDNISOLONE 4 MG/1
8 TABLET ORAL DAILY
COMMUNITY
Start: 2020-12-30 | End: 2021-02-25

## 2021-01-04 RX ORDER — AZATHIOPRINE 50 MG/1
150 TABLET ORAL DAILY
COMMUNITY
Start: 2020-12-30 | End: 2021-11-15

## 2021-01-05 ENCOUNTER — PRIOR AUTHORIZATION (OUTPATIENT)
Dept: INTERNAL MEDICINE | Facility: CLINIC | Age: 46
End: 2021-01-05

## 2021-01-05 NOTE — TELEPHONE ENCOUNTER
[employer]ymeds  Key: [id number]  Diclofenac Sodium 1% gel  Status: PA Request    Created: January 4th, 2021 [phone]    Sent: January 5th, 2021

## 2021-01-05 NOTE — PROGRESS NOTES
"Transitional Care Follow Up Visit  Subjective     Umberto Oliva is a 45 y.o. female who presents for a transitional care management visit.    Within 48 business hours after discharge our office contacted her via telephone to coordinate her care and needs.      I reviewed and discussed the details of that call along with the discharge summary, hospital problems, inpatient lab results, inpatient diagnostic studies, and consultation reports with Umberto.     Current outpatient and discharge medications have been reconciled for the patient.  Reviewed by: ARIELLE Hobbs      Date of TCM Phone Call 12/29/2020   [facility]   Date of Admission 12/27/2020   Date of Discharge 12/29/2020   Discharge Disposition Home or Self Care     Risk for Readmission (LACE) Score: 5 (12/29/2020  6:00 AM)      History of Present Illness   Course During Hospital Stay: Patient presents today via video visit for hospital follow-up.  She was admitted at [facility] from 12/27 to 12/29/2020 with chief complaint of right flank pain.  Differential diagnosis included pyelonephritis, nephrolithiasis and renal infarct, however there was no definitive diagnosis upon discharge.  She was advised to alternate ibuprofen and Tylenol for 1 to 2 weeks following discharge as it was thought that her symptoms may be musculoskeletal in nature.  She describes over 1 month of right anterolateral lower rib pain described as soreness with occasional sharp pain triggered by certain movements such as raising the right arm up or taking deep breaths.  Heat helps the pain but Tylenol does not seem to help much.  She was given Toradol a couple of times during admission and also 1 month ago when she was initially seen here for the same concern and notes that Toradol has helped when used.    The following is taken directly from the hospital discharge summary: \"Umberto Oliva is a 45 y.o. female with history of nephrolithiasis, " "presumptive diagnosis of polyarteritis nodosa followed at the [facility] on chronic steroids and Imuran (had nondiagnostic biopsy 2/2020 and unable to have in person follow-up due to pandemic), also recent COVID-19 infection 9/2020 who presented complaining of right upper quadrant/right rib pain for 1 month.  On arrival to the ED she had a hypoattenuating area of the liver as well as area of the left kidney concerning for pyelonephritis versus renal infarct.  Her urinalysis was bland.  Extensive work-up included normal chemistry panel, normal CBC, negative D-dimer, normal inflammatory markers (ESR/CRP), and follow-up abdominal ultrasound showing cyst of the left kidney at area of question and small 1.1 cm simple cyst of the liver.  As it stands there is no clear objective data to point towards life-threatening or systemic process.  Her pain does improve with Toradol/NSAIDs.  I question musculoskeletal process including intercostal strain related to coughing from recent Covid infection.  (Note that COVID-19 nasal swab was positive this admission and believed to be related to her recent infection, she is no new/different symptoms, and nothing to suggest recurrent infection).  I have recommended home ibuprofen/Tylenol alternating for another 2 weeks which will put her in the 6-8-week timeframe that most musculoskeletal issues should improve and that she follow-up with her PCP at that time.\"    She denies any current cough but endorses some SOA which has persisted since having COVID-19 in September 2020.  She has occasional nausea or diarrhea but denies these being postprandial in nature.  She also denies any worsened right upper quadrant/rib pain after eating. She has remained afebrile throughout the course. No dysuria, hematuria, low back pain, lower abdominal pain, increased frequency or urgency.          The following portions of the patient's history were reviewed and updated as appropriate: allergies, " current medications, past family history, past medical history, past social history, past surgical history and problem list.    Review of Systems   Constitutional: Positive for fatigue (chronic). Negative for appetite change, chills, fever and unexpected weight change.   HENT: Negative for congestion, ear pain, hearing loss, nosebleeds, postnasal drip, rhinorrhea, sore throat, tinnitus and trouble swallowing.    Eyes: Negative for photophobia, discharge and visual disturbance.   Respiratory: Positive for shortness of breath. Negative for cough, chest tightness and wheezing.    Cardiovascular: Negative for chest pain, palpitations and leg swelling.        Left upper chest wall discomfort.   Gastrointestinal: Positive for diarrhea and nausea. Negative for abdominal distention, abdominal pain, blood in stool, constipation and vomiting.   Endocrine: Negative for cold intolerance, heat intolerance, polydipsia, polyphagia and polyuria.   Genitourinary: Positive for flank pain. Negative for decreased urine volume, difficulty urinating, dysuria, enuresis, frequency, genital sores, hematuria, menstrual problem, pelvic pain, urgency, vaginal bleeding, vaginal discharge and vaginal pain.   Musculoskeletal: Positive for arthralgias, joint swelling and myalgias. Negative for back pain, gait problem, neck pain and neck stiffness.   Skin: Positive for color change (chronic). Negative for pallor, rash and wound.   Allergic/Immunologic: Positive for immunocompromised state. Negative for food allergies.   Neurological: Negative for dizziness, tremors, seizures, weakness, numbness and headaches.   Hematological: Negative for adenopathy. Does not bruise/bleed easily.   Psychiatric/Behavioral: Negative for agitation, behavioral problems, confusion, hallucinations, self-injury and suicidal ideas. The patient is not nervous/anxious.        Objective   Physical Exam  Vitals signs and nursing note reviewed.   Constitutional:       General:  She is not in acute distress.     Appearance: She is well-developed. She is not ill-appearing, toxic-appearing or diaphoretic.   HENT:      Head: Normocephalic and atraumatic.      Comments: Arauz facies  Pulmonary:      Effort: Pulmonary effort is normal.   Chest:      Chest wall: Tenderness (right lower anterior ribs tender to palpation on exam performed by patient under my direction) present.   Neurological:      Mental Status: She is alert and oriented to person, place, and time.      Coordination: Coordination normal.   Psychiatric:         Behavior: Behavior normal.         Thought Content: Thought content normal.         Judgment: Judgment normal.          Assessment/Plan   Diagnoses and all orders for this visit:    1. Right flank pain (Primary)  -     Diclofenac Sodium (VOLTAREN) 1 % gel gel; Apply 4 g topically to the appropriate area as directed 4 (Four) Times a Day As Needed (rib pain).  Dispense: 50 g; Refill: 1  Continue to use tylenol for pain and begin using Voltaren gel topically to see if effective. May also try Lidocaine patches to see if helpful. If pain fails to improve within 1 week she will notify me. She will also discuss situation with rheumatology as the hospitalist suggested there may be a link between vasculitis she is being treated for and persistent right flank pain.     2. Immunocompromised (CMS/HCC)  Continue care with rheumatology. Continue Imuran and medrol.       Hospital admission record reviewed.   Current outpatient and discharge medications have been reconciled for the patient.  Reviewed by: ARIELLE Hobbs

## 2021-01-29 ENCOUNTER — HOSPITAL ENCOUNTER (OUTPATIENT)
Dept: GENERAL RADIOLOGY | Facility: HOSPITAL | Age: 46
Discharge: HOME | End: 2021-01-29
Admitting: PHYSICIAN ASSISTANT

## 2021-01-29 ENCOUNTER — TELEMEDICINE (OUTPATIENT)
Dept: INTERNAL MEDICINE | Facility: CLINIC | Age: 46
End: 2021-01-29

## 2021-01-29 DIAGNOSIS — M54.6 ACUTE BILATERAL THORACIC BACK PAIN: Primary | ICD-10-CM

## 2021-01-29 DIAGNOSIS — R10.9 RIGHT FLANK PAIN: ICD-10-CM

## 2021-01-29 PROCEDURE — 72070 X-RAY EXAM THORAC SPINE 2VWS: CPT

## 2021-01-29 PROCEDURE — 99214 OFFICE O/P EST MOD 30 MIN: CPT | Performed by: PHYSICIAN ASSISTANT

## 2021-01-29 NOTE — PROGRESS NOTES
You have chosen to receive care through a telehealth visit.  Do you consent to use a video/audio connection for your medical care today? Yes  Active parties: Sharyn Ulloa PA-C, Geovanna Ledbetter CMA, Umberto Oliva    Chief Complaint  Follow-up (DISCUSS MEDICAL LEAVE, RIB PAIN)    Subjective          Umberto Oliva presents to [facility] for follow up of back/flank pain.    History of Present Illness  Patient presents today via video visit for follow-up of back pain and flank pain.  She was initially seen on 12/1/2020 for right flank pain at which time she was found to have 4 mm stone at the right UVJ producing mild right hydronephrosis which was felt to contribute to symptoms.  On 12/27/2020 she was seen in the ED for evaluation of right flank pain at which time there was no evidence of right renal stone or hydronephrosis.  She was admitted for further work-up and the next day underwent CT abdomen pelvis with contrast which was again unrevealing for cause of pain.  Since that time she has been taking Tylenol and ibuprofen which help some short-term but pain always returns.  Today she reports that her pain has not worsened any and remains intermittent, worse when laying down to sleep.  She does not notice any relation of pain with eating.  The pain is predominantly in the right flank but sometimes varies in location.  Last week for a couple of days she had pain nearly constantly in bilateral anterior lower ribs which has since returned to being bothersome only on the right now.  Rib pain is better when sitting upright and is worse with certain movements.  She also has tenderness to palpation in the right flank.  Pain is also sometimes bothersome in her back and posterior ribs.  She has some chronic low back pain with sciatica which is unchanged.  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      Objective   Vital Signs:   There were no vitals taken for this visit.    Physical Exam  Nursing note reviewed.   Constitutional:       General: She is not in acute distress.     Appearance: She is well-developed. She is not diaphoretic.   HENT:      Head: Normocephalic and atraumatic.      Right Ear: External ear normal.      Left Ear: External ear normal.   Eyes:      General: No scleral icterus.     Extraocular Movements: Extraocular movements intact.   Neck:      Musculoskeletal: Normal range of motion.      Comments: Neck appears normal  Pulmonary:      Effort: Pulmonary effort is normal. No respiratory distress.   Abdominal:      Tenderness: There is right CVA tenderness ( On exam performed by patient under provider direction). There is no left CVA tenderness.   Skin:     Coloration: Skin is not pale.      Findings: No erythema.   Neurological:      Mental Status: She is alert and oriented to person, place, and time.      Coordination: Coordination normal.   Psychiatric:         Attention and Perception: Attention and perception normal.         Mood and Affect: Mood normal. Mood is not anxious.         Speech: Speech normal.         Behavior: Behavior normal. Behavior is cooperative.         Thought Content: Thought content normal.         Cognition and Memory: Cognition and memory normal.         Judgment: Judgment normal.        Result Review :   The following data was reviewed by: ARIELLE Hobbs on 01/29/2021:  Common labs    Common Labsle 11/5/20 11/5/20 12/27/20 12/27/20 12/27/20 12/28/20 12/28/20    1559 1559 1901 1901 1901 1127 1127   BUN  10 13  13  7   Creatinine  0.62 0.77  0.75  0.73   eGFR Non  Am  105 82  84  87   Sodium  142 142  138  144   Potassium  3.9 3.7  3.6  3.8   Chloride  104 104  101  109 (A)   Calcium  9.4 9.1  9.2  8.7   Albumin  4.50   4.10     Total Bilirubin  0.5   0.4     Alkaline Phosphatase  66   75     AST (SGOT)  54 (A)   24      ALT (SGPT)  73 (A)   22     WBC 6.68   7.44  4.77    Hemoglobin 12.5   12.1  12.1    Hematocrit 36.4   38.7  37.8    Platelets 357   320  280    (A) Abnormal value       Comments are available for some flowsheets but are not being displayed.           Data reviewed: Radiologic studies multiple CT abdomen and pelvis and Recent hospitalization notes 12/27/2020          Assessment and Plan    Problem List Items Addressed This Visit        Gastrointestinal Abdominal     Right flank pain    Relevant Orders    XR spine thoracic 2 vw      Other Visit Diagnoses     Acute bilateral thoracic back pain    -  Primary    Relevant Orders    XR spine thoracic 2 vw    Etiology of pain remains unclear.  She has already undergone extensive imaging of the abdomen and pelvis and at this time we will move forward with imaging of the spine.  Will consider thoracic MRI if x-ray is abnormal.  If x-ray is unremarkable she may consider a trial of chiropractic care.    She was released back to work as of 2/1/2021 with restrictions of no lifting, pushing or pulling greater than 10 pounds until 3/1/2021.        I spent 30 minutes caring for Umberto on this date of service. This time includes time spent by me in the following activities:preparing for the visit, reviewing tests, obtaining and/or reviewing a separately obtained history, performing a medically appropriate examination and/or evaluation , counseling and educating the patient/family/caregiver, ordering medications, tests, or procedures and documenting information in the medical record  Follow Up   Return if symptoms worsen or fail to improve.  Patient was given instructions and counseling regarding her condition or for health maintenance advice. Please see specific information pulled into the AVS if appropriate.

## 2021-02-01 DIAGNOSIS — R10.9 RIGHT FLANK PAIN: Primary | ICD-10-CM

## 2021-02-01 DIAGNOSIS — R10.11 RUQ ABDOMINAL PAIN: ICD-10-CM

## 2021-02-08 ENCOUNTER — HOSPITAL ENCOUNTER (OUTPATIENT)
Dept: NUCLEAR MEDICINE | Facility: HOSPITAL | Age: 46
Discharge: HOME | End: 2021-02-08

## 2021-02-08 DIAGNOSIS — R94.8 ABNORMAL BILIARY HIDA SCAN: Primary | ICD-10-CM

## 2021-02-08 DIAGNOSIS — R10.9 RIGHT FLANK PAIN: ICD-10-CM

## 2021-02-08 PROCEDURE — 0 TECHNETIUM TC 99M MEBROFENIN KIT: Performed by: PHYSICIAN ASSISTANT

## 2021-02-08 PROCEDURE — 25010000002 SINCALIDE PER 5 MCG: Performed by: PHYSICIAN ASSISTANT

## 2021-02-08 PROCEDURE — A9537 TC99M MEBROFENIN: HCPCS | Performed by: PHYSICIAN ASSISTANT

## 2021-02-08 PROCEDURE — 78227 HEPATOBIL SYST IMAGE W/DRUG: CPT

## 2021-02-08 RX ORDER — KIT FOR THE PREPARATION OF TECHNETIUM TC 99M MEBROFENIN 45 MG/10ML
1 INJECTION, POWDER, LYOPHILIZED, FOR SOLUTION INTRAVENOUS
Status: COMPLETED | OUTPATIENT
Start: 2021-02-08 | End: 2021-02-08

## 2021-02-08 RX ADMIN — MEBROFENIN 1 DOSE: 45 INJECTION, POWDER, LYOPHILIZED, FOR SOLUTION INTRAVENOUS at 10:56

## 2021-02-08 RX ADMIN — SINCALIDE 1.5 MCG: 5 INJECTION, POWDER, LYOPHILIZED, FOR SOLUTION INTRAVENOUS at 11:50

## 2021-02-10 ENCOUNTER — TELEPHONE (OUTPATIENT)
Dept: INTERNAL MEDICINE | Facility: CLINIC | Age: 46
End: 2021-02-10

## 2021-02-10 NOTE — TELEPHONE ENCOUNTER
Patient did not complete mammogram ordered on 02/07/2020. This order is too old to be used. May I cancel the order?

## 2021-02-25 ENCOUNTER — OFFICE VISIT (OUTPATIENT)
Dept: GASTROENTEROLOGY | Facility: CLINIC | Age: 46
End: 2021-02-25

## 2021-02-25 VITALS
SYSTOLIC BLOOD PRESSURE: 126 MMHG | OXYGEN SATURATION: 98 % | DIASTOLIC BLOOD PRESSURE: 90 MMHG | HEART RATE: 107 BPM | HEIGHT: 63 IN | TEMPERATURE: 97.3 F | WEIGHT: 158 LBS | BODY MASS INDEX: 28 KG/M2

## 2021-02-25 DIAGNOSIS — K91.30 SMALL BOWEL OBSTRUCTION DUE TO POSTOPERATIVE ADHESIONS: ICD-10-CM

## 2021-02-25 DIAGNOSIS — K80.50 BILIARY COLIC: Primary | ICD-10-CM

## 2021-02-25 PROCEDURE — 99214 OFFICE O/P EST MOD 30 MIN: CPT | Performed by: NURSE PRACTITIONER

## 2021-02-25 NOTE — PROGRESS NOTES
"     New Patient Consultation     Patient Name: Umberto Oliva  : 1975   MRN: [id number]     Chief Complaint:    Chief Complaint   Patient presents with   • Consult     abdominal pain       History of Present Illness: Umberto Oliva is a 46 y.o. female who is here today for a Gastroenterology Consultation for RUQ pain.  Work-up thus far has included CT abdomen, labs, abdominal ultrasound, and HIDA scan.  HIDA scan reveals gallbladder ejection fraction of 3%. HIDA scan did not reproduce symptoms.  Patient was initially referred by PCP to a general surgeon but patient wanted to consult gastroenterology before pursuing surgery.  She has had multiple abdominal surgeries and is fearful of undergoing another surgery unless it is truly necessary.    RUQ pain began about 3 months ago.  Did have a kidney stone at the time on CT on the right side. When she was still having pain she returned to the ED and the stone had passed.  Pain is intermittent and can be severe.  It can be worse with inspiration and sometimes is tender to touch.  NSAIDs do help some. She has not related the pain to after she is eating.  She is also experiencing nausea and has constipation. There is some mucous in her stool.  No blood in stool.  No fam hx of IBD.  Personal hx of autoimmune vasculitis.  No \"bowel issues\" before this time.    Hx of bowel obstruction secondary to scar adhesions at age 15.  This required several revisions.  No bowel removal at time.  Has also had several Lap surgeries for endometriosis and ovarian cysts.     Subjective      Review of Systems:   Review of Systems   Constitutional: Negative for appetite change and unexpected weight loss.   HENT: Negative for trouble swallowing.    Gastrointestinal: Positive for abdominal pain, constipation and nausea. Negative for abdominal distention, anal bleeding, blood in stool, diarrhea, rectal pain, vomiting, GERD and indigestion.       Past Medical History:   Past Medical History: "   Diagnosis Date   • Arthritis    • Endometriosis    • Headache    • Kidney stone    • Kidney stones    • Migraines    • Ovarian cyst    • Rheumatoid arthritis (CMS/HCC)    • Vasculitis (CMS/HCC)        Past Surgical History:   Past Surgical History:   Procedure Laterality Date   • COLON SURGERY      BOWEL BLOCKAGE  AGE 15   • DIAGNOSTIC LAPAROSCOPY      TIMES 4   • EXPLORATORY LAPAROTOMY  2006   • HYSTERECTOMY  07/24/2012    [facility]       Family History:   Family History   Problem Relation Age of Onset   • Osteoarthritis Mother    • Kidney disease Mother    • Migraines Mother    • Thyroid disease Mother    • Hypertension Father    • Diabetes Paternal Grandmother    • Diabetes Maternal Grandfather    • Kidney disease Maternal Grandfather    • Stroke Maternal Grandfather    • Stroke Maternal Grandmother        Social History:   Social History     Socioeconomic History   • Marital status:      Spouse name: Not on file   • Number of children: Not on file   • Years of education: Not on file   • Highest education level: Not on file   Tobacco Use   • Smoking status: Never Smoker   • Smokeless tobacco: Never Used   Substance and Sexual Activity   • Alcohol use: No   • Drug use: No   • Sexual activity: Yes     Partners: Male     Birth control/protection: Surgical       Alcohol/Tobacco History:   Social History     Substance and Sexual Activity   Alcohol Use No     Social History     Tobacco Use   Smoking Status Never Smoker   Smokeless Tobacco Never Used       Medications:     Current Outpatient Medications:   •  azaTHIOprine (IMURAN) 50 MG tablet, Take 150 mg by mouth Daily., Disp: , Rfl:   •  cyanocobalamin 1000 MCG/ML injection, Inject 1 mL into the appropriate muscle as directed by prescriber 1 (One) Time Per Week for 30 days, THEN 1 mL Every 28 (Twenty-Eight) Days for 335 days., Disp: 10 mL, Rfl: 1  •  fluticasone (Flonase) 50 MCG/ACT nasal spray, 2 sprays into the nostril(s) as directed by provider Daily.  "(Patient taking differently: 2 sprays into the nostril(s) as directed by provider Daily As Needed.), Disp: 15.8 mL, Rfl: 1  •  meclizine (ANTIVERT) 12.5 MG tablet, Take 1 tablet by mouth 3 (Three) Times a Day As Needed for Dizziness or Nausea., Disp: 18 tablet, Rfl: 0  •  methylPREDNISolone (MEDROL) 4 MG tablet, Take 8 mg by mouth Daily. Taking 1.5 , Disp: , Rfl:   •  promethazine (PHENERGAN) 12.5 MG tablet, Take 1 tablet by mouth Every 6 (Six) Hours As Needed for Nausea or Vomiting. May take 2 pills per dose if needed., Disp: 30 tablet, Rfl: 0  •  SUMAtriptan (IMITREX) 100 MG tablet, Take 1 tablet by mouth 1 (One) Time As Needed for Migraine (may repeat once in 2 hours if needed)., Disp: 9 tablet, Rfl: 5  •  Syringe/Needle, Disp, 25G X 5/8\" 1 ML misc, 1 each As Needed (use with B12 injections)., Disp: 50 each, Rfl: 1  •  Vitamin D, Cholecalciferol, (CHOLECALCIFEROL) 10 MCG (400 UNIT) tablet, Take 400 Units by mouth Daily., Disp: , Rfl:     Allergies:   No Known Allergies    Objective     Physical Exam:  Vital Signs:   Vitals:    02/25/21 1512   BP: 126/90   BP Location: Right arm   Patient Position: Sitting   Cuff Size: Adult   Pulse: 107   Temp: 97.3 °F (36.3 °C)   TempSrc: Temporal   SpO2: 98%   Weight: 71.7 kg (158 lb)   Height: 160 cm (63\")   PainSc:   6   PainLoc: Abdomen     Body mass index is 27.99 kg/m².     Physical Exam  Vitals signs and nursing note reviewed.   Constitutional:       General: She is not in acute distress.     Appearance: She is well-developed. She is not diaphoretic.   Eyes:      General: No scleral icterus.     Extraocular Movements:      Right eye: No nystagmus.      Left eye: No nystagmus.      Conjunctiva/sclera: Conjunctivae normal.      Pupils: Pupils are equal, round, and reactive to light.   Neck:      Musculoskeletal: Neck supple.      Thyroid: No thyromegaly.   Cardiovascular:      Rate and Rhythm: Normal rate and regular rhythm.   Pulmonary:      Effort: Pulmonary effort is " normal.      Breath sounds: Normal breath sounds.   Abdominal:      General: A surgical scar is present. Bowel sounds are normal. There is no distension. There are no signs of injury.      Palpations: Abdomen is soft. There is no hepatomegaly or splenomegaly.      Tenderness: There is abdominal tenderness in the right upper quadrant. There is no rebound. Positive signs include Dean's sign. Negative signs include Rovsing's sign, McBurney's sign and psoas sign.      Hernia: No hernia is present.   Musculoskeletal:      Right lower leg: No edema.      Left lower leg: No edema.   Skin:     General: Skin is warm and dry.      Capillary Refill: Capillary refill takes 2 to 3 seconds.      Coloration: Skin is not jaundiced or pale.      Findings: No bruising or petechiae.      Nails: There is no clubbing.     Neurological:      Mental Status: She is alert and oriented to person, place, and time.   Psychiatric:         Behavior: Behavior normal.         Thought Content: Thought content normal.         Judgment: Judgment normal.       Reviewed referring provider notes, lab results, recent hospital stay progress notes,imaging  Assessment / Plan      Assessment/Plan:   Diagnoses and all orders for this visit:    1. Biliary colic (Primary)  -     Ambulatory Referral to General Surgery  Right upper quadrant pain most likely due to gallbladder dysfunction but given her history would like to get CTE to rule out adhesion and obstruction.  If her pain does not resolve with Margarito, return to clinic and will pursue EGD  2. Small bowel obstruction due to postoperative adhesions  -     CT Abdomen Pelvis With & Without Contrast Enterography           Follow Up:   Return if symptoms worsen or fail to improve.    Plan of care reviewed with the patient at the conclusion of today's visit.  Education was provided regarding diagnosis, management, and any prescribed or recommended OTC medications.  Patient verbalized understanding of and  agreement with management plan.       CHELI Wing  [facility]     Please note that portions of this note may have been completed with a voice recognition program. Efforts were made to edit the dictations, but occasionally words are mistranscribed.

## 2021-03-05 ENCOUNTER — HOSPITAL ENCOUNTER (OUTPATIENT)
Dept: CT IMAGING | Facility: HOSPITAL | Age: 46
Discharge: HOME | End: 2021-03-05
Admitting: NURSE PRACTITIONER

## 2021-03-05 PROCEDURE — 74178 CT ABD&PLV WO CNTR FLWD CNTR: CPT

## 2021-03-05 PROCEDURE — 25010000002 IOPAMIDOL 61 % SOLUTION: Performed by: NURSE PRACTITIONER

## 2021-03-05 RX ADMIN — IOPAMIDOL 100 ML: 612 INJECTION, SOLUTION INTRAVENOUS at 19:58

## 2021-03-05 RX ADMIN — BARIUM SULFATE 1800 ML: 1 SUSPENSION ORAL at 19:58

## 2021-03-19 ENCOUNTER — OFFICE VISIT (OUTPATIENT)
Dept: SURGERY | Facility: CLINIC | Age: 46
End: 2021-03-19

## 2021-03-19 VITALS
SYSTOLIC BLOOD PRESSURE: 116 MMHG | BODY MASS INDEX: 27.68 KG/M2 | HEART RATE: 89 BPM | DIASTOLIC BLOOD PRESSURE: 74 MMHG | OXYGEN SATURATION: 99 % | TEMPERATURE: 96.9 F | HEIGHT: 63 IN | WEIGHT: 156.2 LBS

## 2021-03-19 DIAGNOSIS — G89.29 CHRONIC ABDOMINAL PAIN: ICD-10-CM

## 2021-03-19 DIAGNOSIS — K21.9 GASTROESOPHAGEAL REFLUX DISEASE, UNSPECIFIED WHETHER ESOPHAGITIS PRESENT: Primary | ICD-10-CM

## 2021-03-19 DIAGNOSIS — R94.8 ABNORMAL BILIARY HIDA SCAN: ICD-10-CM

## 2021-03-19 DIAGNOSIS — R10.9 CHRONIC ABDOMINAL PAIN: ICD-10-CM

## 2021-03-19 PROCEDURE — 99204 OFFICE O/P NEW MOD 45 MIN: CPT | Performed by: SURGERY

## 2021-03-19 RX ORDER — PANTOPRAZOLE SODIUM 40 MG/1
40 TABLET, DELAYED RELEASE ORAL DAILY
Qty: 30 TABLET | Refills: 0 | Status: SHIPPED | OUTPATIENT
Start: 2021-03-19 | End: 2021-06-25

## 2021-04-16 ENCOUNTER — TRANSCRIBE ORDERS (OUTPATIENT)
Dept: LAB | Facility: HOSPITAL | Age: 46
End: 2021-04-16

## 2021-04-16 DIAGNOSIS — Z01.818 PRE-OP EXAM: Primary | ICD-10-CM

## 2021-04-19 ENCOUNTER — LAB (OUTPATIENT)
Dept: LAB | Facility: HOSPITAL | Age: 46
End: 2021-04-19

## 2021-04-19 DIAGNOSIS — Z01.818 PRE-OP EXAM: ICD-10-CM

## 2021-04-19 PROCEDURE — C9803 HOPD COVID-19 SPEC COLLECT: HCPCS

## 2021-04-19 PROCEDURE — U0004 COV-19 TEST NON-CDC HGH THRU: HCPCS

## 2021-04-20 LAB — SARS-COV-2 RNA NOSE QL NAA+PROBE: NOT DETECTED

## 2021-04-22 ENCOUNTER — OUTSIDE FACILITY SERVICE (OUTPATIENT)
Dept: GASTROENTEROLOGY | Facility: CLINIC | Age: 46
End: 2021-04-22

## 2021-04-22 PROCEDURE — 88305 TISSUE EXAM BY PATHOLOGIST: CPT | Performed by: INTERNAL MEDICINE

## 2021-04-22 PROCEDURE — 43239 EGD BIOPSY SINGLE/MULTIPLE: CPT | Performed by: INTERNAL MEDICINE

## 2021-04-23 ENCOUNTER — LAB REQUISITION (OUTPATIENT)
Dept: LAB | Facility: HOSPITAL | Age: 46
End: 2021-04-23

## 2021-04-23 DIAGNOSIS — K21.9 GASTRO-ESOPHAGEAL REFLUX DISEASE WITHOUT ESOPHAGITIS: ICD-10-CM

## 2021-04-23 DIAGNOSIS — R10.11 RIGHT UPPER QUADRANT PAIN: ICD-10-CM

## 2021-04-26 LAB
CYTO UR: NORMAL
LAB AP CASE REPORT: NORMAL
LAB AP CLINICAL INFORMATION: NORMAL
PATH REPORT.FINAL DX SPEC: NORMAL
PATH REPORT.GROSS SPEC: NORMAL

## 2021-06-03 ENCOUNTER — TELEPHONE (OUTPATIENT)
Dept: SURGERY | Facility: CLINIC | Age: 46
End: 2021-06-03

## 2021-06-03 NOTE — TELEPHONE ENCOUNTER
Tried to contact the patient about rescheduling an appointment from 04/20/2021 with dr laurent. Will send a message

## 2021-06-18 DIAGNOSIS — G43.C0 PERIODIC HEADACHE SYNDROME, NOT INTRACTABLE: ICD-10-CM

## 2021-06-18 RX ORDER — SUMATRIPTAN 100 MG/1
100 TABLET, FILM COATED ORAL ONCE AS NEEDED
Qty: 9 TABLET | Refills: 5 | Status: SHIPPED | OUTPATIENT
Start: 2021-06-18

## 2021-06-25 ENCOUNTER — OFFICE VISIT (OUTPATIENT)
Dept: INTERNAL MEDICINE | Facility: CLINIC | Age: 46
End: 2021-06-25

## 2021-06-25 ENCOUNTER — TELEPHONE (OUTPATIENT)
Dept: ENDOCRINOLOGY | Facility: CLINIC | Age: 46
End: 2021-06-25

## 2021-06-25 VITALS
SYSTOLIC BLOOD PRESSURE: 120 MMHG | DIASTOLIC BLOOD PRESSURE: 74 MMHG | HEART RATE: 88 BPM | TEMPERATURE: 97.1 F | HEIGHT: 63 IN | WEIGHT: 152 LBS | OXYGEN SATURATION: 99 % | BODY MASS INDEX: 26.93 KG/M2

## 2021-06-25 DIAGNOSIS — B35.1 NAIL FUNGUS: ICD-10-CM

## 2021-06-25 DIAGNOSIS — M06.4 INFLAMMATORY POLYARTHROPATHY (HCC): Primary | ICD-10-CM

## 2021-06-25 DIAGNOSIS — M25.531 RIGHT WRIST PAIN: ICD-10-CM

## 2021-06-25 DIAGNOSIS — M77.21 INFLAMMATION AROUND WRIST, RIGHT: ICD-10-CM

## 2021-06-25 DIAGNOSIS — E27.40 ADRENAL INSUFFICIENCY (HCC): Primary | ICD-10-CM

## 2021-06-25 PROCEDURE — 99215 OFFICE O/P EST HI 40 MIN: CPT | Performed by: PHYSICIAN ASSISTANT

## 2021-06-25 RX ORDER — OMEPRAZOLE 20 MG/1
20 CAPSULE, DELAYED RELEASE ORAL DAILY
COMMUNITY
Start: 2021-06-07

## 2021-06-25 NOTE — TELEPHONE ENCOUNTER
PT IS SCHEDULED FOR AN APPT 12:45 7-13 TO SEE DR COTE  SHE WANTED TO KNOW IF SHE COULD GO AHEAD AND COME IN FOR THE LAB TEST SHE WANTED HER TO DO    PLEASE CALL THE PT  [phone]

## 2021-06-25 NOTE — PROGRESS NOTES
Follow Up Office Visit      Patient Name: Umberto Oliva  : 1975   MRN: [id number]     Chief Complaint:    Chief Complaint   Patient presents with   • Wrist Pain     R wrist pain       History of Present Illness: Umberto Oliva is a 46 y.o. female who is here today with concern of right wrist pain. She has been having pain and tenderness in bilateral lower arms, wrists and sometimes hands since early May. The right wrist is the most painful location.  She has to use 2 hands to lift her purse due to more bothersome pain in the wrist wrist in certain positions. Some days are worse than others. The right wrist sometimes appears swollen, non-pitting. No numbness or tingling and no neck pain. She has been taking ibuprofen which helps some   No previous similar occurrences in the wrists but she does have inflammatory polyarthropathy with suspected vasculitis managed by [facility]. She is currently taking Medrol 4 mg daily which was reduced in April.     She has noticed discoloration of one nail long-term but in the last coupleof weeks noticed acute onset discoloration and thickened appearance of the right 1st and 2nd toenails. The area surrounding the nails was also discolored at first until she began using an OTC fungal treatment. She will discuss this with her dermatologist.         Subjective      I have reviewed and the following portions of the patient's history were updated as appropriate: past family history, past medical history, past social history, past surgical history and problem list.      Current Outpatient Medications:   •  albuterol sulfate  (90 Base) MCG/ACT inhaler, Inhale 2 puffs Every 4 (Four) Hours As Needed for Wheezing., Disp: 8 g, Rfl: 0  •  azaTHIOprine (IMURAN) 50 MG tablet, Take 150 mg by mouth Daily., Disp: , Rfl:   •  cyanocobalamin 1000 MCG/ML injection, Inject 1 mL into the appropriate muscle as directed by prescriber 1 (One) Time Per Week for 30 days, THEN 1 mL Every  "28 (Twenty-Eight) Days for 335 days., Disp: 10 mL, Rfl: 1  •  fluticasone (FLONASE) 50 MCG/ACT nasal spray, 2 sprays into the nostril(s) as directed by provider Daily., Disp: , Rfl:   •  ipratropium-albuterol (DUO-NEB) 0.5-2.5 mg/3 ml nebulizer, Take 3 mL by nebulization Every 4 (Four) Hours As Needed for Wheezing., Disp: 120 mL, Rfl: 0  •  methylPREDNISolone (MEDROL) 4 MG tablet, Take 4 mg by mouth Daily., Disp: , Rfl:   •  omeprazole (priLOSEC) 20 MG capsule, Take 20 mg by mouth Daily., Disp: , Rfl:   •  promethazine (PHENERGAN) 12.5 MG tablet, Take 1 tablet by mouth Every 6 (Six) Hours As Needed for Nausea or Vomiting. May take 2 pills per dose if needed., Disp: 30 tablet, Rfl: 0  •  SUMAtriptan (IMITREX) 100 MG tablet, Take 1 tablet by mouth 1 (One) Time As Needed for Migraine (may repeat once in 2 hours if needed)., Disp: 9 tablet, Rfl: 5  •  Syringe/Needle, Disp, 25G X 5/8\" 1 ML misc, 1 each As Needed (use with B12 injections)., Disp: 50 each, Rfl: 1  •  Vitamin D, Cholecalciferol, (CHOLECALCIFEROL) 10 MCG (400 UNIT) tablet, Take 400 Units by mouth Daily., Disp: , Rfl:     No Known Allergies    Objective     Physical Exam:  Vital Signs:   Vitals:    06/25/21 0803   BP: 120/74   Pulse: 88   Temp: 97.1 °F (36.2 °C)   SpO2: 99%   Weight: 68.9 kg (152 lb)   Height: 160 cm (62.99\")     Body mass index is 26.93 kg/m².    Physical Exam  Vitals and nursing note reviewed.   Constitutional:       General: She is awake. She is not in acute distress.     Appearance: She is well-developed and overweight. She is not ill-appearing, toxic-appearing or diaphoretic.      Interventions: Face mask in place.   HENT:      Head: Normocephalic and atraumatic.      Right Ear: External ear normal.      Left Ear: External ear normal.   Eyes:      General: No scleral icterus.     Extraocular Movements: Extraocular movements intact.      Conjunctiva/sclera: Conjunctivae normal.      Pupils: Pupils are equal, round, and reactive to light. "   Cardiovascular:      Rate and Rhythm: Normal rate and regular rhythm.      Heart sounds: Normal heart sounds. No murmur heard.   No friction rub. No gallop.    Pulmonary:      Effort: Pulmonary effort is normal. No respiratory distress.      Breath sounds: Normal breath sounds. No wheezing, rhonchi or rales.   Chest:      Chest wall: No tenderness.   Abdominal:      General: Bowel sounds are normal.      Palpations: Abdomen is soft.      Tenderness: There is no abdominal tenderness.   Musculoskeletal:         General: No deformity.      Right forearm: Tenderness (mid and distal forearm generalized tenderness) present. No swelling, edema, deformity, lacerations or bony tenderness.      Left forearm: No swelling, edema, deformity, lacerations, tenderness or bony tenderness.      Right wrist: Swelling (mild) and tenderness (some tenderness medially and laterally) present. No deformity, effusion, lacerations, bony tenderness, snuff box tenderness or crepitus. Decreased range of motion (pain with flexion and extension). Normal pulse.      Left wrist: No swelling, deformity, effusion, tenderness, bony tenderness or crepitus. Normal range of motion. Normal pulse.      Cervical back: Normal range of motion and neck supple.      Right lower leg: No edema.      Left lower leg: No edema.        Feet:    Feet:      Right foot:      Toenail Condition: Fungal disease present.     Left foot:      Toenail Condition: Fungal disease present.  Skin:     General: Skin is warm and dry.      Capillary Refill: Capillary refill takes less than 2 seconds.      Coloration: Skin is not jaundiced or pale.      Findings: Erythema present. No rash.          Neurological:      General: No focal deficit present.      Mental Status: She is alert and oriented to person, place, and time.      Cranial Nerves: No cranial nerve deficit.      Sensory: No sensory deficit.      Motor: No abnormal muscle tone.      Coordination: Coordination normal.       Gait: Gait normal.      Deep Tendon Reflexes: Reflexes normal.   Psychiatric:         Mood and Affect: Mood normal.         Behavior: Behavior normal. Behavior is cooperative.         Thought Content: Thought content normal.         Judgment: Judgment normal.         Common labs    Common Labsle 11/5/20 11/5/20 12/27/20 12/27/20 12/27/20 12/28/20 12/28/20    1559 1559 1901 1901 1901 1127 1127   Glucose  108 (A) 104 (A)  101 (A)  89   BUN  10 13  13  7   Creatinine  0.62 0.77  0.75  0.73   eGFR Non  Am  105 82  84  87   Sodium  142 142  138  144   Potassium  3.9 3.7  3.6  3.8   Chloride  104 104  101  109 (A)   Calcium  9.4 9.1  9.2  8.7   Albumin  4.50   4.10     Total Bilirubin  0.5   0.4     Alkaline Phosphatase  66   75     AST (SGOT)  54 (A)   24     ALT (SGPT)  73 (A)   22     WBC 6.68   7.44  4.77    Hemoglobin 12.5   12.1  12.1    Hematocrit 36.4   38.7  37.8    Platelets 357   320  280    (A) Abnormal value       Comments are available for some flowsheets but are not being displayed.               Assessment / Plan      Assessment/Plan:   Diagnoses and all orders for this visit:    1. Inflammatory polyarthropathy (CMS/HCC) (Primary)  Continue ibuprofen prn. Recommended she discuss new bilateral forearm and wrist pain with specialist at [facility] as this may warrant higher dose of Medrol at least short term. She declined dose increase today. Right wrist brace provided for use as needed when pain is worse.     2. Right wrist pain  -     Miscellaneous DME    3. Inflammation around wrist, right  -     Miscellaneous DME    4. Nail fungus  She will discuss with dermatologist in the meantime may continue OTC treatment.         I spent 41 minutes caring for Umberto on this date of service. This time includes time spent by me in the following activities:preparing for the visit, reviewing tests, performing a medically appropriate examination and/or evaluation , counseling and educating the  patient/family/caregiver, documenting information in the medical record and care coordination    Follow Up:   Return if symptoms worsen or fail to improve.    Patient was given instructions and counseling regarding her condition or for health maintenance advice. Please see specific information pulled into the AVS if appropriate.     Sharyn Ulloa PA-C  [facility]     Please note that portions of this note may have been completed with a voice recognition program. Efforts were made to edit the dictations, but occasionally words are mistranscribed.

## 2021-06-25 NOTE — TELEPHONE ENCOUNTER
Informed pt of lab orders in chart, pt will go to the [facility] [place] to have test done.

## 2021-07-06 ENCOUNTER — HOSPITAL ENCOUNTER (OUTPATIENT)
Dept: GENERAL RADIOLOGY | Facility: HOSPITAL | Age: 46
Discharge: HOME | End: 2021-07-06
Admitting: PHYSICIAN ASSISTANT

## 2021-07-06 DIAGNOSIS — M25.531 RIGHT WRIST PAIN: ICD-10-CM

## 2021-07-06 DIAGNOSIS — M77.21 INFLAMMATION AROUND WRIST, RIGHT: ICD-10-CM

## 2021-07-06 DIAGNOSIS — M25.531 RIGHT WRIST PAIN: Primary | ICD-10-CM

## 2021-07-06 DIAGNOSIS — M85.60 SUBCHONDRAL BONE CYST: ICD-10-CM

## 2021-07-06 PROCEDURE — 73110 X-RAY EXAM OF WRIST: CPT

## 2021-07-08 ENCOUNTER — LAB (OUTPATIENT)
Dept: LAB | Facility: HOSPITAL | Age: 46
End: 2021-07-08

## 2021-07-08 ENCOUNTER — TELEPHONE (OUTPATIENT)
Dept: ENDOCRINOLOGY | Facility: CLINIC | Age: 46
End: 2021-07-08

## 2021-07-08 ENCOUNTER — TRANSCRIBE ORDERS (OUTPATIENT)
Dept: LAB | Facility: HOSPITAL | Age: 46
End: 2021-07-08

## 2021-07-08 DIAGNOSIS — I77.6 VASCULITIS (HCC): Primary | ICD-10-CM

## 2021-07-08 DIAGNOSIS — I77.6 VASCULITIS (HCC): ICD-10-CM

## 2021-07-08 DIAGNOSIS — E27.40 ADRENAL INSUFFICIENCY (HCC): ICD-10-CM

## 2021-07-08 LAB
ALBUMIN SERPL-MCNC: 3.9 G/DL (ref 3.5–5.2)
ALBUMIN/GLOB SERPL: 1.3 G/DL
ALP SERPL-CCNC: 79 U/L (ref 39–117)
ALT SERPL W P-5'-P-CCNC: 14 U/L (ref 1–33)
ANION GAP SERPL CALCULATED.3IONS-SCNC: 14.8 MMOL/L (ref 5–15)
AST SERPL-CCNC: 29 U/L (ref 1–32)
BILIRUB SERPL-MCNC: 0.5 MG/DL (ref 0–1.2)
BUN SERPL-MCNC: 9 MG/DL (ref 6–20)
BUN/CREAT SERPL: 12.9 (ref 7–25)
CALCIUM SPEC-SCNC: 9.3 MG/DL (ref 8.6–10.5)
CHLORIDE SERPL-SCNC: 102 MMOL/L (ref 98–107)
CO2 SERPL-SCNC: 23.2 MMOL/L (ref 22–29)
CORTIS SERPL-MCNC: 10.72 MCG/DL
CORTIS SERPL-MCNC: 13.01 MCG/DL
CREAT SERPL-MCNC: 0.7 MG/DL (ref 0.57–1)
ERYTHROCYTE [SEDIMENTATION RATE] IN BLOOD: 32 MM/HR (ref 0–20)
GFR SERPL CREATININE-BSD FRML MDRD: 91 ML/MIN/1.73
GLOBULIN UR ELPH-MCNC: 2.9 GM/DL
GLUCOSE SERPL-MCNC: 119 MG/DL (ref 65–99)
POTASSIUM SERPL-SCNC: 4.1 MMOL/L (ref 3.5–5.2)
PROT SERPL-MCNC: 6.8 G/DL (ref 6–8.5)
SODIUM SERPL-SCNC: 140 MMOL/L (ref 136–145)

## 2021-07-08 PROCEDURE — 82533 TOTAL CORTISOL: CPT

## 2021-07-08 PROCEDURE — 85652 RBC SED RATE AUTOMATED: CPT

## 2021-07-08 PROCEDURE — 36415 COLL VENOUS BLD VENIPUNCTURE: CPT

## 2021-07-08 PROCEDURE — 80053 COMPREHEN METABOLIC PANEL: CPT

## 2021-07-08 NOTE — TELEPHONE ENCOUNTER
Call Suhas, no answer.  Phone just kept on rining and I was unable to leave a message.      -Wanted to let Suhas know that the pt will come to this lab here in the office and have the cortisol lab drawn here.  Lab test needs to be complete and correctly done.

## 2021-07-08 NOTE — TELEPHONE ENCOUNTER
Suhas with the lab at [facility] called about this patient. She said patient was in and had cortisol levels drawn today but the 60 minute was drawn in the wrong tube.    She asked if Dr. Durán would just take the baseline and 30 minute? If not, she will call the patient to come back in and have 60 minute redone.     Please advise and give Suhas a call back at [phone]. Thank you!

## 2021-07-09 ENCOUNTER — LAB (OUTPATIENT)
Dept: ENDOCRINOLOGY | Facility: CLINIC | Age: 46
End: 2021-07-09

## 2021-07-09 ENCOUNTER — LAB (OUTPATIENT)
Dept: LAB | Facility: HOSPITAL | Age: 46
End: 2021-07-09

## 2021-07-09 ENCOUNTER — CLINICAL SUPPORT (OUTPATIENT)
Dept: ENDOCRINOLOGY | Facility: CLINIC | Age: 46
End: 2021-07-09

## 2021-07-09 DIAGNOSIS — R79.89 LOW SERUM ADRENOCORTICOTROPHIC HORMONE (ACTH): ICD-10-CM

## 2021-07-09 DIAGNOSIS — E27.40 ADRENAL INSUFFICIENCY (HCC): ICD-10-CM

## 2021-07-09 DIAGNOSIS — R53.83 FATIGUE, UNSPECIFIED TYPE: ICD-10-CM

## 2021-07-09 DIAGNOSIS — E27.40 ADRENAL INSUFFICIENCY (HCC): Primary | ICD-10-CM

## 2021-07-09 LAB
CORTIS SERPL-MCNC: 13.94 MCG/DL
CORTIS SERPL-MCNC: 16.31 MCG/DL
CORTIS SERPL-MCNC: 3.21 MCG/DL

## 2021-07-09 PROCEDURE — 96372 THER/PROPH/DIAG INJ SC/IM: CPT | Performed by: INTERNAL MEDICINE

## 2021-07-09 PROCEDURE — 82533 TOTAL CORTISOL: CPT | Performed by: INTERNAL MEDICINE

## 2021-07-09 PROCEDURE — 82533 TOTAL CORTISOL: CPT

## 2021-07-09 RX ORDER — COSYNTROPIN 0.25 MG/ML
0.25 INJECTION, POWDER, FOR SOLUTION INTRAMUSCULAR; INTRAVENOUS ONCE
Status: COMPLETED | OUTPATIENT
Start: 2021-07-09 | End: 2021-07-09

## 2021-07-09 RX ADMIN — COSYNTROPIN 0.25 MG: 0.25 INJECTION, POWDER, FOR SOLUTION INTRAMUSCULAR; INTRAVENOUS at 09:16

## 2021-07-13 ENCOUNTER — OFFICE VISIT (OUTPATIENT)
Dept: ENDOCRINOLOGY | Facility: CLINIC | Age: 46
End: 2021-07-13

## 2021-07-13 VITALS
HEIGHT: 63 IN | WEIGHT: 153.2 LBS | BODY MASS INDEX: 27.14 KG/M2 | SYSTOLIC BLOOD PRESSURE: 120 MMHG | HEART RATE: 88 BPM | DIASTOLIC BLOOD PRESSURE: 74 MMHG | OXYGEN SATURATION: 97 %

## 2021-07-13 DIAGNOSIS — E27.40 ADRENAL INSUFFICIENCY (HCC): Primary | ICD-10-CM

## 2021-07-13 PROCEDURE — 99213 OFFICE O/P EST LOW 20 MIN: CPT | Performed by: INTERNAL MEDICINE

## 2021-07-13 RX ORDER — HYDROCORTISONE 5 MG/1
TABLET ORAL
Qty: 100 TABLET | Refills: 5 | Status: SHIPPED | OUTPATIENT
Start: 2021-07-13 | End: 2021-11-15 | Stop reason: SDUPTHER

## 2021-07-13 NOTE — PROGRESS NOTES
"Chief Complaint   Patient presents with   • Follow-up   • Adrenal Insufficiency        HPI   Umberto Oliva is a 46 y.o. female had concerns including Follow-up and Adrenal Insufficiency.     Patient reports that she continues on steroids, Medrol 4 mg daily. She reports that she is not on this dose since approximately April 2021. She does report that her provider at the [facility] requested she follow-up with our office prior to any further reduction in steroids. We did attempt ACTH stimulation prior to visit which patient did not pass. She did have some response However with rise in serum cortisol from 3.21-16.31. She does report that she believes she is in the plan to wean off steroids but will clarify this with her provider at the [facility]. She does desire to discontinue steroids, if and when appropriate. She does report recent wrist discomfort for which she is scheduled to see orthopedic surgery, no other health changes.    The following portions of the patient's history were reviewed and updated as appropriate: allergies, current medications and past social history.    Review of Systems   Constitutional: Negative for unexpected weight gain and unexpected weight loss.   Cardiovascular: Negative for palpitations.   Gastrointestinal: Negative for nausea and vomiting.      /74   Pulse 88   Ht 160 cm (62.99\")   Wt 69.5 kg (153 lb 3.2 oz)   SpO2 97%   BMI 27.15 kg/m²      Physical Exam      Constitutional:  well developed; well nourished  no acute distress  appears stated age   ENT/Thyroid: not examined   Eyes: Conjunctiva: clear   Respiratory:  breathing is unlabored  clear to auscultation bilaterally   Cardiovascular:  regular rate and rhythm   Chest:  Not performed.   Abdomen: soft, non-tender; no masses   : Not performed.   Musculoskeletal: Not performed   Skin: dry and warm   Neuro: mental status, speech normal   Psych: mood and affect are within normal limits "       Labs/Imaging  Results for EVERETTE SWARTZ (MRN [id number]) as of 7/13/2021 12:45   Ref. Range 7/9/2021 09:09 7/9/2021 10:05 7/9/2021 10:34   Cortisol Latest Ref Range:   mcg/dL 3.21 13.94 16.31       Diagnoses and all orders for this visit:    1. Adrenal insufficiency (CMS/HCC) (Primary)  -Secondary to chronic steroid exposure  -Patient reports her provider at the [facility] was hesitant to wean steroids beyond Medrol 4 mg daily until patient follows up in this office.  -Patient failed ACTH stimulation test earlier this week and has resumed medrol. Her current dose approximately 20 mg of hydrocortisone, slightly supraphysiologic.  -Discussed with patient that when she has been clear from her other providers to discontinue steroids I would recommend transitioning to hydrocortisone 10 mg in the morning and 5 mg in the afternoon. She will maintain on this dose for at least 1 month and then will contact the office for further instructions.  -Reviewed with patient that she may not discontinue steroids until she is passing ACTH stimulation.  -Reviewed sick day rules and stress dosing  -Discussed with patient the signs and symptoms of adrenal crisis and when to seek care.    Other orders  -     hydrocortisone (CORTEF) 5 MG tablet; Take 10 mg in the morning and 5 mg in the afternoon  Dispense: 100 tablet; Refill: 5      Return in about 4 months (around 11/13/2021). The patient was instructed to contact the clinic with any interval questions or concerns.    Venita Durán MD   Endocrinologist    Please note that portions of this document were completed with a voice recognition program. Efforts were made to edit the dictations, but occasionally words are mis-transcribed.

## 2021-08-10 ENCOUNTER — OFFICE VISIT (OUTPATIENT)
Dept: ORTHOPEDIC SURGERY | Facility: CLINIC | Age: 46
End: 2021-08-10

## 2021-08-10 VITALS — HEIGHT: 63 IN | BODY MASS INDEX: 26.79 KG/M2 | WEIGHT: 151.2 LBS | TEMPERATURE: 97.3 F

## 2021-08-10 DIAGNOSIS — M18.11 PRIMARY OSTEOARTHRITIS OF FIRST CARPOMETACARPAL JOINT OF RIGHT HAND: ICD-10-CM

## 2021-08-10 DIAGNOSIS — M25.531 ARTHRALGIA OF RIGHT WRIST: Primary | ICD-10-CM

## 2021-08-10 PROCEDURE — 99204 OFFICE O/P NEW MOD 45 MIN: CPT | Performed by: ORTHOPAEDIC SURGERY

## 2021-08-10 NOTE — PROGRESS NOTES
Subjective   Patient ID: Umberto Oliva is a 46 y.o. female  Pain of the Right Wrist (Ref by Sharyn Ulloa PA-C for right wrist pain since May, pain is worsening. No known injury. 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  Denies significant elbow pain she does have a little bit of a rash in the posterior aspect of her olecranon area with no recent pain or limited motion in the elbow.  Denies paresthesias of the fingers.  Does have pain with  and some pain along the thumb area.  Also soreness on the ulnar side of her small finger.  X-rays confirm subchondral cystic changes in several carpal bones and degenerative changes noted but no acute fracture.  She was seen in consultation while at the [facility] by the rheumatology section no definitive diagnosis was rendered.      Review of Systems   Constitutional: Negative for fever.   HENT: Negative for dental problem and voice change.    Eyes: Negative for visual disturbance.   Respiratory: Negative for shortness of breath.    Cardiovascular: Negative for chest pain.   Gastrointestinal: Negative for abdominal pain.   Genitourinary: Negative for dysuria.   Musculoskeletal: Positive for arthralgias and joint swelling. Negative for gait problem.   Skin: Negative for rash.   Neurological: Negative for speech difficulty.   Hematological: Does not bruise/bleed easily.   Psychiatric/Behavioral: Negative for confusion.       Past Medical History:   Diagnosis Date   • Arthritis    • Headache    • History of endometriosis     s/p MAXI/BSO   • History of ovarian cyst     multiple laparoscopic and open procedures prior to MAXI/BSO   • Migraines    • Nephrolithiasis     multiple, all have passed without  surgical intervention   • Vasculitis (CMS/HCC)         Past Surgical History:   Procedure Laterality Date   • APPENDECTOMY      appendix removed at some [facility] during a gynecologic procedure   •  SECTION  2002    for twins   • COLON SURGERY     • DIAGNOSTIC LAPAROSCOPY      multiple in teens and early adulthood for ovarian cysts and endometriosis   • EXPLORATORY LAPAROTOMY      missed enterotomy during laparoscopic procedure   • EXPLORATORY LAPAROTOMY W/ BOWEL RESECTION      ex-lap via paramedian incision with small bowel resection   • FULGURATION ENDOMETRIOSIS      multiple procedures for endometriosis as teenage   • LAPAROSCOPIC OVARIAN CYSTECTOMY     • TOTAL ABDOMINAL HYSTERECTOMY WITH SALPINGO OOPHORECTOMY  2012    via paramedian incision; had bladder injury treated with eagle catheter decompression       Family History   Problem Relation Age of Onset   • Osteoarthritis Mother    • Kidney disease Mother    • Migraines Mother    • Thyroid disease Mother    • Vision loss Mother    • Hypertension Father    • Diabetes Paternal Grandmother    • Stroke Paternal Grandmother         Diabetic passed away with a stroke   • Diabetes Maternal Grandfather    • Kidney disease Maternal Grandfather    • Stroke Maternal Grandfather    • Stroke Maternal Grandmother        Social History     Socioeconomic History   • Marital status:      Spouse name: Not on file   • Number of children: Not on file   • Years of education: Not on file   • Highest education level: Not on file   Tobacco Use   • Smoking status: Never Smoker   • Smokeless tobacco: Never Used   • Tobacco comment: Never   Vaping Use   • Vaping Use: Never used   Substance and Sexual Activity   • Alcohol use: No   • Drug use: No   • Sexual activity: Yes     Partners: Male     Birth control/protection: Surgical     Comment: Complete hysterectomy       I have reviewed the medical and surgical history, family history, social history,  "medications, and/or allergies, and the review of systems of this report.    No Known Allergies      Current Outpatient Medications:   •  albuterol sulfate  (90 Base) MCG/ACT inhaler, Inhale 2 puffs Every 4 (Four) Hours As Needed for Wheezing., Disp: 8 g, Rfl: 0  •  azaTHIOprine (IMURAN) 50 MG tablet, Take 150 mg by mouth Daily., Disp: , Rfl:   •  cyanocobalamin 1000 MCG/ML injection, Inject 1 mL into the appropriate muscle as directed by prescriber 1 (One) Time Per Week for 30 days, THEN 1 mL Every 28 (Twenty-Eight) Days for 335 days., Disp: 10 mL, Rfl: 1  •  fluticasone (FLONASE) 50 MCG/ACT nasal spray, 2 sprays into the nostril(s) as directed by provider Daily., Disp: , Rfl:   •  hydrocortisone (CORTEF) 5 MG tablet, Take 10 mg in the morning and 5 mg in the afternoon, Disp: 100 tablet, Rfl: 5  •  ipratropium-albuterol (DUO-NEB) 0.5-2.5 mg/3 ml nebulizer, Take 3 mL by nebulization Every 4 (Four) Hours As Needed for Wheezing., Disp: 120 mL, Rfl: 0  •  methylPREDNISolone (MEDROL) 4 MG tablet, Take 4 mg by mouth Daily., Disp: , Rfl:   •  omeprazole (priLOSEC) 20 MG capsule, Take 20 mg by mouth Daily., Disp: , Rfl:   •  promethazine (PHENERGAN) 12.5 MG tablet, Take 1 tablet by mouth Every 6 (Six) Hours As Needed for Nausea or Vomiting. May take 2 pills per dose if needed., Disp: 30 tablet, Rfl: 0  •  SUMAtriptan (IMITREX) 100 MG tablet, Take 1 tablet by mouth 1 (One) Time As Needed for Migraine (may repeat once in 2 hours if needed)., Disp: 9 tablet, Rfl: 5  •  Syringe/Needle, Disp, 25G X 5/8\" 1 ML misc, 1 each As Needed (use with B12 injections)., Disp: 50 each, Rfl: 1  •  Vitamin D, Cholecalciferol, (CHOLECALCIFEROL) 10 MCG (400 UNIT) tablet, Take 400 Units by mouth Daily., Disp: , Rfl:     Objective   Temp 97.3 °F (36.3 °C)   Ht 160 cm (62.99\")   Wt 68.6 kg (151 lb 3.2 oz)   BMI 26.79 kg/m²    Physical Exam  Constitutional: Patient is oriented to person, place, and time. Patient appears well-developed and " well-nourished.   HENT:Head: Normocephalic and atraumatic.   Eyes: EOM are normal. Pupils are equal, round, and reactive to light.   Neck: Normal range of motion. Neck supple.   Cardiovascular: Normal rate.    Pulmonary/Chest: Effort normal and breath sounds normal.   Abdominal: Soft.   Neurological: Patient is alert and oriented to person, place, and time.   Skin: Skin is warm and dry.   Psychiatric: Patient has a normal mood and affect.   Nursing note and vitals reviewed.       [unfilled]   Right hand: There is some tenderness at the trapeziometacarpal region with a positive basal joint grind maneuver, no tenderness of the de Quervain's compartment with a negative Finkelstein maneuver, there is pain with dorsiflexion at the proximal carpal area more on the midcarpal to ulnar side of the proximal carpal row.  No significant TFC tenderness or pain over the DRUJ, full supination pronation.  There is slight stiffness and tenderness with ulnar deviation radial deviation on the dorsum of the midcarpal to radiocarpal area.  No significant focal tenderness over the distal radius anatomic snuffbox proximal or distal forearm.    Assessment/Plan   Review of Radiographic Studies:    Radiographic images today of affected area I personally viewed and showed no sign of acute fracture or dislocation.     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 Arthralgia of right wrist (Primary)  -     Ambulatory Referral to Occupational Therapy  -     MRI Hand Right Without Contrast  -     Cancel: XR Finger 2+ View Right; Future       Orthopedic activities reviewed and patient expressed appreciation and Physical therapy referral given      Recommendations/Plan:   Work/Activity Status: May perform usual activities as tolerated    Patient agreeable to call or return sooner for any concerns.             Impression:  History of  inflammatory polyarthritis nodosa followed by the [facility], probable underlying inflammatory arthropathy right hand, rule out avascular necrosis and/or stress fracture right hand  Plan:  Occupational Therapy referral for local modalities strengthening range of motion to the right hand and wrist, continue normal work duties

## 2021-09-08 ENCOUNTER — HOSPITAL ENCOUNTER (OUTPATIENT)
Dept: MRI IMAGING | Facility: HOSPITAL | Age: 46
Discharge: HOME | End: 2021-09-08
Admitting: ORTHOPAEDIC SURGERY

## 2021-09-08 PROCEDURE — 73218 MRI UPPER EXTREMITY W/O DYE: CPT

## 2021-09-15 ENCOUNTER — LAB (OUTPATIENT)
Dept: LAB | Facility: HOSPITAL | Age: 46
End: 2021-09-15

## 2021-09-15 ENCOUNTER — TRANSCRIBE ORDERS (OUTPATIENT)
Dept: LAB | Facility: HOSPITAL | Age: 46
End: 2021-09-15

## 2021-09-15 DIAGNOSIS — I77.6 VASCULITIS (HCC): Primary | ICD-10-CM

## 2021-09-15 DIAGNOSIS — I77.6 VASCULITIS (HCC): ICD-10-CM

## 2021-09-15 LAB
BASOPHILS # BLD AUTO: 0.05 10*3/MM3 (ref 0–0.2)
BASOPHILS NFR BLD AUTO: 1 % (ref 0–1.5)
DEPRECATED RDW RBC AUTO: 42.1 FL (ref 37–54)
EOSINOPHIL # BLD AUTO: 0.01 10*3/MM3 (ref 0–0.4)
EOSINOPHIL NFR BLD AUTO: 0.2 % (ref 0.3–6.2)
ERYTHROCYTE [DISTWIDTH] IN BLOOD BY AUTOMATED COUNT: 13.1 % (ref 12.3–15.4)
ERYTHROCYTE [SEDIMENTATION RATE] IN BLOOD: 19 MM/HR (ref 0–20)
HCT VFR BLD AUTO: 31.8 % (ref 34–46.6)
HGB BLD-MCNC: 11 G/DL (ref 12–15.9)
IMM GRANULOCYTES # BLD AUTO: 0.01 10*3/MM3 (ref 0–0.05)
IMM GRANULOCYTES NFR BLD AUTO: 0.2 % (ref 0–0.5)
LYMPHOCYTES # BLD AUTO: 1.56 10*3/MM3 (ref 0.7–3.1)
LYMPHOCYTES NFR BLD AUTO: 32.4 % (ref 19.6–45.3)
MCH RBC QN AUTO: 30.9 PG (ref 26.6–33)
MCHC RBC AUTO-ENTMCNC: 34.6 G/DL (ref 31.5–35.7)
MCV RBC AUTO: 89.3 FL (ref 79–97)
MONOCYTES # BLD AUTO: 0.51 10*3/MM3 (ref 0.1–0.9)
MONOCYTES NFR BLD AUTO: 10.6 % (ref 5–12)
NEUTROPHILS NFR BLD AUTO: 2.67 10*3/MM3 (ref 1.7–7)
NEUTROPHILS NFR BLD AUTO: 55.6 % (ref 42.7–76)
NRBC BLD AUTO-RTO: 0 /100 WBC (ref 0–0.2)
PLATELET # BLD AUTO: 366 10*3/MM3 (ref 140–450)
PMV BLD AUTO: 9.2 FL (ref 6–12)
RBC # BLD AUTO: 3.56 10*6/MM3 (ref 3.77–5.28)
WBC # BLD AUTO: 4.81 10*3/MM3 (ref 3.4–10.8)

## 2021-09-15 PROCEDURE — 80053 COMPREHEN METABOLIC PANEL: CPT

## 2021-09-15 PROCEDURE — 85025 COMPLETE CBC W/AUTO DIFF WBC: CPT

## 2021-09-15 PROCEDURE — 85652 RBC SED RATE AUTOMATED: CPT

## 2021-09-15 PROCEDURE — 36415 COLL VENOUS BLD VENIPUNCTURE: CPT

## 2021-09-16 LAB
ALBUMIN SERPL-MCNC: 4.3 G/DL (ref 3.5–5.2)
ALBUMIN/GLOB SERPL: 1.5 G/DL
ALP SERPL-CCNC: 80 U/L (ref 39–117)
ALT SERPL W P-5'-P-CCNC: 12 U/L (ref 1–33)
ANION GAP SERPL CALCULATED.3IONS-SCNC: 10.7 MMOL/L (ref 5–15)
AST SERPL-CCNC: 19 U/L (ref 1–32)
BILIRUB SERPL-MCNC: 0.2 MG/DL (ref 0–1.2)
BUN SERPL-MCNC: 10 MG/DL (ref 6–20)
BUN/CREAT SERPL: 13.3 (ref 7–25)
CALCIUM SPEC-SCNC: 9.2 MG/DL (ref 8.6–10.5)
CHLORIDE SERPL-SCNC: 104 MMOL/L (ref 98–107)
CO2 SERPL-SCNC: 26.3 MMOL/L (ref 22–29)
CREAT SERPL-MCNC: 0.75 MG/DL (ref 0.57–1)
GFR SERPL CREATININE-BSD FRML MDRD: 84 ML/MIN/1.73
GLOBULIN UR ELPH-MCNC: 2.8 GM/DL
GLUCOSE SERPL-MCNC: 85 MG/DL (ref 65–99)
POTASSIUM SERPL-SCNC: 4.2 MMOL/L (ref 3.5–5.2)
PROT SERPL-MCNC: 7.1 G/DL (ref 6–8.5)
SODIUM SERPL-SCNC: 141 MMOL/L (ref 136–145)

## 2021-09-30 DIAGNOSIS — M06.4 INFLAMMATORY POLYARTHROPATHY (HCC): Primary | ICD-10-CM

## 2021-09-30 RX ORDER — KETOROLAC TROMETHAMINE 30 MG/ML
30 INJECTION, SOLUTION INTRAMUSCULAR; INTRAVENOUS ONCE
Status: COMPLETED | OUTPATIENT
Start: 2021-09-30 | End: 2021-10-01

## 2021-10-01 ENCOUNTER — CLINICAL SUPPORT (OUTPATIENT)
Dept: INTERNAL MEDICINE | Facility: CLINIC | Age: 46
End: 2021-10-01

## 2021-10-01 DIAGNOSIS — G89.29 CHRONIC PAIN OF RIGHT ANKLE: ICD-10-CM

## 2021-10-01 DIAGNOSIS — M25.571 CHRONIC PAIN OF RIGHT ANKLE: ICD-10-CM

## 2021-10-01 PROCEDURE — 96372 THER/PROPH/DIAG INJ SC/IM: CPT | Performed by: PHYSICIAN ASSISTANT

## 2021-10-01 RX ADMIN — KETOROLAC TROMETHAMINE 30 MG: 30 INJECTION, SOLUTION INTRAMUSCULAR; INTRAVENOUS at 16:45

## 2021-10-08 ENCOUNTER — HOSPITAL ENCOUNTER (OUTPATIENT)
Dept: ULTRASOUND IMAGING | Facility: HOSPITAL | Age: 46
Discharge: HOME | End: 2021-10-08

## 2021-10-08 ENCOUNTER — OFFICE VISIT (OUTPATIENT)
Dept: INTERNAL MEDICINE | Facility: CLINIC | Age: 46
End: 2021-10-08

## 2021-10-08 ENCOUNTER — LAB (OUTPATIENT)
Dept: LAB | Facility: HOSPITAL | Age: 46
End: 2021-10-08

## 2021-10-08 VITALS
TEMPERATURE: 97.1 F | WEIGHT: 151 LBS | DIASTOLIC BLOOD PRESSURE: 78 MMHG | HEIGHT: 63 IN | OXYGEN SATURATION: 99 % | BODY MASS INDEX: 26.75 KG/M2 | SYSTOLIC BLOOD PRESSURE: 118 MMHG | HEART RATE: 77 BPM

## 2021-10-08 DIAGNOSIS — M06.4 INFLAMMATORY POLYARTHROPATHY (HCC): ICD-10-CM

## 2021-10-08 DIAGNOSIS — R23.8 REDNESS AND SWELLING OF UPPER ARM: ICD-10-CM

## 2021-10-08 DIAGNOSIS — R60.0 EDEMA OF RIGHT UPPER ARM: Primary | ICD-10-CM

## 2021-10-08 DIAGNOSIS — M79.89 REDNESS AND SWELLING OF UPPER ARM: ICD-10-CM

## 2021-10-08 LAB
BASOPHILS # BLD AUTO: 0.05 10*3/MM3 (ref 0–0.2)
BASOPHILS NFR BLD AUTO: 1.1 % (ref 0–1.5)
DEPRECATED RDW RBC AUTO: 45.1 FL (ref 37–54)
EOSINOPHIL # BLD AUTO: 0.02 10*3/MM3 (ref 0–0.4)
EOSINOPHIL NFR BLD AUTO: 0.4 % (ref 0.3–6.2)
ERYTHROCYTE [DISTWIDTH] IN BLOOD BY AUTOMATED COUNT: 13.5 % (ref 12.3–15.4)
HCT VFR BLD AUTO: 33.8 % (ref 34–46.6)
HGB BLD-MCNC: 11.3 G/DL (ref 12–15.9)
IMM GRANULOCYTES # BLD AUTO: 0.01 10*3/MM3 (ref 0–0.05)
IMM GRANULOCYTES NFR BLD AUTO: 0.2 % (ref 0–0.5)
LYMPHOCYTES # BLD AUTO: 1.77 10*3/MM3 (ref 0.7–3.1)
LYMPHOCYTES NFR BLD AUTO: 38.1 % (ref 19.6–45.3)
MCH RBC QN AUTO: 30.3 PG (ref 26.6–33)
MCHC RBC AUTO-ENTMCNC: 33.4 G/DL (ref 31.5–35.7)
MCV RBC AUTO: 90.6 FL (ref 79–97)
MONOCYTES # BLD AUTO: 0.49 10*3/MM3 (ref 0.1–0.9)
MONOCYTES NFR BLD AUTO: 10.5 % (ref 5–12)
NEUTROPHILS NFR BLD AUTO: 2.31 10*3/MM3 (ref 1.7–7)
NEUTROPHILS NFR BLD AUTO: 49.7 % (ref 42.7–76)
NRBC BLD AUTO-RTO: 0 /100 WBC (ref 0–0.2)
PLATELET # BLD AUTO: 385 10*3/MM3 (ref 140–450)
PMV BLD AUTO: 9.4 FL (ref 6–12)
RBC # BLD AUTO: 3.73 10*6/MM3 (ref 3.77–5.28)
WBC # BLD AUTO: 4.65 10*3/MM3 (ref 3.4–10.8)

## 2021-10-08 PROCEDURE — 99214 OFFICE O/P EST MOD 30 MIN: CPT | Performed by: NURSE PRACTITIONER

## 2021-10-08 PROCEDURE — 96372 THER/PROPH/DIAG INJ SC/IM: CPT | Performed by: NURSE PRACTITIONER

## 2021-10-08 PROCEDURE — 87086 URINE CULTURE/COLONY COUNT: CPT | Performed by: PHYSICIAN ASSISTANT

## 2021-10-08 PROCEDURE — 85025 COMPLETE CBC W/AUTO DIFF WBC: CPT

## 2021-10-08 PROCEDURE — 36415 COLL VENOUS BLD VENIPUNCTURE: CPT | Performed by: NURSE PRACTITIONER

## 2021-10-08 PROCEDURE — 93971 EXTREMITY STUDY: CPT

## 2021-10-08 RX ORDER — KETOROLAC TROMETHAMINE 30 MG/ML
30 INJECTION, SOLUTION INTRAMUSCULAR; INTRAVENOUS EVERY 6 HOURS PRN
Status: DISCONTINUED | OUTPATIENT
Start: 2021-10-08 | End: 2021-10-08

## 2021-10-08 RX ORDER — KETOROLAC TROMETHAMINE 10 MG/1
10 TABLET, FILM COATED ORAL EVERY 6 HOURS PRN
Qty: 20 TABLET | Refills: 0 | Status: SHIPPED | OUTPATIENT
Start: 2021-10-08 | End: 2021-10-13

## 2021-10-08 RX ORDER — KETOROLAC TROMETHAMINE 30 MG/ML
30 INJECTION, SOLUTION INTRAMUSCULAR; INTRAVENOUS ONCE
Status: COMPLETED | OUTPATIENT
Start: 2021-10-08 | End: 2021-10-08

## 2021-10-08 RX ADMIN — KETOROLAC TROMETHAMINE 30 MG: 30 INJECTION, SOLUTION INTRAMUSCULAR; INTRAVENOUS at 17:33

## 2021-10-08 NOTE — PROGRESS NOTES
Acute Office Visit      Patient Name: Umberto Oliva  : 1975   MRN: [id number]     Chief Complaint:    Chief Complaint   Patient presents with   • Edema     rt hand swelling and redness       History of Present Illness: Umberto Oliva is a 46 y.o. female who presents with right upper extremity swelling, pain, erythema that has been present for over a week.  She has polyarthropathy, sees rheumatology (Dr Capps and [facility]).  Has been prescribed Rituximab infusion 2 doses, 2 weeks apart, good for 6 months per [facility], unable to get this infusion as that provider is not licensed in [place].  Currently weaning hydrocortisone dose, takes 2 mg daily.   No SOA, cough, palpitations, recent COVID, fever, chills, injury to right hand or am.       Subjective      I have reviewed and the following portions of the patient's history were updated as appropriate: past family history, past medical history, past social history, past surgical history and problem list.      Current Outpatient Medications:   •  albuterol sulfate  (90 Base) MCG/ACT inhaler, Inhale 2 puffs Every 4 (Four) Hours As Needed for Wheezing., Disp: 8 g, Rfl: 0  •  fluticasone (FLONASE) 50 MCG/ACT nasal spray, 2 sprays into the nostril(s) as directed by provider Daily., Disp: , Rfl:   •  hydrocortisone (CORTEF) 5 MG tablet, Take 10 mg in the morning and 5 mg in the afternoon, Disp: 100 tablet, Rfl: 5  •  ipratropium-albuterol (DUO-NEB) 0.5-2.5 mg/3 ml nebulizer, Take 3 mL by nebulization Every 4 (Four) Hours As Needed for Wheezing., Disp: 120 mL, Rfl: 0  •  omeprazole (priLOSEC) 20 MG capsule, Take 20 mg by mouth Daily., Disp: , Rfl:   •  promethazine (PHENERGAN) 12.5 MG tablet, Take 1 tablet by mouth Every 6 (Six) Hours As Needed for Nausea or Vomiting. May take 2 pills per dose if needed., Disp: 30 tablet, Rfl: 0  •  SUMAtriptan (IMITREX) 100 MG tablet, Take 1 tablet by mouth 1 (One) Time As Needed for Migraine (may  "repeat once in 2 hours if needed)., Disp: 9 tablet, Rfl: 5  •  Syringe/Needle, Disp, 25G X 5/8\" 1 ML misc, 1 each As Needed (use with B12 injections)., Disp: 50 each, Rfl: 1  •  Vitamin D, Cholecalciferol, (CHOLECALCIFEROL) 10 MCG (400 UNIT) tablet, Take 400 Units by mouth Daily., Disp: , Rfl:   •  azaTHIOprine (IMURAN) 50 MG tablet, Take 150 mg by mouth Daily., Disp: , Rfl:   •  ketorolac (TORADOL) 10 MG tablet, Take 1 tablet by mouth Every 6 (Six) Hours As Needed for Moderate Pain  for up to 5 days., Disp: 20 tablet, Rfl: 0  •  methylPREDNISolone (MEDROL) 4 MG tablet, Take 4 mg by mouth Daily., Disp: , Rfl:     No Known Allergies    Objective     Physical Exam:  Vital Signs:   Vitals:    10/08/21 1420   BP: 118/78   Pulse: 77   Temp: 97.1 °F (36.2 °C)   TempSrc: Infrared   SpO2: 99%   Weight: 68.5 kg (151 lb)   Height: 160 cm (62.99\")     Body mass index is 26.76 kg/m².    Physical Exam  Constitutional:       Appearance: She is not ill-appearing.   HENT:      Head: Normocephalic.      Right Ear: External ear normal.      Left Ear: External ear normal.   Eyes:      Conjunctiva/sclera: Conjunctivae normal.      Pupils: Pupils are equal, round, and reactive to light.   Cardiovascular:      Rate and Rhythm: Normal rate and regular rhythm.      Pulses:           Radial pulses are 2+ on the right side and 2+ on the left side.      Heart sounds: Normal heart sounds.   Pulmonary:      Effort: Pulmonary effort is normal.      Breath sounds: Normal breath sounds.   Musculoskeletal:      Right forearm: Swelling and tenderness present.      Right hand: Swelling and tenderness present. Decreased range of motion. Normal strength. Normal sensation. Normal capillary refill. Normal pulse.      Cervical back: Normal range of motion and neck supple.   Skin:     General: Skin is warm.      Capillary Refill: Capillary refill takes less than 2 seconds.   Neurological:      Mental Status: She is alert and oriented to person, place, and " time.      Coordination: Coordination normal.      Gait: Gait normal.   Psychiatric:         Mood and Affect: Mood normal.         Behavior: Behavior normal.         Thought Content: Thought content normal.       Assessment / Plan      Assessment/Plan:   Diagnoses and all orders for this visit:    1. Edema of right upper arm (Primary)  -     US venous doppler upper extremity right (duplex)    2. Redness and swelling of upper arm  -     US venous doppler upper extremity right (duplex)    3. Inflammatory polyarthropathy (HCC)  -     Ambulatory Referral to Rheumatology  -     ketorolac (TORADOL) 10 MG tablet; Take 1 tablet by mouth Every 6 (Six) Hours As Needed for Moderate Pain  for up to 5 days.  Dispense: 20 tablet; Refill: 0  -     ketorolac (TORADOL) injection 30 mg.  Given after DVT ruled out per doppler US.        Follow Up:   Return if symptoms worsen or fail to improve.    Patient was given instructions and counseling regarding her condition or for health maintenance advice. Please see specific information pulled into the AVS if appropriate.       Primary Care [address] [facility]     Please note that portions of this note may have been completed with a voice recognition program. Efforts were made to edit dictation, but occasionally words are mistranscribed.

## 2021-10-09 LAB — BACTERIA SPEC AEROBE CULT: NORMAL

## 2021-10-11 DIAGNOSIS — R10.9 RIGHT FLANK PAIN: Primary | ICD-10-CM

## 2021-10-18 ENCOUNTER — TELEPHONE (OUTPATIENT)
Dept: INTERNAL MEDICINE | Facility: CLINIC | Age: 46
End: 2021-10-18

## 2021-10-29 ENCOUNTER — OFFICE VISIT (OUTPATIENT)
Dept: INTERNAL MEDICINE | Facility: CLINIC | Age: 46
End: 2021-10-29

## 2021-10-29 VITALS
HEART RATE: 118 BPM | SYSTOLIC BLOOD PRESSURE: 126 MMHG | HEIGHT: 63 IN | BODY MASS INDEX: 26.4 KG/M2 | DIASTOLIC BLOOD PRESSURE: 76 MMHG | TEMPERATURE: 97.5 F | WEIGHT: 149 LBS | OXYGEN SATURATION: 98 %

## 2021-10-29 DIAGNOSIS — E53.8 B12 DEFICIENCY: ICD-10-CM

## 2021-10-29 DIAGNOSIS — E55.9 VITAMIN D DEFICIENCY: ICD-10-CM

## 2021-10-29 DIAGNOSIS — G89.29 CHRONIC PAIN OF RIGHT ANKLE: Primary | ICD-10-CM

## 2021-10-29 DIAGNOSIS — M25.571 CHRONIC PAIN OF RIGHT ANKLE: Primary | ICD-10-CM

## 2021-10-29 DIAGNOSIS — I77.6 VASCULITIS (HCC): ICD-10-CM

## 2021-10-29 DIAGNOSIS — M06.4 INFLAMMATORY POLYARTHROPATHY (HCC): ICD-10-CM

## 2021-10-29 PROCEDURE — 99214 OFFICE O/P EST MOD 30 MIN: CPT | Performed by: PHYSICIAN ASSISTANT

## 2021-10-29 RX ORDER — PREGABALIN 50 MG/1
50 CAPSULE ORAL
COMMUNITY
Start: 2021-10-28 | End: 2021-11-15

## 2021-10-29 RX ORDER — TRAMADOL HYDROCHLORIDE 50 MG/1
50 TABLET ORAL EVERY 8 HOURS PRN
Qty: 10 TABLET | Refills: 0 | Status: SHIPPED | OUTPATIENT
Start: 2021-10-29 | End: 2021-12-23 | Stop reason: SDUPTHER

## 2021-10-29 NOTE — PROGRESS NOTES
Follow Up Office Visit      Patient Name: Umberto Oliva  : 1975   MRN: [id number]     Chief Complaint:    Chief Complaint   Patient presents with   • Follow-up     R leg and foot pain       History of Present Illness: Umberto Oliva is a 46 y.o. female who is here today for follow up of pain in her right foot and ankle. She had a virtual visit with her rheumatologist at [facility] on 10/8/21 who recommend trial of rituximab infusions which she will not be able to get scheduled [place] so we have ordered a rheumatology referral to . Pain has been so severe at times that she cannot walk despite taking Imuran, hydrocortisone and Ibuprofen 800 mg. [facility] prescribed a trial of Lyrica but if not helpful may try Tramadol instead. The right ankle has continued to be swollen which may contribute to pain. She is having vascular surgery on the right leg in early December due to severe venous insufficiency.       Subjective      I have reviewed and the following portions of the patient's history were updated as appropriate: past family history, past medical history, past social history, past surgical history and problem list.      Current Outpatient Medications:   •  albuterol sulfate  (90 Base) MCG/ACT inhaler, Inhale 2 puffs Every 4 (Four) Hours As Needed for Wheezing., Disp: 8 g, Rfl: 0  •  fluticasone (FLONASE) 50 MCG/ACT nasal spray, 2 sprays into the nostril(s) as directed by provider Daily., Disp: , Rfl:   •  hydrocortisone (CORTEF) 5 MG tablet, Take 10 mg in the morning and 5 mg in the afternoon, Disp: 100 tablet, Rfl: 5  •  ipratropium-albuterol (DUO-NEB) 0.5-2.5 mg/3 ml nebulizer, Take 3 mL by nebulization Every 4 (Four) Hours As Needed for Wheezing., Disp: 120 mL, Rfl: 0  •  omeprazole (priLOSEC) 20 MG capsule, Take 20 mg by mouth Daily., Disp: , Rfl:   •  pregabalin (LYRICA) 50 MG capsule, Take 50 mg by mouth., Disp: , Rfl:   •  promethazine (PHENERGAN) 12.5  "MG tablet, Take 1 tablet by mouth Every 6 (Six) Hours As Needed for Nausea or Vomiting. May take 2 pills per dose if needed., Disp: 30 tablet, Rfl: 0  •  SUMAtriptan (IMITREX) 100 MG tablet, Take 1 tablet by mouth 1 (One) Time As Needed for Migraine (may repeat once in 2 hours if needed)., Disp: 9 tablet, Rfl: 5  •  Syringe/Needle, Disp, 25G X 5/8\" 1 ML misc, 1 each As Needed (use with B12 injections)., Disp: 50 each, Rfl: 1  •  Vitamin D, Cholecalciferol, (CHOLECALCIFEROL) 10 MCG (400 UNIT) tablet, Take 400 Units by mouth Daily., Disp: , Rfl:   •  azaTHIOprine (IMURAN) 50 MG tablet, Take 150 mg by mouth Daily., Disp: , Rfl:   •  traMADol (ULTRAM) 50 MG tablet, Take 1 tablet by mouth Every 8 (Eight) Hours As Needed for Severe Pain ., Disp: 10 tablet, Rfl: 0    No Known Allergies    Objective     Physical Exam:  Vital Signs:   Vitals:    10/29/21 1617   BP: 126/76   Pulse: 118   Temp: 97.5 °F (36.4 °C)   SpO2: 98%   Weight: 67.6 kg (149 lb)   Height: 160 cm (62.99\")     Body mass index is 26.4 kg/m².    Physical Exam  Vitals and nursing note reviewed.   Constitutional:       General: She is not in acute distress.     Appearance: She is well-developed. She is not ill-appearing, toxic-appearing or diaphoretic.   HENT:      Head: Normocephalic and atraumatic.      Right Ear: External ear normal.      Left Ear: External ear normal.   Eyes:      General: No scleral icterus.     Extraocular Movements: Extraocular movements intact.      Conjunctiva/sclera: Conjunctivae normal.      Pupils: Pupils are equal, round, and reactive to light.   Cardiovascular:      Rate and Rhythm: Normal rate and regular rhythm.      Heart sounds: Normal heart sounds. No murmur heard.  No friction rub. No gallop.    Pulmonary:      Effort: Pulmonary effort is normal. No respiratory distress.      Breath sounds: Normal breath sounds. No wheezing, rhonchi or rales.   Chest:      Chest wall: No tenderness.   Musculoskeletal:         General: No " deformity.      Cervical back: Normal range of motion and neck supple.      Right lower leg: Swelling present. 1+ Pitting Edema present.      Left lower leg: Pitting Edema (trace) present.      Right ankle: Swelling present. No lacerations. Tenderness (diffusely) present. Decreased range of motion.   Skin:     General: Skin is warm and dry.      Capillary Refill: Capillary refill takes less than 2 seconds.      Coloration: Skin is not pale.      Findings: Erythema present. No abscess or rash.          Neurological:      Mental Status: She is alert and oriented to person, place, and time.      Cranial Nerves: No cranial nerve deficit.      Sensory: No sensory deficit.      Motor: No abnormal muscle tone.      Coordination: Coordination normal.      Gait: Gait abnormal (favoring right leg).      Deep Tendon Reflexes: Reflexes normal.   Psychiatric:         Attention and Perception: Attention and perception normal.         Mood and Affect: Mood normal. Affect is tearful ( When discussing pain). Affect is not blunt, angry or inappropriate.         Speech: Speech normal.         Behavior: Behavior normal. Behavior is cooperative.         Thought Content: Thought content normal.         Cognition and Memory: Cognition and memory normal.         Judgment: Judgment normal.             Common labs    Common Labsle 7/8/21 9/15/21 9/15/21 10/8/21     1439 1439    Glucose 119 (A)  85    BUN 9  10    Creatinine 0.70  0.75    eGFR Non African Am 91  84    Sodium 140  141    Potassium 4.1  4.2    Chloride 102  104    Calcium 9.3  9.2    Albumin 3.90  4.30    Total Bilirubin 0.5  0.2    Alkaline Phosphatase 79  80    AST (SGOT) 29  19    ALT (SGPT) 14  12    WBC  4.81  4.65   Hemoglobin  11.0 (A)  11.3 (A)   Hematocrit  31.8 (A)  33.8 (A)   Platelets  366  385   (A) Abnormal value       Comments are available for some flowsheets but are not being displayed.               Assessment / Plan      Assessment/Plan:   Diagnoses and all  orders for this visit:    1. Chronic pain of right ankle (Primary)  -     CBC & Differential  -     Comprehensive Metabolic Panel  -     Sedimentation rate, automated  -     traMADol (ULTRAM) 50 MG tablet; Take 1 tablet by mouth Every 8 (Eight) Hours As Needed for Severe Pain .  Dispense: 10 tablet; Refill: 0    2. Inflammatory polyarthropathy (HCC)  3. Vasculitis (HCC)  -     CBC & Differential  -     Comprehensive Metabolic Panel  -     Sedimentation rate, automated        -     May try Voltaren gel applied to the ankle 4 times daily as needed for additional pain control.  -     She is encouraged to begin Lyrica as prescribed by rheumatology at [facility] and then follow-up with them to review efficacy or consider medication change.    -     traMADol (ULTRAM) 50 MG tablet; Take 1 tablet by mouth Every 8 (Eight) Hours As Needed for Severe Pain .  Dispense: 10 tablet; Refill: 0  Cam reviewed and compliant.  Patient advised to use tramadol only on rare occasion for severe pain but otherwise sent her to the ER.  CONTROLLED SUBSTANCE TRACKING 12/1/2020 10/29/2021   Juan Levy 12/1/2020 10/29/2021   Report Number [id number]         I spent 30 minutes caring for Umberto on this date of service. This time includes time spent by me in the following activities:preparing for the visit, reviewing tests, performing a medically appropriate examination and/or evaluation , counseling and educating the patient/family/caregiver, ordering medications, tests, or procedures, documenting information in the medical record, independently interpreting results and communicating that information with the patient/family/caregiver and care coordination    Follow Up:   No follow-ups on file.    Patient was given instructions and counseling regarding her condition or for health maintenance advice. Please see specific information pulled into the AVS if appropriate.     Sharyn Ulloa PA-C  Primary Care [facility]      Please note that portions of this note may have been completed with a voice recognition program. Efforts were made to edit the dictations, but occasionally words are mistranscribed.

## 2021-10-30 LAB
25(OH)D3+25(OH)D2 SERPL-MCNC: 32.6 NG/ML (ref 30–100)
ALBUMIN SERPL-MCNC: 4.3 G/DL (ref 3.8–4.8)
ALBUMIN/GLOB SERPL: 1.4 {RATIO} (ref 1.2–2.2)
ALP SERPL-CCNC: 105 IU/L (ref 44–121)
ALT SERPL-CCNC: 15 IU/L (ref 0–32)
AST SERPL-CCNC: 22 IU/L (ref 0–40)
BASOPHILS # BLD AUTO: 0.1 X10E3/UL (ref 0–0.2)
BASOPHILS NFR BLD AUTO: 1 %
BILIRUB SERPL-MCNC: 0.2 MG/DL (ref 0–1.2)
BUN SERPL-MCNC: 10 MG/DL (ref 6–24)
BUN/CREAT SERPL: 13 (ref 9–23)
CALCIUM SERPL-MCNC: 9.2 MG/DL (ref 8.7–10.2)
CHLORIDE SERPL-SCNC: 102 MMOL/L (ref 96–106)
CO2 SERPL-SCNC: 22 MMOL/L (ref 20–29)
CREAT SERPL-MCNC: 0.78 MG/DL (ref 0.57–1)
EOSINOPHIL # BLD AUTO: 0 X10E3/UL (ref 0–0.4)
EOSINOPHIL NFR BLD AUTO: 0 %
ERYTHROCYTE [DISTWIDTH] IN BLOOD BY AUTOMATED COUNT: 13.2 % (ref 11.7–15.4)
ERYTHROCYTE [SEDIMENTATION RATE] IN BLOOD BY WESTERGREN METHOD: 42 MM/HR (ref 0–32)
GLOBULIN SER CALC-MCNC: 3 G/DL (ref 1.5–4.5)
GLUCOSE SERPL-MCNC: 85 MG/DL (ref 65–99)
HCT VFR BLD AUTO: 35.8 % (ref 34–46.6)
HGB BLD-MCNC: 11.7 G/DL (ref 11.1–15.9)
IMM GRANULOCYTES # BLD AUTO: 0 X10E3/UL (ref 0–0.1)
IMM GRANULOCYTES NFR BLD AUTO: 0 %
LYMPHOCYTES # BLD AUTO: 2.3 X10E3/UL (ref 0.7–3.1)
LYMPHOCYTES NFR BLD AUTO: 40 %
MCH RBC QN AUTO: 29.2 PG (ref 26.6–33)
MCHC RBC AUTO-ENTMCNC: 32.7 G/DL (ref 31.5–35.7)
MCV RBC AUTO: 89 FL (ref 79–97)
MONOCYTES # BLD AUTO: 0.5 X10E3/UL (ref 0.1–0.9)
MONOCYTES NFR BLD AUTO: 8 %
NEUTROPHILS # BLD AUTO: 2.9 X10E3/UL (ref 1.4–7)
NEUTROPHILS NFR BLD AUTO: 51 %
PLATELET # BLD AUTO: 311 X10E3/UL (ref 150–450)
POTASSIUM SERPL-SCNC: 4 MMOL/L (ref 3.5–5.2)
PROT SERPL-MCNC: 7.3 G/DL (ref 6–8.5)
RBC # BLD AUTO: 4.01 X10E6/UL (ref 3.77–5.28)
SODIUM SERPL-SCNC: 141 MMOL/L (ref 134–144)
VIT B12 SERPL-MCNC: 446 PG/ML (ref 232–1245)
WBC # BLD AUTO: 5.8 X10E3/UL (ref 3.4–10.8)

## 2021-11-11 ENCOUNTER — TELEPHONE (OUTPATIENT)
Dept: ENDOCRINOLOGY | Facility: CLINIC | Age: 46
End: 2021-11-11

## 2021-11-11 NOTE — TELEPHONE ENCOUNTER
PT CALLED TO SEE IF SHE WAS GOING TO HAVE LABS DRAW AT HER APPOINTMENT ON Monday- THE LAST TIME IT WAS TO LATE IN THE DAY TO DO THEM    PLEASE CALL PT  [phone]

## 2021-11-11 NOTE — TELEPHONE ENCOUNTER
Spoke with pt and told her I did not see in the last OV about labs, or any labs for the future and MM would not be back in the office until Monday. She verbalized understanding.

## 2021-11-15 ENCOUNTER — OFFICE VISIT (OUTPATIENT)
Dept: ENDOCRINOLOGY | Facility: CLINIC | Age: 46
End: 2021-11-15

## 2021-11-15 VITALS
BODY MASS INDEX: 27.42 KG/M2 | HEART RATE: 96 BPM | OXYGEN SATURATION: 98 % | WEIGHT: 149 LBS | HEIGHT: 62 IN | DIASTOLIC BLOOD PRESSURE: 70 MMHG | SYSTOLIC BLOOD PRESSURE: 116 MMHG

## 2021-11-15 DIAGNOSIS — E27.40 ADRENAL INSUFFICIENCY (HCC): Primary | ICD-10-CM

## 2021-11-15 PROCEDURE — 99213 OFFICE O/P EST LOW 20 MIN: CPT | Performed by: INTERNAL MEDICINE

## 2021-11-15 RX ORDER — HYDROCORTISONE 5 MG/1
TABLET ORAL
Qty: 100 TABLET | Refills: 5 | Status: SHIPPED | OUTPATIENT
Start: 2021-11-15 | End: 2022-05-16 | Stop reason: SDUPTHER

## 2021-11-15 NOTE — PROGRESS NOTES
"Chief Complaint   Patient presents with   • Adrenal Problem     adrenal insufficiency        HPI   Umberto Oliva is a 46 y.o. female had concerns including Adrenal Problem (adrenal insufficiency).      Patient reports that Imuran was discontinued by her rheumatologist at the [facility] in the interim since last visit. She estimates this is stopped approximately 6 weeks ago. This was not replaced with any other medication and she is currently awaiting a consultation with rheumatology at the [facility] but states this has not yet been scheduled. She does report ongoing joint pain, specifically in the right foot. She does report that she was evaluated by vascular surgery and due to ongoing bilateral leg swelling. She was noted to have valvular disease on the right and is scheduled to have a procedure for this in early December. Regarding her adrenal insufficiency, she reports she has not had to utilize stress dosing in the interim since last visit. She continues on hydrocortisone 10 mg in the morning, 5 mg in the early afternoon. She denies any concerning weight changes, lightheadedness.    The following portions of the patient's history were reviewed and updated as appropriate: allergies, current medications and past social history.    Review of Systems   Constitutional: Negative for unexpected weight gain and unexpected weight loss.   Gastrointestinal: Negative for constipation and diarrhea.   Musculoskeletal: Positive for arthralgias and gait problem.   Neurological: Negative for dizziness and light-headedness.      /70 (BP Location: Right arm, Patient Position: Sitting, Cuff Size: Adult)   Pulse 96   Ht 157.5 cm (62\")   Wt 67.6 kg (149 lb)   SpO2 98%   BMI 27.25 kg/m²      Physical Exam      Constitutional:  well developed; well nourished  no acute distress   ENT/Thyroid: not examined   Eyes: Conjunctiva: clear   Respiratory:  breathing is unlabored  clear to auscultation bilaterally "   Cardiovascular:  regular rate and rhythm   Chest:  Not performed.   Abdomen: soft, non-tender; no masses   : Not performed.   Musculoskeletal: Not performed   Skin: dry and warm   Neuro: mental status, speech normal   Psych: mood and affect are within normal limits       Labs/Imaging  Results for EVERETTE SWARTZ (MRN [id number]) as of 11/15/2021 15:30   Ref. Range 7/9/2021 09:09 7/9/2021 10:05 7/9/2021 10:34   Cortisol Latest Ref Range:   mcg/dL 3.21 13.94 16.31       Diagnoses and all orders for this visit:    1. Adrenal insufficiency (HCC) (Primary)  Secondary to chronic steroid exposure  Patient currently taking physiologic doses of hydrocortisone, 10 mg in the morning, 5 mg in the afternoon.  Patient failed ACTH stimulation in July 2021  discussed with patient that she must continue on hydrocortisone until she has passed ACTH stimulation test, discussed potential timing of repeat ACTH stimulation. Patient is currently awaiting rheumatology consultation at . Patient wishes to defer repeat ACTH stimulation until there is a definitive plan from a rheumatologic standpoint. Patient will contact the clinic following her consultation at .  Reviewed sick day rules and stress dosing.  Reviewed signs and symptoms of adrenal crisis and when to seek care  Discussed need for stress dosing with any surgical procedure.    Other orders  -     hydrocortisone (CORTEF) 5 MG tablet; Take 10 mg in the morning and 5 mg in the afternoon  Dispense: 100 tablet; Refill: 5      Return in about 6 months (around 5/15/2022). The patient was instructed to contact the clinic with any interval questions or concerns.    Venita Durán MD   Endocrinologist    Dictated Utilizing Dragon Dictation

## 2021-12-23 ENCOUNTER — OFFICE VISIT (OUTPATIENT)
Dept: INTERNAL MEDICINE | Facility: CLINIC | Age: 46
End: 2021-12-23

## 2021-12-23 VITALS
SYSTOLIC BLOOD PRESSURE: 118 MMHG | WEIGHT: 145.4 LBS | BODY MASS INDEX: 26.76 KG/M2 | HEART RATE: 104 BPM | DIASTOLIC BLOOD PRESSURE: 68 MMHG | OXYGEN SATURATION: 98 % | TEMPERATURE: 97.8 F | HEIGHT: 62 IN

## 2021-12-23 DIAGNOSIS — G89.29 CHRONIC PAIN OF RIGHT ANKLE: ICD-10-CM

## 2021-12-23 DIAGNOSIS — M06.4 INFLAMMATORY POLYARTHROPATHY (HCC): Primary | ICD-10-CM

## 2021-12-23 DIAGNOSIS — M06.4 INFLAMMATORY POLYARTHROPATHY (HCC): ICD-10-CM

## 2021-12-23 DIAGNOSIS — M25.571 CHRONIC PAIN OF RIGHT ANKLE: ICD-10-CM

## 2021-12-23 PROCEDURE — 99213 OFFICE O/P EST LOW 20 MIN: CPT | Performed by: NURSE PRACTITIONER

## 2021-12-23 RX ORDER — IBUPROFEN 600 MG/1
800 TABLET ORAL EVERY 6 HOURS PRN
COMMUNITY
End: 2022-02-18

## 2021-12-23 RX ORDER — TRAMADOL HYDROCHLORIDE 50 MG/1
50 TABLET ORAL EVERY 8 HOURS PRN
Qty: 10 TABLET | Refills: 0 | Status: SHIPPED | OUTPATIENT
Start: 2021-12-23

## 2021-12-23 NOTE — PROGRESS NOTES
Office Visit      Patient Name: Umberto Oliva  : 1975   MRN: [id number]   Care Team: Patient Care Team:  Brooke Greco APRN as PCP - General (Family Medicine)  Sulema Calles PA-C as Physician Assistant (Physician Assistant)  Venita Durán MD as Consulting Physician (Endocrinology)    Chief Complaint  Ankle Pain (right, Pain in right ankle and foot; difficulty walking, swelling, discoloration (purple and red) referral to rheumatology place by gricelda in oct, hasnt been able to be seen by them yet. pt stated pain is getting worse and has been having more and more difficulty walking, using aids (walker and cane) to move about house) and Foot Pain (right )    Subjective     Subjective      Umberto Oliva presents to [facility] for right ankle and foot pain. Symptoms have been present for 5 years. Saw foot and ankle specialist at the beginning of her symptoms who sent her immediately to rheumatology. Saw Dr. Capps with rheumatology for about 1 year tried several different medications, treated for rheumatoid arthritis and eventually for psoriatic arthritis. Was not happy with care so then switched providers and is currently seeing [facility] for rheumatology. She has been on methotrexate, embrel, imuran, humira, and Lyrica without any improvement in her symptoms. She is currently on Cortef and ibuprofen as needed. She just recently had a saphenous vein ablation on the right and thought this would help, this was performed December 3 and symptoms have not improved.  Just stopped Imuran in September, symptoms worse worsening and rheumatologist at [facility] thought medication was making it worse. She was supposed to get rituximab injections, however [facility] recommended seeing rheumatology here in [place] so she could get these closer to home. She was referred to [facility] however she was never contacted for an appointment back in October. She called  "them today and they have informed her she is not a candidate to be seen, did not give her reason why. This is what ultimately brought her in today. She states she is at her wits end with the pain. It is not any worse but it is debilitating, she has to use a walker or cane most days and sometimes  has to pick her up to take her to the bathroom due to the severity. She endorses swelling of the right ankle, pain, and warmth to the area. It is not any worse than her normal today. Previous PCP did prescribe her tramadol which did help her rest at night, she is desperate today for some pain relief until she can be seen by a rheumatologist. She does not want to see Dr. Capps or [facility].    Review of Systems   Constitutional: Positive for fatigue. Negative for appetite change and fever.   HENT: Negative for congestion, sore throat and trouble swallowing.    Eyes: Negative for blurred vision and visual disturbance.   Respiratory: Negative for cough, shortness of breath and wheezing.    Cardiovascular: Negative for chest pain, palpitations and leg swelling.   Gastrointestinal: Negative for abdominal pain, blood in stool, constipation, diarrhea and nausea.   Endocrine: Negative for polydipsia, polyphagia and polyuria.   Genitourinary: Negative for dysuria.   Musculoskeletal: Positive for arthralgias and joint swelling. Negative for back pain and myalgias.   Skin: Negative for rash.   Neurological: Positive for weakness. Negative for dizziness, light-headedness and headache.   Psychiatric/Behavioral: Negative for sleep disturbance and depressed mood. The patient is nervous/anxious.        Objective     Objective   Vital Signs:   /68   Pulse 104   Temp 97.8 °F (36.6 °C) (Temporal)   Ht 157.5 cm (62\")   Wt 66 kg (145 lb 6.4 oz)   SpO2 98%   BMI 26.59 kg/m²     Physical Exam  Vitals and nursing note reviewed.   Constitutional:       General: She is in acute distress (Emotional distress).      " Appearance: Normal appearance. She is not toxic-appearing.   Eyes:      Pupils: Pupils are equal, round, and reactive to light.   Neck:      Vascular: No carotid bruit.   Cardiovascular:      Rate and Rhythm: Normal rate and regular rhythm.      Heart sounds: Normal heart sounds. No murmur heard.      Pulmonary:      Effort: Pulmonary effort is normal. No respiratory distress.      Breath sounds: Normal breath sounds. No wheezing.   Abdominal:      General: Bowel sounds are normal. There is no distension.      Palpations: Abdomen is soft.      Tenderness: There is no abdominal tenderness.   Musculoskeletal:      Cervical back: Neck supple. No tenderness.      Right ankle: Swelling present. Tenderness (Diffuse) present. Decreased range of motion.      Right foot: Decreased range of motion. Swelling, deformity and tenderness (Diffuse) present. Normal pulse.      Comments: Dusky erythema to right foot and ankle  1+ nonpitting edema on the right ankle   Skin:     General: Skin is warm and dry.      Findings: No rash.   Neurological:      General: No focal deficit present.      Mental Status: She is alert.   Psychiatric:         Mood and Affect: Mood is anxious. Affect is tearful.         Behavior: Behavior normal.          Assessment / Plan      Assessment/Plan   Problem List Items Addressed This Visit        Musculoskeletal and Injuries    Chronic pain of right ankle    Inflammatory polyarthropathy (HCC) - Primary    Will prescribe tramadol in the interim until she can be reestablished with rheumatology, do think this is a rheumatological issue that needs addressed. We will call  on Monday to determine what happened with referral, may need to go to [place] and she verbalizes understanding. If no improvement from a rheumatology standpoint would refer her to pain management. Advised to follow-up with [facility] regarding the severity of her pain and different treatment options. Recommend rest, ice, elevation,  ibuprofen as needed, and tramadol sparingly. Advised this is a narcotic pain medication that is addictive and needs to be used sparingly, should not drive or drink alcohol with this medication and she verbalized understanding.           Follow Up   Return if symptoms worsen or fail to improve.  Patient was given instructions and counseling regarding her condition or for health maintenance advice. Please see specific information pulled into the AVS if appropriate.     CHELI Hinds  [facility] Psychiatric

## 2022-02-18 ENCOUNTER — OFFICE VISIT (OUTPATIENT)
Dept: INTERNAL MEDICINE | Facility: CLINIC | Age: 47
End: 2022-02-18

## 2022-02-18 VITALS
DIASTOLIC BLOOD PRESSURE: 78 MMHG | TEMPERATURE: 98.2 F | BODY MASS INDEX: 26.28 KG/M2 | HEIGHT: 62 IN | WEIGHT: 142.8 LBS | SYSTOLIC BLOOD PRESSURE: 118 MMHG | OXYGEN SATURATION: 99 % | HEART RATE: 96 BPM

## 2022-02-18 DIAGNOSIS — M06.4 INFLAMMATORY POLYARTHROPATHY: ICD-10-CM

## 2022-02-18 DIAGNOSIS — G89.29 CHRONIC PAIN OF RIGHT ANKLE: Primary | ICD-10-CM

## 2022-02-18 DIAGNOSIS — M25.571 CHRONIC PAIN OF RIGHT ANKLE: Primary | ICD-10-CM

## 2022-02-18 DIAGNOSIS — M25.50 POLYARTHRALGIA: ICD-10-CM

## 2022-02-18 PROCEDURE — 99214 OFFICE O/P EST MOD 30 MIN: CPT | Performed by: NURSE PRACTITIONER

## 2022-02-18 RX ORDER — MELOXICAM 7.5 MG/1
TABLET ORAL
Qty: 60 TABLET | Refills: 1 | Status: SHIPPED | OUTPATIENT
Start: 2022-02-18

## 2022-02-18 RX ORDER — CHOLECALCIFEROL (VITAMIN D3) 125 MCG
500 CAPSULE ORAL DAILY
COMMUNITY

## 2022-02-18 RX ORDER — ELECTROLYTES/DEXTROSE
1 SOLUTION, ORAL ORAL DAILY
COMMUNITY

## 2022-02-18 NOTE — PROGRESS NOTES
"  Office Visit      Patient Name: Umberto Oliva  : 1975   MRN: [id number]   Care Team: Patient Care Team:  Brooke Greco APRN as PCP - General (Family Medicine)  Sulema Calles PA-C as Physician Assistant (Physician Assistant)  Venita Durán MD as Consulting Physician (Endocrinology)    Chief Complaint  Foot Pain (right, denies injury; ongoing for years seen in 2021, was referred to Rheumatologist which she had seen him, he has referred her to Dermatology, and orthopedics ) and Hand Pain (right)    Subjective     Subjective      Umberto Oliva presents to [facility] for chronic pain issues. Has been seeing rheumatology Dr. WILLARD through [facility] most recently for inflammatory polyarthropathy. He has also referred her to both dermatology and ortho. Notes that are available have been reviewed as have labs. She had a biopsy of the right foot which did not show any vascular involvement . She continues to have severe debilitating pain in the right foot/ankle, right hand, and left ankle/foot.  She is on hydrocortisone daily due to adrenal insufficiency after being on high dose steroids for several months at a time due to this problem. She has seen several specialist including [facility] for this problem and has tried immunosuppressants without any remarkable relief. She talked with her rheumatologist who has suggested her to go to pain management and she is interested in pursuing as she feels she has exhausted all other options.     Objective     Objective   Vital Signs:   /78   Pulse 96   Temp 98.2 °F (36.8 °C) (Temporal)   Ht 157.5 cm (62\")   Wt 64.8 kg (142 lb 12.8 oz)   SpO2 99%   BMI 26.12 kg/m²     Physical Exam  Vitals and nursing note reviewed.   Constitutional:       General: She is not in acute distress.     Appearance: Normal appearance. She is not ill-appearing or toxic-appearing.   Eyes:      Pupils: Pupils are equal, round, and " reactive to light.   Cardiovascular:      Rate and Rhythm: Normal rate and regular rhythm.      Heart sounds: Normal heart sounds. No murmur heard.      Pulmonary:      Effort: Pulmonary effort is normal. No respiratory distress.      Breath sounds: Normal breath sounds. No wheezing.   Musculoskeletal:         General: Tenderness (right and left ankle, foot, and right wrist) and deformity present. No swelling.   Skin:     General: Skin is warm and dry.      Comments: Discoloration of right foot, purple/redness which is her baseline     Neurological:      General: No focal deficit present.      Mental Status: She is alert.   Psychiatric:         Mood and Affect: Mood normal.         Behavior: Behavior normal.          Assessment / Plan      Assessment/Plan   Problem List Items Addressed This Visit        Musculoskeletal and Injuries    Chronic pain of right ankle - Primary    Relevant Orders    Ambulatory Referral to Pain Management    Inflammatory polyarthropathy (HCC)    Relevant Medications    meloxicam (Mobic) 7.5 MG tablet    Other Relevant Orders    Ambulatory Referral to Pain Management      Other Visit Diagnoses     Polyarthralgia        Relevant Medications    meloxicam (Mobic) 7.5 MG tablet    Other Relevant Orders    Ambulatory Referral to Pain Management    Kidney function is stable recommend trying daily mobic for pain relief while awaiting pain management referral. She has exhausted most options from a rheumatological standpoint and has tried several different rheumatolgical drugs without major relief. Advised to keep appointment with ortho and pain management referral placed. Stay active with light weight bearing exercise, extra strength tylenol for breakthrough pain, and avoid other NSAIDs while taking mobic. Will check CMP at follow-up.           Follow Up   Return in about 6 weeks (around 4/1/2022) for Annual.  Patient was given instructions and counseling regarding her condition or for health  maintenance advice. Please see specific information pulled into the AVS if appropriate.     CHELI Hinds  [facility]

## 2022-04-29 DIAGNOSIS — R53.83 FATIGUE, UNSPECIFIED TYPE: Primary | ICD-10-CM

## 2022-05-16 ENCOUNTER — OFFICE VISIT (OUTPATIENT)
Dept: ENDOCRINOLOGY | Facility: CLINIC | Age: 47
End: 2022-05-16

## 2022-05-16 VITALS
BODY MASS INDEX: 25.4 KG/M2 | HEART RATE: 112 BPM | HEIGHT: 62 IN | SYSTOLIC BLOOD PRESSURE: 120 MMHG | OXYGEN SATURATION: 94 % | DIASTOLIC BLOOD PRESSURE: 70 MMHG | WEIGHT: 138 LBS

## 2022-05-16 DIAGNOSIS — R76.8 ANTI-TPO ANTIBODIES PRESENT: ICD-10-CM

## 2022-05-16 DIAGNOSIS — Z83.49 FAMILY HISTORY OF THYROID DISEASE: ICD-10-CM

## 2022-05-16 DIAGNOSIS — E27.40 ADRENAL INSUFFICIENCY: Primary | ICD-10-CM

## 2022-05-16 PROCEDURE — 99214 OFFICE O/P EST MOD 30 MIN: CPT | Performed by: INTERNAL MEDICINE

## 2022-05-16 RX ORDER — LEFLUNOMIDE 10 MG/1
10 TABLET ORAL DAILY
COMMUNITY

## 2022-05-16 RX ORDER — HYDROCORTISONE 5 MG/1
TABLET ORAL
Qty: 100 TABLET | Refills: 5 | Status: SHIPPED | OUTPATIENT
Start: 2022-05-16

## 2022-05-16 NOTE — PROGRESS NOTES
"Chief Complaint   Patient presents with   • Adrenal Problem     insufficiency        HPI   Umberto Oliva is a 47 y.o. female had concerns including Adrenal Problem (insufficiency).     Patient reports she has seen multiple providers in the interim since last visit including both a rheumatologist and orthopedist at the [facility] as well as a new dermatologist.  She reports that skin biopsy with dermatology and tissue taken by orthopedics revealed inflammation.  She reports rheumatology ultimately diagnosed her with seronegative RA.  She reports that both providers have wanted to give her steroid tapers but states she was hesitant to do this as she was not sure what this might affect her hydrocortisone.  She was recently started on arava but only started medication last week.    Patient also contacted the clinic in the interim following last visit requesting screening for thyroid dysfunction which was completed.    Regarding her adrenal insufficiency, patient continues on hydrocortisone 10 mg in the morning, 5 mg in the afternoon.  She does report that she stressed doses of hydrocortisone the day before and the day following surgery, no other recent stress dosing.  She does report that she may have to go ahead and take steroid taper due to ongoing pain from her recent flare of disease in her right foot which is making it difficult to ambulate and has been ongoing for approximately 6 weeks.    The following portions of the patient's history were reviewed and updated as appropriate: allergies, current medications and past social history.    Review of Systems   Constitutional: Negative for unexpected weight gain and unexpected weight loss.   Cardiovascular: Positive for leg swelling.   Musculoskeletal: Positive for arthralgias and gait problem.   Neurological: Negative for dizziness.      /70 (BP Location: Right arm, Patient Position: Sitting, Cuff Size: Adult)   Pulse 112   Ht 157.5 cm (62\")   Wt 62.6 kg " (138 lb)   SpO2 94%   BMI 25.24 kg/m²      Physical Exam      Constitutional:  well developed; well nourished  no acute distress   ENT/Thyroid: not examined   Eyes: Conjunctiva: clear   Respiratory:  breathing is unlabored  clear to auscultation bilaterally   Cardiovascular:  regular rate and rhythm   Chest:  Not performed.   Abdomen: soft, non-tender; no masses   : Not performed.   Musculoskeletal: Not performed   Skin: not performed.   Neuro: mental status, speech normal   Psych: mood and affect are within normal limits       Labs/Imaging  TSH 1.25  Free T4 1.01  TPO antibodies 9    Diagnoses and all orders for this visit:    1. Adrenal insufficiency (HCC) (Primary)  -Secondary to chronic steroid exposure  -Patient currently taking physiologic doses of hydrocortisone, 10 mg in the morning and 5 mg in the afternoon  -Patient failed ACTH stimulation in July 2021  -Discussed potential timing of repeat ACTH stimulation.  Patient does report she is debating doing a steroid burst with rheumatology secondary to an ongoing flare in her right foot.  Patient did recently start a new therapy with rheumatology and would like to defer ACTH stimulation until she is sure she is not likely to need further steroid treatment.  -Tentatively plan for repeat ACTH stimulation in 6 months   -Discussed with patient that she cannot discontinue steroids until she has passed ACTH stimulation.  -Reviewed sick day rules and stress dosing.  Reviewed signs and symptoms of adrenal crisis and when to seek care.    2. Family history of thyroid disease  3. Anti-TPO antibodies present  -Patient contacted the clinic in the interim between visits and requested screening for thyroid dysfunction.  TSH and free T4 were normal.  Patient did have detectable but not elevated TPO antibodies.  Reviewed that TPO bodies are a risk factor but not a guarantee of future thyroid dysfunction.  Discussed recommendation for annual screening, sooner should  concerning changes arise.    Other orders  -     hydrocortisone (CORTEF) 5 MG tablet; Take 10 mg in the morning and 5 mg in the afternoon  Dispense: 100 tablet; Refill: 5      Return in about 6 months (around 11/16/2022) for Next scheduled follow up. The patient was instructed to contact the clinic with any interval questions or concerns.    Venita Durán MD   Endocrinologist    Dictated Utilizing Dragon Dictation

## 2023-09-20 ENCOUNTER — TRANSCRIBE ORDERS (OUTPATIENT)
Dept: INFUSION THERAPY | Facility: HOSPITAL | Age: 48
End: 2023-09-20
Payer: COMMERCIAL

## 2023-09-20 DIAGNOSIS — M06.4 INFLAMMATORY POLYARTHRITIS: Primary | ICD-10-CM

## 2023-10-04 PROBLEM — M06.9 RHEUMATOID ARTHRITIS: Status: ACTIVE | Noted: 2023-10-04

## 2023-10-04 RX ORDER — DIPHENHYDRAMINE HYDROCHLORIDE 50 MG/ML
50 INJECTION INTRAMUSCULAR; INTRAVENOUS AS NEEDED
Status: CANCELLED | OUTPATIENT
Start: 2023-10-10

## 2023-10-04 RX ORDER — METHYLPREDNISOLONE SODIUM SUCCINATE 125 MG/2ML
125 INJECTION, POWDER, LYOPHILIZED, FOR SOLUTION INTRAMUSCULAR; INTRAVENOUS ONCE
Status: CANCELLED
Start: 2023-10-10 | End: 2023-10-10

## 2023-10-04 RX ORDER — FAMOTIDINE 10 MG/ML
20 INJECTION, SOLUTION INTRAVENOUS AS NEEDED
Status: CANCELLED | OUTPATIENT
Start: 2023-10-10

## 2023-10-04 RX ORDER — DIPHENHYDRAMINE HYDROCHLORIDE 50 MG/ML
50 INJECTION INTRAMUSCULAR; INTRAVENOUS AS NEEDED
OUTPATIENT
Start: 2023-10-10

## 2023-10-04 RX ORDER — ACETAMINOPHEN 325 MG/1
650 TABLET ORAL ONCE
OUTPATIENT
Start: 2023-10-10

## 2023-10-04 RX ORDER — ACETAMINOPHEN 325 MG/1
650 TABLET ORAL ONCE
Status: CANCELLED | OUTPATIENT
Start: 2023-10-10

## 2023-10-04 RX ORDER — CETIRIZINE HYDROCHLORIDE 10 MG/1
10 TABLET ORAL DAILY
Start: 2023-10-10

## 2023-10-04 RX ORDER — FAMOTIDINE 20 MG/1
40 TABLET, FILM COATED ORAL ONCE AS NEEDED
Status: CANCELLED
Start: 2023-10-10

## 2023-10-04 RX ORDER — METHYLPREDNISOLONE SODIUM SUCCINATE 125 MG/2ML
125 INJECTION, POWDER, LYOPHILIZED, FOR SOLUTION INTRAMUSCULAR; INTRAVENOUS ONCE
Start: 2023-10-10 | End: 2023-10-10

## 2023-10-04 RX ORDER — SODIUM CHLORIDE 9 MG/ML
250 INJECTION, SOLUTION INTRAVENOUS ONCE
OUTPATIENT
Start: 2023-10-10

## 2023-10-04 RX ORDER — FAMOTIDINE 10 MG/ML
20 INJECTION, SOLUTION INTRAVENOUS AS NEEDED
OUTPATIENT
Start: 2023-10-10

## 2023-10-04 RX ORDER — CETIRIZINE HYDROCHLORIDE 10 MG/1
10 TABLET ORAL DAILY
Status: CANCELLED
Start: 2023-10-10

## 2023-10-04 RX ORDER — MEPERIDINE HYDROCHLORIDE 50 MG/ML
25 INJECTION INTRAMUSCULAR; INTRAVENOUS; SUBCUTANEOUS
OUTPATIENT
Start: 2023-10-10

## 2023-10-04 RX ORDER — MEPERIDINE HYDROCHLORIDE 50 MG/ML
25 INJECTION INTRAMUSCULAR; INTRAVENOUS; SUBCUTANEOUS
Status: CANCELLED | OUTPATIENT
Start: 2023-10-10

## 2023-10-04 RX ORDER — FAMOTIDINE 20 MG/1
40 TABLET, FILM COATED ORAL ONCE AS NEEDED
Start: 2023-10-10

## 2023-10-04 RX ORDER — SODIUM CHLORIDE 9 MG/ML
250 INJECTION, SOLUTION INTRAVENOUS ONCE
Status: CANCELLED | OUTPATIENT
Start: 2023-10-10

## 2023-10-10 ENCOUNTER — HOSPITAL ENCOUNTER (OUTPATIENT)
Dept: ONCOLOGY | Facility: HOSPITAL | Age: 48
Discharge: HOME | End: 2023-10-10
Payer: COMMERCIAL

## 2023-10-17 ENCOUNTER — HOSPITAL ENCOUNTER (OUTPATIENT)
Dept: ONCOLOGY | Facility: HOSPITAL | Age: 48
Discharge: HOME | End: 2023-10-17
Admitting: INTERNAL MEDICINE
Payer: COMMERCIAL

## 2023-10-17 VITALS
HEIGHT: 63 IN | DIASTOLIC BLOOD PRESSURE: 62 MMHG | WEIGHT: 148 LBS | SYSTOLIC BLOOD PRESSURE: 103 MMHG | BODY MASS INDEX: 26.22 KG/M2 | TEMPERATURE: 97.3 F | RESPIRATION RATE: 16 BRPM | HEART RATE: 74 BPM

## 2023-10-17 DIAGNOSIS — M06.9 RHEUMATOID ARTHRITIS, INVOLVING UNSPECIFIED SITE, UNSPECIFIED WHETHER RHEUMATOID FACTOR PRESENT: Primary | ICD-10-CM

## 2023-10-17 PROCEDURE — 25810000003 SODIUM CHLORIDE 0.9 % SOLUTION: Performed by: INTERNAL MEDICINE

## 2023-10-17 PROCEDURE — 25010000002 METHYLPREDNISOLONE PER 125 MG: Performed by: INTERNAL MEDICINE

## 2023-10-17 PROCEDURE — 25010000002 RITUXIMAB-PVVR 500 MG/50ML SOLUTION 50 ML VIAL: Performed by: INTERNAL MEDICINE

## 2023-10-17 PROCEDURE — 96415 CHEMO IV INFUSION ADDL HR: CPT

## 2023-10-17 PROCEDURE — 25810000003 SODIUM CHLORIDE 0.9 % SOLUTION 250 ML FLEX CONT: Performed by: INTERNAL MEDICINE

## 2023-10-17 PROCEDURE — 96375 TX/PRO/DX INJ NEW DRUG ADDON: CPT

## 2023-10-17 PROCEDURE — 96413 CHEMO IV INFUSION 1 HR: CPT

## 2023-10-17 RX ORDER — METHYLPREDNISOLONE SODIUM SUCCINATE 125 MG/2ML
125 INJECTION, POWDER, LYOPHILIZED, FOR SOLUTION INTRAMUSCULAR; INTRAVENOUS ONCE
Status: COMPLETED | OUTPATIENT
Start: 2023-10-17 | End: 2023-10-17

## 2023-10-17 RX ORDER — ACETAMINOPHEN 325 MG/1
650 TABLET ORAL ONCE
Status: COMPLETED | OUTPATIENT
Start: 2023-10-17 | End: 2023-10-17

## 2023-10-17 RX ORDER — CETIRIZINE HYDROCHLORIDE 10 MG/1
10 TABLET ORAL DAILY
Start: 2023-10-17

## 2023-10-17 RX ORDER — OXYCODONE HYDROCHLORIDE AND ACETAMINOPHEN 5; 325 MG/1; MG/1
1 TABLET ORAL ONCE AS NEEDED
COMMUNITY

## 2023-10-17 RX ORDER — FOLIC ACID 1 MG/1
1 TABLET ORAL DAILY
COMMUNITY

## 2023-10-17 RX ORDER — SODIUM CHLORIDE 9 MG/ML
250 INJECTION, SOLUTION INTRAVENOUS ONCE
OUTPATIENT
Start: 2023-10-17

## 2023-10-17 RX ORDER — SODIUM CHLORIDE 9 MG/ML
250 INJECTION, SOLUTION INTRAVENOUS ONCE
Status: COMPLETED | OUTPATIENT
Start: 2023-10-17 | End: 2023-10-17

## 2023-10-17 RX ORDER — MEPERIDINE HYDROCHLORIDE 50 MG/ML
25 INJECTION INTRAMUSCULAR; INTRAVENOUS; SUBCUTANEOUS
OUTPATIENT
Start: 2023-10-17

## 2023-10-17 RX ORDER — METHYLPREDNISOLONE SODIUM SUCCINATE 125 MG/2ML
125 INJECTION, POWDER, LYOPHILIZED, FOR SOLUTION INTRAMUSCULAR; INTRAVENOUS ONCE
Start: 2023-10-17 | End: 2023-10-17

## 2023-10-17 RX ORDER — FAMOTIDINE 10 MG/ML
20 INJECTION, SOLUTION INTRAVENOUS AS NEEDED
OUTPATIENT
Start: 2023-10-17

## 2023-10-17 RX ORDER — DIPHENHYDRAMINE HYDROCHLORIDE 50 MG/ML
50 INJECTION INTRAMUSCULAR; INTRAVENOUS AS NEEDED
OUTPATIENT
Start: 2023-10-17

## 2023-10-17 RX ORDER — METHYLPREDNISOLONE 4 MG/1
4 TABLET ORAL DAILY
COMMUNITY

## 2023-10-17 RX ORDER — CETIRIZINE HYDROCHLORIDE 10 MG/1
10 TABLET ORAL DAILY
Status: DISCONTINUED | OUTPATIENT
Start: 2023-10-17 | End: 2023-10-18 | Stop reason: HOSPADM

## 2023-10-17 RX ORDER — FAMOTIDINE 20 MG/1
40 TABLET, FILM COATED ORAL ONCE AS NEEDED
Start: 2023-10-17

## 2023-10-17 RX ORDER — ACETAMINOPHEN 325 MG/1
650 TABLET ORAL ONCE
OUTPATIENT
Start: 2023-10-17

## 2023-10-17 RX ADMIN — ACETAMINOPHEN 650 MG: 325 TABLET ORAL at 08:57

## 2023-10-17 RX ADMIN — METHYLPREDNISOLONE SODIUM SUCCINATE 125 MG: 125 INJECTION, POWDER, FOR SOLUTION INTRAMUSCULAR; INTRAVENOUS at 08:58

## 2023-10-17 RX ADMIN — CETIRIZINE HYDROCHLORIDE 10 MG: 10 TABLET, FILM COATED ORAL at 08:57

## 2023-10-17 RX ADMIN — SODIUM CHLORIDE 250 ML: 9 INJECTION, SOLUTION INTRAVENOUS at 08:58

## 2023-10-17 RX ADMIN — SODIUM CHLORIDE 1000 MG: 9 INJECTION, SOLUTION INTRAVENOUS at 09:33

## 2023-10-31 ENCOUNTER — HOSPITAL ENCOUNTER (OUTPATIENT)
Dept: ONCOLOGY | Facility: HOSPITAL | Age: 48
Discharge: HOME | End: 2023-10-31
Admitting: INTERNAL MEDICINE
Payer: COMMERCIAL

## 2023-10-31 VITALS
RESPIRATION RATE: 18 BRPM | TEMPERATURE: 97.6 F | SYSTOLIC BLOOD PRESSURE: 104 MMHG | HEART RATE: 72 BPM | WEIGHT: 146 LBS | BODY MASS INDEX: 25.87 KG/M2 | DIASTOLIC BLOOD PRESSURE: 50 MMHG | HEIGHT: 63 IN

## 2023-10-31 DIAGNOSIS — M06.9 RHEUMATOID ARTHRITIS, INVOLVING UNSPECIFIED SITE, UNSPECIFIED WHETHER RHEUMATOID FACTOR PRESENT: Primary | ICD-10-CM

## 2023-10-31 PROCEDURE — 96413 CHEMO IV INFUSION 1 HR: CPT

## 2023-10-31 PROCEDURE — 25810000003 SODIUM CHLORIDE 0.9 % SOLUTION 250 ML FLEX CONT: Performed by: INTERNAL MEDICINE

## 2023-10-31 PROCEDURE — 96415 CHEMO IV INFUSION ADDL HR: CPT

## 2023-10-31 PROCEDURE — 25010000002 RITUXIMAB-PVVR 500 MG/50ML SOLUTION 50 ML VIAL: Performed by: INTERNAL MEDICINE

## 2023-10-31 PROCEDURE — 96366 THER/PROPH/DIAG IV INF ADDON: CPT

## 2023-10-31 PROCEDURE — 25010000002 METHYLPREDNISOLONE PER 125 MG: Performed by: INTERNAL MEDICINE

## 2023-10-31 PROCEDURE — 25810000003 SODIUM CHLORIDE 0.9 % SOLUTION: Performed by: INTERNAL MEDICINE

## 2023-10-31 PROCEDURE — 96375 TX/PRO/DX INJ NEW DRUG ADDON: CPT

## 2023-10-31 PROCEDURE — 96365 THER/PROPH/DIAG IV INF INIT: CPT

## 2023-10-31 RX ORDER — DIPHENHYDRAMINE HYDROCHLORIDE 50 MG/ML
50 INJECTION INTRAMUSCULAR; INTRAVENOUS AS NEEDED
OUTPATIENT
Start: 2023-10-31

## 2023-10-31 RX ORDER — METHYLPREDNISOLONE SODIUM SUCCINATE 125 MG/2ML
125 INJECTION, POWDER, LYOPHILIZED, FOR SOLUTION INTRAMUSCULAR; INTRAVENOUS ONCE
Start: 2023-10-31 | End: 2023-10-31

## 2023-10-31 RX ORDER — ACETAMINOPHEN 325 MG/1
650 TABLET ORAL ONCE
Status: COMPLETED | OUTPATIENT
Start: 2023-10-31 | End: 2023-10-31

## 2023-10-31 RX ORDER — CETIRIZINE HYDROCHLORIDE 10 MG/1
10 TABLET ORAL DAILY
Start: 2023-10-31

## 2023-10-31 RX ORDER — SODIUM CHLORIDE 9 MG/ML
250 INJECTION, SOLUTION INTRAVENOUS ONCE
OUTPATIENT
Start: 2023-10-31

## 2023-10-31 RX ORDER — MEPERIDINE HYDROCHLORIDE 50 MG/ML
25 INJECTION INTRAMUSCULAR; INTRAVENOUS; SUBCUTANEOUS
OUTPATIENT
Start: 2023-10-31

## 2023-10-31 RX ORDER — SODIUM CHLORIDE 9 MG/ML
250 INJECTION, SOLUTION INTRAVENOUS ONCE
Status: COMPLETED | OUTPATIENT
Start: 2023-10-31 | End: 2023-10-31

## 2023-10-31 RX ORDER — ACETAMINOPHEN 325 MG/1
650 TABLET ORAL ONCE
OUTPATIENT
Start: 2023-10-31

## 2023-10-31 RX ORDER — FAMOTIDINE 10 MG/ML
20 INJECTION, SOLUTION INTRAVENOUS AS NEEDED
OUTPATIENT
Start: 2023-10-31

## 2023-10-31 RX ORDER — FAMOTIDINE 20 MG/1
40 TABLET, FILM COATED ORAL ONCE AS NEEDED
Start: 2023-10-31

## 2023-10-31 RX ORDER — CETIRIZINE HYDROCHLORIDE 10 MG/1
10 TABLET ORAL DAILY
Status: DISCONTINUED | OUTPATIENT
Start: 2023-10-31 | End: 2023-11-01 | Stop reason: HOSPADM

## 2023-10-31 RX ORDER — METHYLPREDNISOLONE SODIUM SUCCINATE 125 MG/2ML
125 INJECTION, POWDER, LYOPHILIZED, FOR SOLUTION INTRAMUSCULAR; INTRAVENOUS ONCE
Status: COMPLETED | OUTPATIENT
Start: 2023-10-31 | End: 2023-10-31

## 2023-10-31 RX ADMIN — ACETAMINOPHEN 650 MG: 325 TABLET ORAL at 08:38

## 2023-10-31 RX ADMIN — METHYLPREDNISOLONE SODIUM SUCCINATE 125 MG: 125 INJECTION INTRAMUSCULAR; INTRAVENOUS at 08:38

## 2023-10-31 RX ADMIN — CETIRIZINE HYDROCHLORIDE 10 MG: 10 TABLET, FILM COATED ORAL at 08:38

## 2023-10-31 RX ADMIN — SODIUM CHLORIDE 1000 MG: 9 INJECTION, SOLUTION INTRAVENOUS at 09:13

## 2023-10-31 RX ADMIN — SODIUM CHLORIDE 250 ML: 9 INJECTION, SOLUTION INTRAVENOUS at 08:37

## 2024-03-08 ENCOUNTER — TRANSCRIBE ORDERS (OUTPATIENT)
Dept: ADMINISTRATIVE | Facility: HOSPITAL | Age: 49
End: 2024-03-08
Payer: COMMERCIAL

## 2024-03-08 DIAGNOSIS — R10.11 ABDOMINAL PAIN, RIGHT UPPER QUADRANT: Primary | ICD-10-CM

## 2024-06-26 ENCOUNTER — OFFICE VISIT (OUTPATIENT)
Dept: ENDOCRINOLOGY | Facility: CLINIC | Age: 49
End: 2024-06-26
Payer: COMMERCIAL

## 2024-06-26 VITALS
OXYGEN SATURATION: 98 % | DIASTOLIC BLOOD PRESSURE: 70 MMHG | WEIGHT: 149 LBS | BODY MASS INDEX: 26.4 KG/M2 | SYSTOLIC BLOOD PRESSURE: 100 MMHG | HEART RATE: 68 BPM | HEIGHT: 63 IN

## 2024-06-26 DIAGNOSIS — R76.8 THYROID ANTIBODY POSITIVE: Chronic | ICD-10-CM

## 2024-06-26 DIAGNOSIS — Z79.52 CURRENT CHRONIC USE OF SYSTEMIC STEROIDS: Chronic | ICD-10-CM

## 2024-06-26 DIAGNOSIS — E27.40 ADRENAL INSUFFICIENCY: Primary | Chronic | ICD-10-CM

## 2024-06-26 PROCEDURE — 99214 OFFICE O/P EST MOD 30 MIN: CPT | Performed by: PHYSICIAN ASSISTANT

## 2024-06-26 RX ORDER — ESTRADIOL 0.1 MG/G
2 CREAM VAGINAL AS NEEDED
COMMUNITY
Start: 2024-03-05 | End: 2024-06-26

## 2024-06-26 RX ORDER — ERGOCALCIFEROL 1.25 MG/1
1 CAPSULE ORAL WEEKLY
COMMUNITY
Start: 2024-05-20

## 2024-06-26 RX ORDER — MECLIZINE HYDROCHLORIDE 25 MG/1
25 TABLET ORAL AS NEEDED
COMMUNITY
Start: 2024-06-11 | End: 2024-06-26

## 2024-06-26 RX ORDER — ONDANSETRON 4 MG/1
1 TABLET, FILM COATED ORAL EVERY 8 HOURS PRN
COMMUNITY

## 2024-06-26 RX ORDER — HYDROCORTISONE 5 MG/1
TABLET ORAL
Qty: 90 TABLET | Refills: 2 | Status: SHIPPED | OUTPATIENT
Start: 2024-06-26

## 2024-06-26 NOTE — PROGRESS NOTES
"    Chief Complaint:  Umberto Oliva is a 49 y.o. female. Is being seen today for adrenal insufficiency    HPI  49 y.o. female with seronegative RA presents for f/u of adrenal insufficiency related to chronic steroid use.   Last apptL 5/2022 with Dr. Durán    At last appt she was on hydrocortisone 10 mg qAM and 5 mg qPM  She has been on varying doses of steroids since then, prescribed by rheumatology. Currently on methylprednisolone 4 mg daily and has been on this dose for a while. She expressed to rheumatology she would like to see if she can wean off steroids and they recommended she come back to endocrinology.   She is mostly stable from a rheumatology perspective though still struggles with decreased mobility and pain. Now able to walk without a cane.   She had COVID-19 earlier this month. Did not require hospitalization. Did not take extra methylprednisolone and didn't have any issues.   Otherwise no recent illness or hospitalization.   Hx of elevated TPO. TFTs were normal in the past.     The following portions of the patient's history were reviewed and updated as appropriate: allergies, current medications, past family history, past medical history, past social history, past surgical history and problem list.    Allergies   Allergen Reactions    Pregabalin Dizziness, Nausea Only and Other (See Comments)     \"Hard to function\", per patient.       Current Outpatient Medications on File Prior to Visit   Medication Sig Dispense Refill    Methotrexate 15 MG/0.6ML solution prefilled syringe Inject 15 mg under the skin into the appropriate area as directed 1 (One) Time Per Week. 50 mg / 2 ml vial      ondansetron (ZOFRAN) 4 MG tablet Take 1 tablet by mouth Every 8 (Eight) Hours As Needed.      oxyCODONE-acetaminophen (PERCOCET) 5-325 MG per tablet Take 1 tablet by mouth 1 (One) Time As Needed. 1 x day prn      promethazine (PHENERGAN) 12.5 MG tablet Take 1 tablet by mouth Every 6 (Six) Hours As Needed for Nausea or " "Vomiting. May take 2 pills per dose if needed. 30 tablet 0    riTUXimab (RITUXAN) 10 MG/ML solution injection Infuse  into a venous catheter. [id number], then 2 weeks after the 4 month, then 4 months later.      Syringe/Needle, Disp, 25G X 5/8\" 1 ML misc 1 each As Needed (use with B12 injections). 50 each 1    vitamin B-12 (CYANOCOBALAMIN) 500 MCG tablet Take 1 tablet by mouth Daily.      vitamin D (ERGOCALCIFEROL) 1.25 MG (76635 UT) capsule capsule Take 1 capsule by mouth 1 (One) Time Per Week.      [DISCONTINUED] methylPREDNISolone (MEDROL) 4 MG tablet Take 1 tablet by mouth Daily.      [DISCONTINUED] albuterol sulfate  (90 Base) MCG/ACT inhaler Inhale 2 puffs Every 4 (Four) Hours As Needed for Wheezing. (Patient not taking: Reported on 6/26/2024) 8 g 0    [DISCONTINUED] estradiol (ESTRACE) 0.1 MG/GM vaginal cream Insert 2 g into the vagina As Needed. (Patient not taking: Reported on 6/26/2024)      [DISCONTINUED] fluticasone (FLONASE) 50 MCG/ACT nasal spray 2 sprays into the nostril(s) as directed by provider Daily. (Patient not taking: Reported on 6/26/2024)      [DISCONTINUED] folic acid (FOLVITE) 1 MG tablet Take 1 tablet by mouth Daily. (Patient not taking: Reported on 6/26/2024)      [DISCONTINUED] hydrocortisone (CORTEF) 5 MG tablet Take 10 mg in the morning and 5 mg in the afternoon (Patient not taking: Reported on 6/26/2024) 100 tablet 5    [DISCONTINUED] ipratropium-albuterol (DUO-NEB) 0.5-2.5 mg/3 ml nebulizer Take 3 mL by nebulization Every 4 (Four) Hours As Needed for Wheezing. (Patient not taking: Reported on 6/26/2024) 120 mL 0    [DISCONTINUED] leflunomide (ARAVA) 10 MG tablet Take 10 mg by mouth Daily. 10 mg daily for 2 weeks then will go to 20 mg  daily (Patient not taking: Reported on 6/26/2024)      [DISCONTINUED] meclizine (ANTIVERT) 25 MG tablet Take 1 tablet by mouth As Needed. (Patient not taking: Reported on 6/26/2024)      [DISCONTINUED] meloxicam (Mobic) 7.5 MG tablet Take 1 to 2 " tablets daily for pain (Patient not taking: Reported on 2024) 60 tablet 1    [DISCONTINUED] methotrexate 2.5 MG tablet Take  by mouth 1 (One) Time Per Week. (Patient not taking: Reported on 2024)      [DISCONTINUED] omeprazole (priLOSEC) 20 MG capsule Take 20 mg by mouth Daily. (Patient not taking: Reported on 2024)      [DISCONTINUED] Pyridoxine HCl (Vitamin B6) 100 MG tablet Take 1 tablet by mouth Daily. (Patient not taking: Reported on 2024)      [DISCONTINUED] SUMAtriptan (IMITREX) 100 MG tablet Take 1 tablet by mouth 1 (One) Time As Needed for Migraine (may repeat once in 2 hours if needed). (Patient not taking: Reported on 2024) 9 tablet 5    [DISCONTINUED] traMADol (ULTRAM) 50 MG tablet Take 1 tablet by mouth Every 8 (Eight) Hours As Needed for Severe Pain . (Patient not taking: Reported on 2024) 10 tablet 0     No current facility-administered medications on file prior to visit.       Past Medical History:   Diagnosis Date    Adrenal adenoma     Arthritis     Headache     History of endometriosis     s/p MAXI/BSO    History of ovarian cyst     multiple laparoscopic and open procedures prior to MAXI/BSO    Migraines     Nephrolithiasis     multiple, all have passed without surgical intervention    Vasculitis        Past Surgical History:   Procedure Laterality Date    APPENDECTOMY      appendix removed at some [facility] during a gynecologic procedure     SECTION  2002    for twins    COLON SURGERY      DIAGNOSTIC LAPAROSCOPY      multiple in teens and early adulthood for ovarian cysts and endometriosis    ENDOVENOUS ABLATION SAPHENOUS VEIN W/ LASER      EXPLORATORY LAPAROTOMY      missed enterotomy during laparoscopic procedure    EXPLORATORY LAPAROTOMY W/ BOWEL RESECTION      ex-lap via paramedian incision with small bowel resection    FULGURATION ENDOMETRIOSIS      multiple procedures for endometriosis as teenage    LAPAROSCOPIC OVARIAN CYSTECTOMY      TOTAL  "ABDOMINAL HYSTERECTOMY WITH SALPINGO OOPHORECTOMY  07/24/2012    via paramedian incision; had bladder injury treated with eagle catheter decompression       Review of Systems  Review of Systems   Constitutional:  Positive for fatigue.   Musculoskeletal:  Positive for arthralgias.   All other systems reviewed and are negative.      Physical Exam   Objective   Blood pressure 100/70, pulse 68, height 160 cm (63\"), weight 67.6 kg (149 lb), SpO2 98%, not currently breastfeeding.  Body mass index is 26.39 kg/m².    Physical Exam  Constitutional:       General: She is not in acute distress.     Appearance: She is well-developed. She is not diaphoretic.   Eyes:      General: Lids are normal.   Neck:      Thyroid: No thyroid mass or thyromegaly.      Vascular: No JVD.      Trachea: No tracheal deviation.   Cardiovascular:      Rate and Rhythm: Normal rate and regular rhythm.   Pulmonary:      Effort: Pulmonary effort is normal.      Breath sounds: Normal breath sounds.   Musculoskeletal:         General: No tenderness.   Lymphadenopathy:      Cervical: No cervical adenopathy.   Skin:     General: Skin is warm and dry.      Findings: No erythema or rash.   Neurological:      Mental Status: She is alert and oriented to person, place, and time.   Psychiatric:         Speech: Speech normal.         Behavior: Behavior normal.         Thought Content: Thought content normal.             Assessment / Plan    Diagnoses and all orders for this visit:    1. Adrenal insufficiency (Primary)  -     hydrocortisone (CORTEF) 5 MG tablet; Take 2 pill in the AM and 1 pill in the PM, replacing methylprednisolone  Dispense: 90 tablet; Refill: 2  -     Cancel: Cortisol  -     Cancel: Cortisol  -     Cancel: Cortisol  -     Cancel: Comprehensive Metabolic Panel  -     Cortisol; Future  -     Cortisol; Future  -     Cortisol; Future  -     Comprehensive Metabolic Panel; Future    2. Current chronic use of systemic steroids  -     Hemoglobin A1c; " Future    3. Thyroid antibody positive  -     TSH; Future  -     T4, Free; Future        Assessment & Plan     Adrenal insufficiency  -due to chronic steroid use  -patient would like to see if she can taper off steroids  -plan to change methylprednisolone to hydrocortisone  -week 1: HC 5 mg 2 pills qAM, 1.5 pill qPM  -week 2: HC 5 mg 2 pills qAM, 1 pill qPM  -once on physiologic dose x 6-8 weeks can come into the office for ACTH stimulation test  -advised to come in at 8 AM for the testing, hold HC the morning of testing and the afternoon the day before   -if symptomatic with holding the HC to take it but testing will need to be postponed   -advised not to abruptly stop steroids     Chronic use of steroids  -plan to check A1C with upcoming labs    Thyroid antibody positive  -hx of elevated TPO  -check TFTs with upcoming labs    Follow Up: Return in about 3 months (around 9/26/2024). With Dr. Durán    Patient Instructions:   Patient Instructions   Stop methylprednisolone  Start hydrocortisone 5 mg 2 pills in the morning, 1.5 pills mid afternoon  After a week - continue 2 pill in the morning and decrease to just 1 pill in the afternoon    In 6-8 weeks come to our office at 8 AM to have ACTH stimulation test done (will need to be here for about an hour)       Signed by: Gamal Gonzalez PA-C  Endocrinology  06/26/2024

## 2024-06-26 NOTE — LETTER
June 26, 2024    Umberto Oliva  [address] [place] [id number]    Stop methylprednisolone  Start hydrocortisone 5 mg 2 pills in the morning, 1.5 pills mid afternoon  After a week - continue 2 pill in the morning and decrease to just 1 pill in the afternoon    In 6-8 weeks come to our office at 8 AM to have ACTH stimulation test done (will need to be here for about an hour)                            Gamal Gonzalez PA-C

## 2024-06-26 NOTE — PATIENT INSTRUCTIONS
Stop methylprednisolone  Start hydrocortisone 5 mg 2 pills in the morning, 1.5 pills mid afternoon  After a week - continue 2 pill in the morning and decrease to just 1 pill in the afternoon    In 6-8 weeks come to our office at 8 AM to have ACTH stimulation test done (will need to be here for about an hour)

## 2024-08-07 ENCOUNTER — LAB (OUTPATIENT)
Dept: ENDOCRINOLOGY | Facility: CLINIC | Age: 49
End: 2024-08-07
Payer: COMMERCIAL

## 2024-08-07 ENCOUNTER — CLINICAL SUPPORT (OUTPATIENT)
Dept: ENDOCRINOLOGY | Facility: CLINIC | Age: 49
End: 2024-08-07
Payer: COMMERCIAL

## 2024-08-07 DIAGNOSIS — R76.8 THYROID ANTIBODY POSITIVE: Chronic | ICD-10-CM

## 2024-08-07 DIAGNOSIS — E27.40 ADRENAL INSUFFICIENCY: Chronic | ICD-10-CM

## 2024-08-07 DIAGNOSIS — E27.40 ADRENAL INSUFFICIENCY: Primary | ICD-10-CM

## 2024-08-07 DIAGNOSIS — Z79.52 CURRENT CHRONIC USE OF SYSTEMIC STEROIDS: Chronic | ICD-10-CM

## 2024-08-07 LAB — HBA1C MFR BLD: 5.6 % (ref 4.8–5.6)

## 2024-08-07 PROCEDURE — 36415 COLL VENOUS BLD VENIPUNCTURE: CPT | Performed by: PHYSICIAN ASSISTANT

## 2024-08-07 PROCEDURE — 84443 ASSAY THYROID STIM HORMONE: CPT | Performed by: PHYSICIAN ASSISTANT

## 2024-08-07 PROCEDURE — 96372 THER/PROPH/DIAG INJ SC/IM: CPT | Performed by: PHYSICIAN ASSISTANT

## 2024-08-07 PROCEDURE — 82533 TOTAL CORTISOL: CPT | Performed by: PHYSICIAN ASSISTANT

## 2024-08-07 PROCEDURE — 80053 COMPREHEN METABOLIC PANEL: CPT | Performed by: PHYSICIAN ASSISTANT

## 2024-08-07 PROCEDURE — 83036 HEMOGLOBIN GLYCOSYLATED A1C: CPT | Performed by: PHYSICIAN ASSISTANT

## 2024-08-07 PROCEDURE — 84439 ASSAY OF FREE THYROXINE: CPT | Performed by: PHYSICIAN ASSISTANT

## 2024-08-07 RX ORDER — COSYNTROPIN 0.25 MG/ML
0.25 INJECTION, POWDER, FOR SOLUTION INTRAMUSCULAR; INTRAVENOUS ONCE
Status: COMPLETED | OUTPATIENT
Start: 2024-08-07 | End: 2024-08-07

## 2024-08-07 RX ADMIN — COSYNTROPIN 0.25 MG: 0.25 INJECTION, POWDER, FOR SOLUTION INTRAMUSCULAR; INTRAVENOUS at 08:34

## 2024-08-08 LAB
ALBUMIN SERPL-MCNC: 4.2 G/DL (ref 3.5–5.2)
ALBUMIN/GLOB SERPL: 1.6 G/DL
ALP SERPL-CCNC: 83 U/L (ref 39–117)
ALT SERPL W P-5'-P-CCNC: 13 U/L (ref 1–33)
ANION GAP SERPL CALCULATED.3IONS-SCNC: 13.9 MMOL/L (ref 5–15)
AST SERPL-CCNC: 23 U/L (ref 1–32)
BILIRUB SERPL-MCNC: <0.2 MG/DL (ref 0–1.2)
BUN SERPL-MCNC: 12 MG/DL (ref 6–20)
BUN/CREAT SERPL: 16.4 (ref 7–25)
CALCIUM SPEC-SCNC: 9.3 MG/DL (ref 8.6–10.5)
CHLORIDE SERPL-SCNC: 104 MMOL/L (ref 98–107)
CO2 SERPL-SCNC: 21.1 MMOL/L (ref 22–29)
CORTIS SERPL-MCNC: 14.8 MCG/DL
CORTIS SERPL-MCNC: 16.3 MCG/DL
CORTIS SERPL-MCNC: 8.7 MCG/DL
CREAT SERPL-MCNC: 0.73 MG/DL (ref 0.57–1)
EGFRCR SERPLBLD CKD-EPI 2021: 102.2 ML/MIN/1.73
GLOBULIN UR ELPH-MCNC: 2.7 GM/DL
GLUCOSE SERPL-MCNC: 72 MG/DL (ref 65–99)
POTASSIUM SERPL-SCNC: 4.1 MMOL/L (ref 3.5–5.2)
PROT SERPL-MCNC: 6.9 G/DL (ref 6–8.5)
SODIUM SERPL-SCNC: 139 MMOL/L (ref 136–145)
T4 FREE SERPL-MCNC: 0.96 NG/DL (ref 0.92–1.68)
TSH SERPL DL<=0.05 MIU/L-ACNC: 4.2 UIU/ML (ref 0.27–4.2)

## 2024-08-09 DIAGNOSIS — E27.40 ADRENAL INSUFFICIENCY: Primary | ICD-10-CM

## 2024-10-04 ENCOUNTER — LAB (OUTPATIENT)
Dept: ENDOCRINOLOGY | Facility: CLINIC | Age: 49
End: 2024-10-04
Payer: COMMERCIAL

## 2024-10-04 DIAGNOSIS — E27.40 ADRENAL INSUFFICIENCY: ICD-10-CM

## 2024-10-04 LAB — CORTIS AM PEAK SERPL-MCNC: 10.32 MCG/DL

## 2024-10-04 PROCEDURE — 82533 TOTAL CORTISOL: CPT | Performed by: PHYSICIAN ASSISTANT

## 2024-10-04 PROCEDURE — 36415 COLL VENOUS BLD VENIPUNCTURE: CPT | Performed by: PHYSICIAN ASSISTANT

## 2024-10-09 ENCOUNTER — OFFICE VISIT (OUTPATIENT)
Dept: ENDOCRINOLOGY | Facility: CLINIC | Age: 49
End: 2024-10-09
Payer: COMMERCIAL

## 2024-10-09 VITALS
HEART RATE: 78 BPM | DIASTOLIC BLOOD PRESSURE: 70 MMHG | SYSTOLIC BLOOD PRESSURE: 110 MMHG | WEIGHT: 146.6 LBS | OXYGEN SATURATION: 98 % | HEIGHT: 63 IN | BODY MASS INDEX: 25.98 KG/M2

## 2024-10-09 DIAGNOSIS — R76.8 ANTI-TPO ANTIBODIES PRESENT: ICD-10-CM

## 2024-10-09 DIAGNOSIS — E27.40 ADRENAL INSUFFICIENCY: Primary | ICD-10-CM

## 2024-10-09 PROCEDURE — 99214 OFFICE O/P EST MOD 30 MIN: CPT | Performed by: INTERNAL MEDICINE

## 2024-10-09 RX ORDER — TOCILIZUMAB 20 MG/ML
INJECTION, SOLUTION, CONCENTRATE INTRAVENOUS
COMMUNITY
Start: 2024-09-17

## 2024-10-09 RX ORDER — SYRINGE WITH NEEDLE, 1 ML 25GX1"
SYRINGE, EMPTY DISPOSABLE MISCELLANEOUS
COMMUNITY
Start: 2024-08-21

## 2024-10-09 NOTE — PROGRESS NOTES
"Chief Complaint   Patient presents with    Adrenal insufficiency        HPI   Umberto Oliva is a 49 y.o. female had concerns including Adrenal insufficiency.      Patient presents for follow-up.  She was last seen by Gamal Gonzalez.  She underwent ACTH stimulation followed by steroid taper and follow-up a.m. cortisol in the interim since last visit.    Patient reports that she has been off hydrocortisone for 1 month. She does report that since discontinuation that her rheumatologist did a steroid taper for rheumatologic purposes and also reports that she received a steroid injection from orthopedics.    The following portions of the patient's history were reviewed and updated as appropriate: allergies, current medications, and past social history.    Review of Systems   Musculoskeletal:  Positive for arthralgias.      /70   Pulse 78   Ht 160 cm (62.99\")   Wt 66.5 kg (146 lb 9.6 oz)   SpO2 98%   BMI 25.98 kg/m²      Physical Exam      Constitutional:  well developed; well nourished  no acute distress   ENT/Thyroid: not examined   Eyes: Conjunctiva: clear   Respiratory:  breathing is unlabored  clear to auscultation bilaterally   Cardiovascular:  regular rate and rhythm   Chest:  Not performed.   Abdomen: Not performed.   : Not performed.   Musculoskeletal: Not performed   Skin: not performed.   Neuro: mental status, speech normal   Psych: mood and affect are within normal limits       Labs/Imaging   Latest Reference Range & Units 08/07/24 08:10 08/07/24 09:04 08/07/24 09:28 10/04/24 08:07   Sodium 136 - 145 mmol/L 139      Potassium 3.5 - 5.2 mmol/L 4.1      Chloride 98 - 107 mmol/L 104      CO2 22.0 - 29.0 mmol/L 21.1 (L)      Anion Gap 5.0 - 15.0 mmol/L 13.9      BUN 6 - 20 mg/dL 12      Creatinine 0.57 - 1.00 mg/dL 0.73      BUN/Creatinine Ratio 7.0 - 25.0  16.4      eGFR >60.0 mL/min/1.73 102.2      Glucose 65 - 99 mg/dL 72      Calcium 8.6 - 10.5 mg/dL 9.3      Alkaline Phosphatase 39 - 117 U/L 83 "      Total Protein 6.0 - 8.5 g/dL 6.9      Albumin 3.5 - 5.2 g/dL 4.2      Globulin gm/dL 2.7      A/G Ratio g/dL 1.6      AST (SGOT) 1 - 32 U/L 23      ALT (SGPT) 1 - 33 U/L 13      Total Bilirubin 0.0 - 1.2 mg/dL <0.2      Cortisol mcg/dL 8.70 14.80 16.30    Cortisol - AM mcg/dL    10.32   Hemoglobin A1C 4.80 - 5.60 % 5.60      TSH Baseline 0.270 - 4.200 uIU/mL 4.200      Free T4 0.92 - 1.68 ng/dL 0.96      (L): Data is abnormally low    Diagnoses and all orders for this visit:    1. Adrenal insufficiency (Primary)  Patient with history of iatrogenic adrenal insufficiency due to steroid exposure.  Patient contacted the clinic earlier this year requesting trial of weaning off steroids.  This was completed and patient subsequently passed an ACTH stimulation.  She was given instructions to taper and ultimately discontinue steroids.  Repeat AM cortisol after steroid discontinuation completed last week was within acceptable range, 10.32.  Patient continues to have intermittent steroid exposure.  We did discuss that this may increase the likelihood of HPA axis suppression and steroid dependence in the future.  Patient voiced understanding.  We discussed signs and symptoms of adrenal insufficiency and when to seek care. We also discussed recommendation to utilize stress dose of steroids in setting of acute illness or any procedure requiring anesthesia.  Patient voiced understanding.  We discussed options regarding follow-up, patient would like to remain established with this office, tentatively plan for follow-up evaluation in 6 months, sooner if needed.      2. Anti-TPO antibodies present  TSH and free T4 were normal in August 2024.  Plan for repeat thyroid function testing annually, sooner if there are concerning symptomatic changes.    Return in about 6 months (around 4/9/2025) for Next scheduled follow up. The patient was instructed to contact the clinic with any interval questions or concerns.    Electronically  signed by: Venita Durán MD   Endocrinologist    Dictated Utilizing Dragon Dictation

## 2025-04-01 NOTE — TELEPHONE ENCOUNTER
04/01/25 1301   Readmission Assessment   Number of Days since last admission? 8-30 days   Previous Disposition SNF   Who is being Interviewed Caregiver   What was the patient's/caregiver's perception as to why they think they needed to return back to the hospital? Other (Comment)  (Rash)   Did you visit your Primary Care Physician after you left the hospital, before you returned this time? Yes  (SNF)   Did you see a specialist, such as Cardiac, Pulmonary, Orthopedic Physician, etc. after you left the hospital? No   Who advised the patient to return to the hospital? Skilled Unit   Does the patient report anything that got in the way of taking their medications? No   In our efforts to provide the best possible care to you and others like you, can you think of anything that we could have done to help you after you left the hospital the first time, so that you might not have needed to return so soon? Other (Comment)  (no)        Discussed with patient.

## 2025-08-06 ENCOUNTER — OFFICE VISIT (OUTPATIENT)
Dept: ENDOCRINOLOGY | Facility: CLINIC | Age: 50
End: 2025-08-06
Payer: COMMERCIAL

## 2025-08-06 VITALS
HEIGHT: 63 IN | HEART RATE: 90 BPM | SYSTOLIC BLOOD PRESSURE: 120 MMHG | WEIGHT: 137.2 LBS | BODY MASS INDEX: 24.31 KG/M2 | DIASTOLIC BLOOD PRESSURE: 80 MMHG | OXYGEN SATURATION: 94 %

## 2025-08-06 DIAGNOSIS — R76.8 ANTI-TPO ANTIBODIES PRESENT: ICD-10-CM

## 2025-08-06 DIAGNOSIS — E27.40 ADRENAL INSUFFICIENCY: Primary | ICD-10-CM

## 2025-08-06 PROCEDURE — 36415 COLL VENOUS BLD VENIPUNCTURE: CPT | Performed by: INTERNAL MEDICINE

## 2025-08-06 PROCEDURE — 84443 ASSAY THYROID STIM HORMONE: CPT | Performed by: INTERNAL MEDICINE

## 2025-08-06 PROCEDURE — 99214 OFFICE O/P EST MOD 30 MIN: CPT | Performed by: INTERNAL MEDICINE

## 2025-08-06 PROCEDURE — 80048 BASIC METABOLIC PNL TOTAL CA: CPT | Performed by: INTERNAL MEDICINE

## 2025-08-06 PROCEDURE — 84439 ASSAY OF FREE THYROXINE: CPT | Performed by: INTERNAL MEDICINE

## 2025-08-07 LAB
ANION GAP SERPL CALCULATED.3IONS-SCNC: 14.4 MMOL/L (ref 5–15)
BUN SERPL-MCNC: 8 MG/DL (ref 6–20)
BUN/CREAT SERPL: 11.3 (ref 7–25)
CALCIUM SPEC-SCNC: 9.4 MG/DL (ref 8.6–10.5)
CHLORIDE SERPL-SCNC: 102 MMOL/L (ref 98–107)
CO2 SERPL-SCNC: 21.6 MMOL/L (ref 22–29)
CREAT SERPL-MCNC: 0.71 MG/DL (ref 0.57–1)
EGFRCR SERPLBLD CKD-EPI 2021: 105 ML/MIN/1.73
GLUCOSE SERPL-MCNC: 86 MG/DL (ref 65–99)
POTASSIUM SERPL-SCNC: 4.1 MMOL/L (ref 3.5–5.2)
SODIUM SERPL-SCNC: 138 MMOL/L (ref 136–145)
T4 FREE SERPL-MCNC: 1.15 NG/DL (ref 0.92–1.68)
TSH SERPL DL<=0.05 MIU/L-ACNC: 1.2 UIU/ML (ref 0.27–4.2)